# Patient Record
Sex: FEMALE | Race: WHITE | Employment: PART TIME | URBAN - METROPOLITAN AREA
[De-identification: names, ages, dates, MRNs, and addresses within clinical notes are randomized per-mention and may not be internally consistent; named-entity substitution may affect disease eponyms.]

---

## 2021-08-09 ENCOUNTER — HOSPITAL ENCOUNTER (INPATIENT)
Age: 21
LOS: 4 days | Discharge: HOME | DRG: 754 | End: 2021-08-13
Attending: PSYCHIATRY & NEUROLOGY | Admitting: INTERNAL MEDICINE
Payer: COMMERCIAL

## 2021-08-09 ENCOUNTER — APPOINTMENT (OUTPATIENT)
Dept: GENERAL RADIOLOGY | Age: 21
DRG: 817 | End: 2021-08-09
Payer: COMMERCIAL

## 2021-08-09 ENCOUNTER — HOSPITAL ENCOUNTER (INPATIENT)
Age: 21
LOS: 1 days | Discharge: PSYCHIATRIC OR SUBSTANCE USE FACILITY | DRG: 817 | End: 2021-08-09
Attending: EMERGENCY MEDICINE | Admitting: INTERNAL MEDICINE
Payer: COMMERCIAL

## 2021-08-09 VITALS
HEIGHT: 66 IN | BODY MASS INDEX: 42.98 KG/M2 | TEMPERATURE: 97.5 F | DIASTOLIC BLOOD PRESSURE: 88 MMHG | OXYGEN SATURATION: 99 % | RESPIRATION RATE: 20 BRPM | HEART RATE: 114 BPM | WEIGHT: 267.42 LBS | SYSTOLIC BLOOD PRESSURE: 123 MMHG

## 2021-08-09 DIAGNOSIS — G25.79 SEROTONIN SYNDROME: ICD-10-CM

## 2021-08-09 DIAGNOSIS — T50.902A INTENTIONAL DRUG OVERDOSE, INITIAL ENCOUNTER (HCC): Primary | ICD-10-CM

## 2021-08-09 PROBLEM — G90.81 SEROTONIN SYNDROME: Status: ACTIVE | Noted: 2021-08-09

## 2021-08-09 LAB
ACETAMINOPHEN LEVEL: <5 UG/ML (ref 10–30)
ALBUMIN SERPL-MCNC: 4 G/DL (ref 3.4–5)
ALP BLD-CCNC: 57 U/L (ref 40–129)
ALT SERPL-CCNC: 22 U/L (ref 10–40)
AMPHETAMINE SCREEN, URINE: NORMAL
ANION GAP SERPL CALCULATED.3IONS-SCNC: 14 MMOL/L (ref 3–16)
AST SERPL-CCNC: 17 U/L (ref 15–37)
BACTERIA: ABNORMAL /HPF
BARBITURATE SCREEN URINE: NORMAL
BASOPHILS ABSOLUTE: 0.1 K/UL (ref 0–0.2)
BASOPHILS RELATIVE PERCENT: 0.6 %
BENZODIAZEPINE SCREEN, URINE: NORMAL
BILIRUB SERPL-MCNC: 0.4 MG/DL (ref 0–1)
BILIRUBIN DIRECT: <0.2 MG/DL (ref 0–0.3)
BILIRUBIN URINE: NEGATIVE
BILIRUBIN, INDIRECT: NORMAL MG/DL (ref 0–1)
BLOOD, URINE: NEGATIVE
BUN BLDV-MCNC: 17 MG/DL (ref 7–20)
CALCIUM SERPL-MCNC: 9.8 MG/DL (ref 8.3–10.6)
CANNABINOID SCREEN URINE: NORMAL
CHLORIDE BLD-SCNC: 101 MMOL/L (ref 99–110)
CLARITY: ABNORMAL
CO2: 23 MMOL/L (ref 21–32)
COCAINE METABOLITE SCREEN URINE: NORMAL
COLOR: YELLOW
CREAT SERPL-MCNC: 0.8 MG/DL (ref 0.6–1.1)
EKG ATRIAL RATE: 115 BPM
EKG DIAGNOSIS: NORMAL
EKG P AXIS: 41 DEGREES
EKG P-R INTERVAL: 126 MS
EKG Q-T INTERVAL: 338 MS
EKG QRS DURATION: 100 MS
EKG QTC CALCULATION (BAZETT): 467 MS
EKG R AXIS: 2 DEGREES
EKG T AXIS: -3 DEGREES
EKG VENTRICULAR RATE: 115 BPM
EOSINOPHILS ABSOLUTE: 0.2 K/UL (ref 0–0.6)
EOSINOPHILS RELATIVE PERCENT: 1.4 %
EPITHELIAL CELLS, UA: 5 /HPF (ref 0–5)
ESTIMATED AVERAGE GLUCOSE: 99.7 MG/DL
ETHANOL: NORMAL MG/DL (ref 0–0.08)
GFR AFRICAN AMERICAN: >60
GFR NON-AFRICAN AMERICAN: >60
GLUCOSE BLD-MCNC: 104 MG/DL (ref 70–99)
GLUCOSE URINE: NEGATIVE MG/DL
HBA1C MFR BLD: 5.1 %
HCG QUALITATIVE: NEGATIVE
HCT VFR BLD CALC: 38.8 % (ref 36–48)
HEMOGLOBIN: 13 G/DL (ref 12–16)
HYALINE CASTS: 2 /LPF (ref 0–8)
KETONES, URINE: NEGATIVE MG/DL
LACTIC ACID: 1.4 MMOL/L (ref 0.4–2)
LEUKOCYTE ESTERASE, URINE: ABNORMAL
LYMPHOCYTES ABSOLUTE: 2 K/UL (ref 1–5.1)
LYMPHOCYTES RELATIVE PERCENT: 17.6 %
Lab: NORMAL
MAGNESIUM: 2 MG/DL (ref 1.8–2.4)
MCH RBC QN AUTO: 29.1 PG (ref 26–34)
MCHC RBC AUTO-ENTMCNC: 33.4 G/DL (ref 31–36)
MCV RBC AUTO: 87.1 FL (ref 80–100)
METHADONE SCREEN, URINE: NORMAL
MICROSCOPIC EXAMINATION: YES
MONOCYTES ABSOLUTE: 0.6 K/UL (ref 0–1.3)
MONOCYTES RELATIVE PERCENT: 5.2 %
NEUTROPHILS ABSOLUTE: 8.6 K/UL (ref 1.7–7.7)
NEUTROPHILS RELATIVE PERCENT: 75.2 %
NITRITE, URINE: NEGATIVE
OPIATE SCREEN URINE: NORMAL
OXYCODONE URINE: NORMAL
PDW BLD-RTO: 14.3 % (ref 12.4–15.4)
PH UA: 6.5
PH UA: 6.5 (ref 5–8)
PHENCYCLIDINE SCREEN URINE: NORMAL
PLATELET # BLD: 410 K/UL (ref 135–450)
PMV BLD AUTO: 8.3 FL (ref 5–10.5)
POTASSIUM REFLEX MAGNESIUM: 3.7 MMOL/L (ref 3.5–5.1)
PROPOXYPHENE SCREEN: NORMAL
PROTEIN UA: NEGATIVE MG/DL
RBC # BLD: 4.46 M/UL (ref 4–5.2)
RBC UA: 2 /HPF (ref 0–4)
SALICYLATE, SERUM: <0.3 MG/DL (ref 15–30)
SARS-COV-2, NAAT: NOT DETECTED
SODIUM BLD-SCNC: 138 MMOL/L (ref 136–145)
SPECIFIC GRAVITY UA: 1.02 (ref 1–1.03)
TOTAL CK: 42 U/L (ref 26–192)
TOTAL PROTEIN: 8.1 G/DL (ref 6.4–8.2)
TSH REFLEX: 2.26 UIU/ML (ref 0.27–4.2)
TSH REFLEX: 2.79 UIU/ML (ref 0.27–4.2)
URINE TYPE: ABNORMAL
UROBILINOGEN, URINE: 0.2 E.U./DL
WBC # BLD: 11.4 K/UL (ref 4–11)
WBC UA: 9 /HPF (ref 0–5)

## 2021-08-09 PROCEDURE — 71045 X-RAY EXAM CHEST 1 VIEW: CPT

## 2021-08-09 PROCEDURE — 83605 ASSAY OF LACTIC ACID: CPT

## 2021-08-09 PROCEDURE — 81001 URINALYSIS AUTO W/SCOPE: CPT

## 2021-08-09 PROCEDURE — 85025 COMPLETE CBC W/AUTO DIFF WBC: CPT

## 2021-08-09 PROCEDURE — 87635 SARS-COV-2 COVID-19 AMP PRB: CPT

## 2021-08-09 PROCEDURE — 2580000003 HC RX 258: Performed by: EMERGENCY MEDICINE

## 2021-08-09 PROCEDURE — 93005 ELECTROCARDIOGRAM TRACING: CPT | Performed by: PHYSICIAN ASSISTANT

## 2021-08-09 PROCEDURE — 80048 BASIC METABOLIC PNL TOTAL CA: CPT

## 2021-08-09 PROCEDURE — 80307 DRUG TEST PRSMV CHEM ANLYZR: CPT

## 2021-08-09 PROCEDURE — 84703 CHORIONIC GONADOTROPIN ASSAY: CPT

## 2021-08-09 PROCEDURE — 2500000003 HC RX 250 WO HCPCS: Performed by: INTERNAL MEDICINE

## 2021-08-09 PROCEDURE — 96361 HYDRATE IV INFUSION ADD-ON: CPT

## 2021-08-09 PROCEDURE — 93010 ELECTROCARDIOGRAM REPORT: CPT | Performed by: INTERNAL MEDICINE

## 2021-08-09 PROCEDURE — 6370000000 HC RX 637 (ALT 250 FOR IP): Performed by: INTERNAL MEDICINE

## 2021-08-09 PROCEDURE — 2000000000 HC ICU R&B

## 2021-08-09 PROCEDURE — 84443 ASSAY THYROID STIM HORMONE: CPT

## 2021-08-09 PROCEDURE — 80076 HEPATIC FUNCTION PANEL: CPT

## 2021-08-09 PROCEDURE — 83036 HEMOGLOBIN GLYCOSYLATED A1C: CPT

## 2021-08-09 PROCEDURE — 1240000000 HC EMOTIONAL WELLNESS R&B

## 2021-08-09 PROCEDURE — 83735 ASSAY OF MAGNESIUM: CPT

## 2021-08-09 PROCEDURE — 99285 EMERGENCY DEPT VISIT HI MDM: CPT

## 2021-08-09 PROCEDURE — 82077 ASSAY SPEC XCP UR&BREATH IA: CPT

## 2021-08-09 PROCEDURE — 80143 DRUG ASSAY ACETAMINOPHEN: CPT

## 2021-08-09 PROCEDURE — 99253 IP/OBS CNSLTJ NEW/EST LOW 45: CPT | Performed by: PSYCHIATRY & NEUROLOGY

## 2021-08-09 PROCEDURE — 82550 ASSAY OF CK (CPK): CPT

## 2021-08-09 PROCEDURE — 36415 COLL VENOUS BLD VENIPUNCTURE: CPT

## 2021-08-09 PROCEDURE — 80179 DRUG ASSAY SALICYLATE: CPT

## 2021-08-09 PROCEDURE — 6360000002 HC RX W HCPCS: Performed by: EMERGENCY MEDICINE

## 2021-08-09 PROCEDURE — 96374 THER/PROPH/DIAG INJ IV PUSH: CPT

## 2021-08-09 RX ORDER — ACETAMINOPHEN 650 MG/1
650 SUPPOSITORY RECTAL EVERY 6 HOURS PRN
Status: DISCONTINUED | OUTPATIENT
Start: 2021-08-09 | End: 2021-08-09 | Stop reason: HOSPADM

## 2021-08-09 RX ORDER — PROMETHAZINE HYDROCHLORIDE 25 MG/1
12.5 TABLET ORAL EVERY 6 HOURS PRN
Status: DISCONTINUED | OUTPATIENT
Start: 2021-08-09 | End: 2021-08-09 | Stop reason: HOSPADM

## 2021-08-09 RX ORDER — LORAZEPAM 2 MG/ML
1 INJECTION INTRAMUSCULAR ONCE
Status: COMPLETED | OUTPATIENT
Start: 2021-08-09 | End: 2021-08-09

## 2021-08-09 RX ORDER — LORAZEPAM 2 MG/ML
1 INJECTION INTRAMUSCULAR
Status: DISCONTINUED | OUTPATIENT
Start: 2021-08-09 | End: 2021-08-09 | Stop reason: HOSPADM

## 2021-08-09 RX ORDER — CYCLOBENZAPRINE HCL 5 MG
TABLET ORAL
Status: ON HOLD | COMMUNITY
Start: 2021-07-26 | End: 2021-08-09

## 2021-08-09 RX ORDER — SODIUM CHLORIDE 9 MG/ML
25 INJECTION, SOLUTION INTRAVENOUS PRN
Status: DISCONTINUED | OUTPATIENT
Start: 2021-08-09 | End: 2021-08-09 | Stop reason: HOSPADM

## 2021-08-09 RX ORDER — DIAZEPAM 5 MG/1
10 TABLET ORAL ONCE
Status: COMPLETED | OUTPATIENT
Start: 2021-08-09 | End: 2021-08-09

## 2021-08-09 RX ORDER — 0.9 % SODIUM CHLORIDE 0.9 %
1000 INTRAVENOUS SOLUTION INTRAVENOUS ONCE
Status: COMPLETED | OUTPATIENT
Start: 2021-08-09 | End: 2021-08-09

## 2021-08-09 RX ORDER — POTASSIUM CHLORIDE 7.45 MG/ML
10 INJECTION INTRAVENOUS PRN
Status: DISCONTINUED | OUTPATIENT
Start: 2021-08-09 | End: 2021-08-09 | Stop reason: HOSPADM

## 2021-08-09 RX ORDER — LABETALOL HYDROCHLORIDE 5 MG/ML
10 INJECTION, SOLUTION INTRAVENOUS EVERY 4 HOURS PRN
Status: DISCONTINUED | OUTPATIENT
Start: 2021-08-09 | End: 2021-08-09 | Stop reason: HOSPADM

## 2021-08-09 RX ORDER — ACETAMINOPHEN 325 MG/1
650 TABLET ORAL EVERY 6 HOURS PRN
Status: DISCONTINUED | OUTPATIENT
Start: 2021-08-09 | End: 2021-08-09 | Stop reason: HOSPADM

## 2021-08-09 RX ORDER — SODIUM CHLORIDE 0.9 % (FLUSH) 0.9 %
10 SYRINGE (ML) INJECTION EVERY 12 HOURS SCHEDULED
Status: DISCONTINUED | OUTPATIENT
Start: 2021-08-09 | End: 2021-08-09 | Stop reason: HOSPADM

## 2021-08-09 RX ORDER — ONDANSETRON 2 MG/ML
4 INJECTION INTRAMUSCULAR; INTRAVENOUS EVERY 6 HOURS PRN
Status: DISCONTINUED | OUTPATIENT
Start: 2021-08-09 | End: 2021-08-09 | Stop reason: HOSPADM

## 2021-08-09 RX ORDER — SODIUM CHLORIDE 0.9 % (FLUSH) 0.9 %
10 SYRINGE (ML) INJECTION PRN
Status: DISCONTINUED | OUTPATIENT
Start: 2021-08-09 | End: 2021-08-09 | Stop reason: HOSPADM

## 2021-08-09 RX ORDER — EPINEPHRINE 0.1 MG/.1ML
INJECTION, SOLUTION INTRAMUSCULAR
Status: ON HOLD | COMMUNITY
End: 2021-08-09

## 2021-08-09 RX ORDER — MAGNESIUM SULFATE IN WATER 40 MG/ML
2000 INJECTION, SOLUTION INTRAVENOUS PRN
Status: DISCONTINUED | OUTPATIENT
Start: 2021-08-09 | End: 2021-08-09 | Stop reason: HOSPADM

## 2021-08-09 RX ORDER — NAPROXEN 500 MG/1
TABLET ORAL
Status: ON HOLD | COMMUNITY
Start: 2021-07-26 | End: 2021-08-09

## 2021-08-09 RX ORDER — IBUPROFEN 200 MG
400 TABLET ORAL EVERY 6 HOURS PRN
Status: ON HOLD | COMMUNITY
End: 2021-08-09

## 2021-08-09 RX ORDER — CONTAINER,EMPTY
1 EACH MISCELLANEOUS
Status: DISCONTINUED | OUTPATIENT
Start: 2021-08-09 | End: 2021-08-09 | Stop reason: HOSPADM

## 2021-08-09 RX ORDER — CYCLOBENZAPRINE HCL 5 MG
TABLET ORAL
Status: ON HOLD | COMMUNITY
Start: 2021-07-26 | End: 2021-08-09 | Stop reason: HOSPADM

## 2021-08-09 RX ORDER — LABETALOL HYDROCHLORIDE 5 MG/ML
10 INJECTION, SOLUTION INTRAVENOUS ONCE
Status: COMPLETED | OUTPATIENT
Start: 2021-08-09 | End: 2021-08-09

## 2021-08-09 RX ADMIN — LABETALOL HYDROCHLORIDE 10 MG: 5 INJECTION INTRAVENOUS at 03:40

## 2021-08-09 RX ADMIN — LORAZEPAM 1 MG: 2 INJECTION INTRAMUSCULAR; INTRAVENOUS at 01:27

## 2021-08-09 RX ADMIN — SODIUM CHLORIDE 1000 ML: 9 INJECTION, SOLUTION INTRAVENOUS at 01:29

## 2021-08-09 RX ADMIN — DIAZEPAM 10 MG: 5 TABLET ORAL at 03:32

## 2021-08-09 ASSESSMENT — ENCOUNTER SYMPTOMS
SHORTNESS OF BREATH: 0
VOMITING: 0
NAUSEA: 0
ABDOMINAL PAIN: 0

## 2021-08-09 ASSESSMENT — PAIN DESCRIPTION - LOCATION
LOCATION: ABDOMEN
LOCATION: HEAD

## 2021-08-09 ASSESSMENT — PATIENT HEALTH QUESTIONNAIRE - PHQ9: SUM OF ALL RESPONSES TO PHQ QUESTIONS 1-9: 14

## 2021-08-09 ASSESSMENT — PAIN SCALES - GENERAL
PAINLEVEL_OUTOF10: 0
PAINLEVEL_OUTOF10: 4
PAINLEVEL_OUTOF10: 0

## 2021-08-09 ASSESSMENT — LIFESTYLE VARIABLES: HISTORY_ALCOHOL_USE: NO

## 2021-08-09 ASSESSMENT — SLEEP AND FATIGUE QUESTIONNAIRES
AVERAGE NUMBER OF SLEEP HOURS: 14
DO YOU USE A SLEEP AID: NO
DO YOU HAVE DIFFICULTY SLEEPING: NO

## 2021-08-09 ASSESSMENT — PAIN DESCRIPTION - PAIN TYPE: TYPE: ACUTE PAIN

## 2021-08-09 ASSESSMENT — PAIN DESCRIPTION - DESCRIPTORS: DESCRIPTORS: ACHING

## 2021-08-09 NOTE — DISCHARGE SUMMARY
complete review of systems was asked and negative     Discharge Exam:    /79   Pulse 80   Temp 96.7 °F (35.9 °C) (Temporal)   Resp 18   Ht 5' 6\" (1.676 m)   Wt 267 lb 6.7 oz (121.3 kg)   SpO2 94%   BMI 43.16 kg/m²   General appearance:  NAD  HEENT:   Normal cephalic, atraumatic, moist mucous membranes, no oropharyngeal erythema or exudate  Heart[de-identified] Normal s1/s2, RRR, no murmurs, gallops, or rubs. no leg edema  Lungs:  Normal respiratory effort. Clear to auscultation, bilaterally without Rales/Wheezes/Rhonchi. Abdomen: Soft, non-tender, non-distended, bowel sounds present, no masses  Musculoskeletal:  No clubbing, no cyanosis, *  Neurologic:  Neurovascularly intact without any focal sensory/motor deficits. Cranial nerves: II-XII intact, grossly non-focal.    Labs: For convenience and continuity at follow-up the following most recent labs are provided:    Lab Results   Component Value Date    WBC 11.4 08/09/2021    HGB 13.0 08/09/2021    HCT 38.8 08/09/2021    MCV 87.1 08/09/2021     08/09/2021     08/09/2021    K 3.7 08/09/2021     08/09/2021    CO2 23 08/09/2021    BUN 17 08/09/2021    CREATININE 0.8 08/09/2021    CALCIUM 9.8 08/09/2021    ALKPHOS 57 08/09/2021    ALT 22 08/09/2021    AST 17 08/09/2021    BILITOT 0.4 08/09/2021    BILIDIR <0.2 08/09/2021    LABALBU 4.0 08/09/2021     No results found for: INR        The patient was seen and examined on day of discharge and this discharge summary is in conjunction with any daily progress note from day of discharge. Time Spent on discharge is 45 minutes  in the examination, evaluation, counseling and review of medications and discharge plan. Note that greater  than 30 minutes was spent in preparing discharge papers, discussing discharge with patient, medication review, etc.       Signed:    Sariah Whipple MD   8/9/2021      Thank you Laurita Espinosa for the opportunity to be involved in this patient's care.  If you have any questions or concerns please feel free to contact me

## 2021-08-09 NOTE — PLAN OF CARE
Problem: Suicide risk  Goal: Provide patient with safe environment  Description: Provide patient with safe environment  8/9/2021 1221 by Rand Poe RN  Outcome: Ongoing  8/9/2021 0541 by Mary Jones RN  Outcome: Met This Shift     Problem: SAFETY  Goal: Free from accidental physical injury  Outcome: Ongoing  Goal: Free from intentional harm  Outcome: Ongoing     Problem: DAILY CARE  Goal: Daily care needs are met  Outcome: Ongoing     Problem: PAIN  Goal: Patient's pain/discomfort is manageable  Outcome: Ongoing     Problem: SKIN INTEGRITY  Goal: Skin integrity is maintained or improved  Outcome: Ongoing     Problem: KNOWLEDGE DEFICIT  Goal: Patient/S.O. demonstrates understanding of disease process, treatment plan, medications, and discharge instructions.   Outcome: Ongoing     Problem: DISCHARGE BARRIERS  Goal: Patient's continuum of care needs are met  8/9/2021 1221 by Rand Poe RN  Outcome: Ongoing  8/9/2021 0541 by Mary Jones RN  Outcome: Ongoing     Problem: Pain:  Goal: Pain level will decrease  Description: Pain level will decrease  Outcome: Ongoing  Goal: Control of acute pain  Description: Control of acute pain  Outcome: Ongoing  Goal: Control of chronic pain  Description: Control of chronic pain  Outcome: Ongoing

## 2021-08-09 NOTE — PROGRESS NOTES
Yocasta De La Fuente called to verify that the Pt has a statement of Belief 72/hr hold filled out and on the pts hard chart. Hard chart checked and verified statement is present.

## 2021-08-09 NOTE — ED PROVIDER NOTES
[facility]        Pt Name: Crystal Gordon  MRN: [id number]  Estephaniagfmaritza 2000  Date of evaluation: 8/9/2021  Provider: Aggie Mccray PA-C  PCP: Thao Wyatt  Note Started: 12:52 AM EDT        I have seen and evaluated this patient with my supervising physician Jd Cee MD.    [address] [place] [address]       Chief Complaint   Patient presents with    Drug Overdose    Suicidal       HISTORY OF PRESENT ILLNESS   (Location, Timing/Onset, Context/Setting, Quality, Duration, Modifying Factors, Severity, Associated Signs and Symptoms)  Note limiting factors. Chief Complaint: Dietra Kawasaki is a 23 y.o. female who presents to the emergency department for evaluation of suicide attempt. Patient knowingly ingested fifteen 50 mg sertraline tablets in an attempt to end her life. Patient states she just felt sad today and nothing in particular set her off although she was crying in the bathroom at work, her coworkers found her and she ran outside and ingested the pills out by the [employer]. Patient has a previous suicidal attempt with overdose of ibuprofen. Denies any other over-the-counter drug use. Denies any other prescription drug use. Denies any alcohol use. Nursing Notes were all reviewed and agreed with or any disagreements were addressed in the HPI. REVIEW OF SYSTEMS    (2-9 systems for level 4, 10 or more for level 5)     Review of Systems   Constitutional: Negative for fatigue and fever. HENT: Negative. Eyes: Negative for visual disturbance. Respiratory: Negative for shortness of breath. Cardiovascular: Negative for chest pain. Gastrointestinal: Negative for abdominal pain, nausea and vomiting. Genitourinary: Negative. Musculoskeletal: Negative. Skin: Negative. Neurological: Negative. Psychiatric/Behavioral: Positive for suicidal ideas.        Positives and Pertinent negatives as per HPI. Except as noted above in the ROS, all other systems were reviewed and negative. PAST MEDICAL HISTORY     Past Medical History:   Diagnosis Date    Depression          SURGICAL HISTORY   History reviewed. No pertinent surgical history. CURRENTMEDICATIONS       Previous Medications    CYCLOBENZAPRINE (FLEXERIL) 5 MG TABLET    Take by mouth    CYCLOBENZAPRINE (FLEXERIL) 5 MG TABLET    TAKE 1 TABLET BY MOUTH THREE TIMES A DAY    EPINEPHRINE (AUVI-Q) 0.1 MG/0.1ML SOAJ        IBUPROFEN (ADVIL;MOTRIN) 200 MG TABLET    Take 200 mg by mouth every 6 hours as needed. IBUPROFEN (ADVIL;MOTRIN) 200 MG TABLET    Take 400 mg by mouth every 6 hours as needed    IBUPROFEN (IBU) 600 MG TABLET    Take 1 tablet by mouth every 8 hours as needed for Pain. NAPROXEN (NAPROSYN) 500 MG TABLET    TAKE 1 TABLET BY MOUTH TWICE A DAY WITH FOOD    SERTRALINE (ZOLOFT) 50 MG TABLET    TAKE 1 TABLET BY MOUTH EVERY DAY    SERTRALINE (ZOLOFT) 50 MG TABLET    Take by mouth         ALLERGIES     Nutritional supplements    FAMILYHISTORY     History reviewed. No pertinent family history. SOCIAL HISTORY       Social History     Tobacco Use    Smoking status: Never Smoker    Smokeless tobacco: Never Used   Substance Use Topics    Alcohol use: No    Drug use: No       SCREENINGS             PHYSICAL EXAM    (up to 7 for level 4, 8 or more for level 5)     ED Triage Vitals   BP Temp Temp src Pulse Resp SpO2 Height Weight   -- -- -- -- -- -- -- --       Physical Exam  Vitals and nursing note reviewed. Constitutional:       General: She is not in acute distress. Appearance: Normal appearance. She is well-developed. She is not toxic-appearing or diaphoretic. HENT:      Head: Normocephalic and atraumatic. Nose: Nose normal.   Eyes:      General:         Right eye: No discharge. Left eye: No discharge. Cardiovascular:      Rate and Rhythm: Normal rate and regular rhythm. Heart sounds: Normal heart sounds. No murmur heard. No gallop. Pulmonary:      Effort: Pulmonary effort is normal. No respiratory distress. Breath sounds: Normal breath sounds. No wheezing or rales. Musculoskeletal:         General: Normal range of motion. Cervical back: Normal range of motion and neck supple. Skin:     General: Skin is warm and dry. Coloration: Skin is not pale. Neurological:      Mental Status: She is alert and oriented to person, place, and time. Psychiatric:         Attention and Perception: Attention normal.         Mood and Affect: Affect is flat. Speech: Speech is not slurred. Behavior: Behavior is slowed. Thought Content: Thought content includes suicidal ideation. Thought content does not include homicidal ideation. Thought content does not include homicidal plan.          DIAGNOSTIC RESULTS   LABS:    Labs Reviewed   CBC WITH AUTO DIFFERENTIAL - Abnormal; Notable for the following components:       Result Value    WBC 11.4 (*)     Neutrophils Absolute 8.6 (*)     All other components within normal limits    Narrative:     Performed at:  [facility]  [address], [place]   Phone [phone]   BASIC METABOLIC PANEL W/ REFLEX TO MG FOR LOW K - Abnormal; Notable for the following components:    Glucose 104 (*)     All other components within normal limits    Narrative:     Performed at:  [facility]  [address], [address]   Phone [phone]   SALICYLATE LEVEL - Abnormal; Notable for the following components:    Salicylate, Serum <9.8 (*)     All other components within normal limits    Narrative:     Performed at:  [facility]  [address], [address]   Phone [phone]   ACETAMINOPHEN LEVEL - Abnormal; Notable for the following components:    Acetaminophen Level <5 (*)     All other components within normal limits Narrative:     Performed at:  [facility] [address],  [place], [address]   Phone [phone], RAP[place]    Narrative:     Performed at:  [facility] [address],  [place], [address]   Phone [phone]   HCG, SERUM, QUALITATIVE    Narrative:     Performed at:  [facility] [address],  [place], [address]   Phone [phone]   ETHANOL    Narrative:     Performed at:  [facility] [address],  [place], [address]   Phone [phone]   CK    Narrative:     Performed at:  [facility] [address],  [place], [address]   Phone [phone]   LACTIC ACID, PLASMA    Narrative:     Performed at:  [facility] [address],  [place], [address]   Phone [phone]   HEPATIC FUNCTION PANEL    Narrative:     Performed at:  [facility] [address]  [place], [address]   Phone [phone]   MAGNESIUM    Narrative:     Performed at:  [facility] [address],  [place], [address]   Phone [phone]   URINE DRUG SCREEN   URINALYSIS   TSH WITH REFLEX       When ordered only abnormal lab results are displayed. All other labs were within normal range or not returned as of this dictation. EKG: When ordered, EKG's are interpreted by the Emergency Department Physician in the absence of a cardiologist.  Please see their note for interpretation of EKG.     RADIOLOGY:   Non-plain film images such as CT, Ultrasound and MRI are read by the radiologist. Plain radiographic images are visualized and preliminarily interpreted by the ED Provider with the below findings:        Interpretation per the Radiologist below, if available at the time of this note:    XR CHEST PORTABLE   Final Result   No significant findings in the chest.           No results found. PROCEDURES   Unless otherwise noted below, none     Procedures    CRITICAL CARE TIME   N/A    CONSULTS:  IP CONSULT TO HOSPITALIST  IP CONSULT TO PSYCHIATRY      EMERGENCY DEPARTMENT COURSE and DIFFERENTIAL DIAGNOSIS/MDM:   Vitals:    Vitals:    08/09/21 0300 08/09/21 0330 08/09/21 0333 08/09/21 0345   BP: (!) 133/90 (!) 135/91  134/69   Pulse: 115 113  96   Resp: 23 28  28   Temp:   99 °F (37.2 °C)    TempSrc:   Oral    SpO2: 94% 97%  95%   Weight:       Height:           Patient was given the following medications:  Medications   LORazepam (ATIVAN) injection 1 mg (has no administration in time range)   LORazepam (ATIVAN) injection 1 mg (1 mg Intravenous Given 8/9/21 0127)   0.9 % sodium chloride bolus (0 mLs Intravenous Stopped 8/9/21 0251)   diazePAM (VALIUM) tablet 10 mg (10 mg Oral Given 8/9/21 0332)   labetalol (NORMODYNE;TRANDATE) injection 10 mg (10 mg Intravenous Given 8/9/21 0340)           Patient presents emergency department for evaluation of intentional overdose. Patient intentionally took 15 tablets of 50 mg Zoloft in an attempt to end her life. Patient states she just \"felt very sad today\". When asked why she felt sad today she states \"nothing really\". Patient states she was crying at work when her coworkers found her and so she went outside by [employer] and took all of the pills. Patient is slow with her speech and slow to respond. Patient holds eye contact and inappropriate length of time. Her affect is rather flat and odd. Patient has bilateral hand/foot tremors. Patient does start vomiting here in the emergency department. Patient has a history of intentional overdose with ibuprofen but states she did not take any of this today. Patient denies any additional history of self-harm such as cutting. No fresh wounds noted on examination. Heart rate is tachycardic without murmurs rubs or gallops. Lungs clear to auscultation bilaterally.  Patient's heart rate vacillates between 100 bpm and 130 bpm. Pupils are appropriate for light in the room. Equal and reactive to light. She is able to ambulate with normal gait. Normal strength and coordination to all 4 extremities. She is cooperative on examination. She is started on fluids as well as Ativan. EKG shows sinus tachycardia without acute ST changes. I spoke with Guzman Gresham from poison control who states that SSRI overdose can cause CNS depression and both sedation or agitation. Benzo should be given for agitation. She states at this point charcoal can be administered however not necessary, Zoloft therapeutic peak is 48 hours after ingestion. She recommends a observation of 6 to 12 hours. Patient remains very tremulous with odd affect. Concern for serotonin syndrome. Patient will be admitted to the hospitalist for further management. Dr. Tanesha Delacruz accepts the patient for admission prior to psychiatric transfer. FINAL IMPRESSION      1. Intentional drug overdose, initial encounter ([facility] [address]    2. Serotonin syndrome          DISPOSITION/PLAN   DISPOSITION Admitted 08/09/2021 03:23:54 AM      PATIENT REFERRED TO:  No follow-up provider specified.     DISCHARGE MEDICATIONS:  New Prescriptions    No medications on file       DISCONTINUED MEDICATIONS:  Discontinued Medications    No medications on file              (Please note that portions of this note were completed with a voice recognition program.  Efforts were made to edit the dictations but occasionally words are mis-transcribed.)    Aguilar North PA-C (electronically signed)           Aguilar North PA-C  08/09/21 0000

## 2021-08-09 NOTE — PROGRESS NOTES
Pt arrived from the ED with a . She is on room air breathing equal and easy. VSS. At this time the pt denies any Pain, Shortness of breath, N&V. No belongings came up with the Pt. Per ED notes all belongings were sent to security. Pt states that she is not yet able to void for a urine collect but will attempt again. She further states that she works at [employer], she lives at home with her great grandma and their cat. Pt would like her grandma notified of the admission later this morning but declined to have her called at this early Hour. Pt said that her mother lives in [place] but she does not know of her working number \" She has her phone stolen a lot so I don't know. \" Pt is vocal, calm and cooperative, she states that \"I wish I had a , someone to help me make appointments and get to them. \" Pt said that she does not drive, \"Im too scared to drive. \" She further says that she has a history of \"Hallucinations\" none presently, but the last time was \"last month I was seeing centipedes. \" Lastly she stated that she has not been diagnosed with a mental health disorder but she is receptive to help. Pt room made safe, Lockable cabinets locked, extra wires removed, Pt has a safety sitter. Bed locked, lowed, rails x 2, alarm on. Pt updated on plan and agreeable. No needs made known.    Allison Morris RN

## 2021-08-09 NOTE — ED PROVIDER NOTES
I independently performed a history and physical on [facility]. All diagnostic, treatment, and disposition decisions were made by myself in conjunction with the advanced practice provider. Briefly, this is a 23 y.o. female here for intentional overdose. Wish to commit suicide. Has been feeling more stressed recently. No clear inciting factor. 1 hour prior to arrival, took about 15 tablets of Zoloft 50 mg all at once. Denies any other ingestions. Denies alcohol intake or drugs. Has mild generalized abdominal discomfort. Has nausea. No chest pain or shortness of breath. No recent fever or illness. .    On exam,   General: Patient is in no acute distress  Skin: No cyanosis  HEENT: Moist mucous membranes  Heart: Regular rate, regular rhythm  Lung: No respiratory distress  Abdomen: Soft, nontender  Neuro: Moving all extremities, no facial droop, no slurred speech, answers questions appropriately. Inducible clonus in ankles. Resting tremor  Flat affect, suicidal        EKG  The Ekg interpreted by me in the absence of a cardiologist shows. Normal sinus rhythm  No acute ST changes     Qtc 467  No priors for comparison      Screenings            MDM  Briefly, this is a 23 y.o. female here for intentional overdose with Zoloft. Found to be tachycardic to 130 with tremor and clonus. May have serotonin syndrome however time course is inconsistent since peak effect usually is 4-6 hours after Zoloft ingestion. Will consult poison control. Will obtain EKG. Will avoid QT prolonging medications. Will give Ativan, IV fluids for tachycardia. Does not appear agitated. She is on a 72-hour hold for intentional overdose for suicidal ideation. Will admit to hospitalist service. Since patient came in over an hour after ingestion and she is actively vomiting, we will not give activated charcoal    Poison control consulted    On reevaluation does not have abd pain.      The total critical care

## 2021-08-09 NOTE — PROGRESS NOTES
Pt requested that her great grandmother whom she lives with be contacted. Desmond Santa [phone]. Attempted to contact without answer. Will attempt again.
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
## 2021-08-09 NOTE — DISCHARGE INSTR - COC
Continuity of Care Form    Patient Name: Stiven Perea   :  2000  MRN:  [id number]    Admit date:  2021  Discharge date:  ***    Code Status Order: Full Code   Advance Directives:      Admitting Physician:  Lilian Patten DO  PCP: Zena Collazo    Discharging Nurse: [facility] Unit/Room#: [id number]/8819-96  Discharging Unit Phone Number: ***    Emergency Contact:   Extended Emergency Contact Information  Primary Emergency Contact: Tiffany Curiel  Address: [address] Phone: [phone]  Relation: Parent    Past Surgical History:  History reviewed. No pertinent surgical history. Immunization History: There is no immunization history on file for this patient.     Active Problems:  Patient Active Problem List   Diagnosis Code    Serotonin syndrome G25.79    Intentional drug overdose ([facility] [address] T50.902A    Depression F32.9    Anxiety disorder F41.9       Isolation/Infection:   Isolation            No Isolation          Patient Infection Status       None to display            Nurse Assessment:  Last Vital Signs: /79   Pulse 80   Temp 96.7 °F (35.9 °C) (Temporal)   Resp 18   Ht 5' 6\" (1.676 m)   Wt 267 lb 6.7 oz (121.3 kg)   SpO2 94%   BMI 43.16 kg/m²     Last documented pain score (0-10 scale): Pain Level: 0  Last Weight:   Wt Readings from Last 1 Encounters:   21 267 lb 6.7 oz (121.3 kg)     Mental Status:  {IP PT MENTAL STATUS::::0}    IV Access:  { YUDITH IV ACCESS:908462225:::0}    Nursing Mobility/ADLs:  Walking   {CHP DME ADLs:937572246:::0}  Transfer  {CHP DME ADLs:426894049:::0}  Bathing  {CHP DME ADLs:122057759:::0}  Dressing  {CHP DME ADLs:224261717:::0}  Toileting  {CHP DME ADLs:728195326:::0}  Feeding  {CHP DME ADLs:667711344:::0}  Med Admin  {CHP DME ADLs:434300065:::0}  Med Delivery   { YUDITH MED Delivery:363454725:::0}    Wound Care Documentation and Therapy: Elimination:  Continence: Bowel: {YES / GT:91113}  Bladder: {YES / DS:07015}  Urinary Catheter: {Urinary Catheter:638570517:::0}   Colostomy/Ileostomy/Ileal Conduit: {YES / MW:67980}       Date of Last BM: ***    Intake/Output Summary (Last 24 hours) at 2021 1550  Last data filed at 2021 1200  Gross per 24 hour   Intake 480 ml   Output 700 ml   Net -220 ml     I/O last 3 completed shifts:   In: 18 [P.O.:480]  Out: 700 [Urine:700]    Safety Concerns:     { YUDITH Safety Concerns:300089420:::0}    Impairments/Disabilities:      [address] Impairments/Disabilities:534224959:::0}    Nutrition Therapy:  Current Nutrition Therapy:   [address] YUDITH Diet List:004078391:::0}    Routes of Feeding: {[facility] DME Other Feedings:208505755:::0}  Liquids: {Slp liquid thickness:51963}  Daily Fluid Restriction: {[facility] DME Yes amt example:288975898:::0}  Last Modified Barium Swallow with Video (Video Swallowing Test): {Done Not Done OPBU:132840300:::0}    Treatments at the Time of Hospital Discharge:   Respiratory Treatments: ***  Oxygen Therapy:  {Therapy; copd oxygen:65837:::0}  Ventilator:    { CC Vent List:486583628:::0}    Rehab Therapies: {THERAPEUTIC INTERVENTION:9822063912}  Weight Bearing Status/Restrictions: { CC Weight Bearin:::0}  Other Medical Equipment (for information only, NOT a DME order):  {EQUIPMENT:023466696}  Other Treatments: ***    Patient's personal belongings (please select all that are sent with patient):  {[facility] DME Belongings:550905276:::0}    RN SIGNATURE:  {Esignature:455215273:::0}    CASE MANAGEMENT/SOCIAL WORK SECTION    Inpatient Status Date: ***    Readmission Risk Assessment Score:  Readmission Risk              Risk of Unplanned Readmission:  7           Discharging to Facility/ Agency   Name:   Address:  Phone:  Fax:    Dialysis Facility (if applicable)   Name:  Address:  Dialysis Schedule:  Phone:  Fax:    / signature: {Esignature:811900730:::0}    PHYSICIAN SECTION    Prognosis: Good    Condition at Discharge: Stable    Rehab Potential (if transferring to Rehab): Good    Recommended Labs or Other Treatments After Discharge: none     Physician Certification: I certify the above information and transfer of Thea Cifuentes  is necessary for the continuing treatment of the diagnosis listed and that she requires Intermediate Nursing Care for greater 30 days.      Update Admission H&P: No change in H&P    PHYSICIAN SIGNATURE:  Electronically signed by Hetcor Dueñas MD on 8/9/21 at 3:51 PM EDT

## 2021-08-09 NOTE — PROGRESS NOTES
Norma Estrella from Poison control called for an update. Vital signs, and results for ETOH, acetaminophen and salicylate given. No further questions. Poison control available if needed and will continue to follow.

## 2021-08-09 NOTE — PROGRESS NOTES
Assessment complete. VSS. Medications given per MAR. Alert and oriented. NSR/ST per monitor. Lung sounds clear. PERRLA. Neuro assessment complete. See flowsheet for details. Sitter at bedside. Patient calm and cooperative. Call light and bedside table within reach.

## 2021-08-09 NOTE — H&P
Counseling Given     Comments             Family History:      Reviewed in detail, and noncontributory    History reviewed. No pertinent family history. REVIEW OF SYSTEMS:     Constitutional:  No Fever, No Chills, No Night Sweats  ENT/Mouth:  No Nasal Congestion,  No Hoarseness, No new mouth lesion  Eyes:  No Eye Pain, No Redness, No Discharge  Cardiovascular:  No Chest Pain, No Orthopnea, No Palpitations  Respiratory:  No Cough, No Sputum, No Dyspnea  Gastrointestinal: No Vomiting, No Diarrhea, No abdominal pain  Genitourinary: No Urinary Frequency, No Hematuria, No Urinary pain  Musculoskeletal:  No worsening Arthralgias, No worsening Myalgias  Skin:  No new Skin Lesions, No new skin rash  Neuro:  No new weakness, No new numbness. Psych:  + suicial ideation, No Violence ideation    PHYSICAL EXAM PERFORMED:    /78   Pulse 100   Temp 96.7 °F (35.9 °C) (Temporal)   Resp 28   Ht 5' 6\" (1.676 m)   Wt 267 lb 6.7 oz (121.3 kg)   SpO2 98%   BMI 43.16 kg/m²     General appearance:  mild acute distress, appears older than stated age  HEENT:   atraumatic, sclera anicteric, Conjunctivae clear. Neck: Supple,Trachea midline, no goiter  Respiratory:minimal accessory muscle usage, Normal respiratory effort. Clear to auscultation, bilaterally without wheezing  Cardiovascular: Tachycardic, capillary refill 2 seconds  Abdomen: Morbid obese, soft, non-tender, non-distended with normal bowel sounds. Musculoskeletal:  No clubbing, cyanosis. trace edema LE bilaterally. Skin: turgor normal.  No new rashes or lesions. Neurologic: Alert and oriented x4, no new focal sensory/motor deficits.   DTR 2/4, clonus present and spontaneous, tremulous, anxious    Labs:     Recent Labs     08/09/21  0125   WBC 11.4*   HGB 13.0   HCT 38.8        Recent Labs     08/09/21  0125      K 3.7      CO2 23   BUN 17   CREATININE 0.8   CALCIUM 9.8     Recent Labs     08/09/21  0258   AST 17   ALT 22   BILIDIR <0.2 BILITOT 0.4   ALKPHOS 57     No results for input(s): INR in the last 72 hours. Recent Labs     08/09/21  0258   CKTOTAL 42       Urinalysis:    No results found for: Denys Sánchez, BACTERIA, RBCUA, BLOODU, [address], GLUCOSEU    Radiology:     CXR: I have reviewed the CXR with the following interpretation:   No acute process  EKG:  I have reviewed the EKG with the following interpretation:   Sinus tachycardia     XR CHEST PORTABLE   Final Result   No significant findings in the chest.             ASSESSMENT AND PLAN:    Active Hospital Problems    Diagnosis Date Noted    Serotonin syndrome [G25.79] 08/09/2021     Serotonin syndrome:  Evident with clonus, tremors  Did not appreciate hyperreflexia or ocular involvement  IV labetalol given due to hypertension  Librium, with IV Ativan    Suicidal ideation with attempt  dedra Rudd slipped  Psychiatry consultation, much appreciated    Class III obesity:Complicating assessment and treatment. Placing patient at risk for multiple co-morbidities as well as early death and contributing to the patient's presentation.  Education, and counseling    Depression:  Held Zoloft in the setting of SS    Diet: Regular diet    DVT Prophylaxis: Held    Dispo:   Expected LOS greater than two Shanti 1153, DO

## 2021-08-10 PROCEDURE — 6370000000 HC RX 637 (ALT 250 FOR IP): Performed by: PSYCHIATRY & NEUROLOGY

## 2021-08-10 PROCEDURE — 99223 1ST HOSP IP/OBS HIGH 75: CPT | Performed by: PSYCHIATRY & NEUROLOGY

## 2021-08-10 PROCEDURE — 1240000000 HC EMOTIONAL WELLNESS R&B

## 2021-08-10 RX ORDER — ARIPIPRAZOLE 10 MG/1
5 TABLET ORAL DAILY
Status: DISCONTINUED | OUTPATIENT
Start: 2021-08-10 | End: 2021-08-11

## 2021-08-10 RX ORDER — ACETAMINOPHEN 325 MG/1
650 TABLET ORAL EVERY 4 HOURS PRN
Status: DISCONTINUED | OUTPATIENT
Start: 2021-08-10 | End: 2021-08-13 | Stop reason: HOSPADM

## 2021-08-10 RX ORDER — IBUPROFEN 400 MG/1
400 TABLET ORAL EVERY 6 HOURS PRN
Status: DISCONTINUED | OUTPATIENT
Start: 2021-08-10 | End: 2021-08-13 | Stop reason: HOSPADM

## 2021-08-10 RX ORDER — CYCLOBENZAPRINE HCL 5 MG
5 TABLET ORAL 3 TIMES DAILY
Status: ON HOLD | COMMUNITY
End: 2021-08-13 | Stop reason: HOSPADM

## 2021-08-10 RX ORDER — NAPROXEN 500 MG/1
500 TABLET ORAL 2 TIMES DAILY WITH MEALS
Status: ON HOLD | COMMUNITY
End: 2021-08-23 | Stop reason: HOSPADM

## 2021-08-10 RX ORDER — LAMOTRIGINE 25 MG/1
50 TABLET ORAL DAILY
Status: DISCONTINUED | OUTPATIENT
Start: 2021-08-10 | End: 2021-08-13 | Stop reason: HOSPADM

## 2021-08-10 RX ADMIN — LAMOTRIGINE 50 MG: 25 TABLET ORAL at 14:33

## 2021-08-10 RX ADMIN — SERTRALINE 50 MG: 50 TABLET, FILM COATED ORAL at 10:57

## 2021-08-10 RX ADMIN — ARIPIPRAZOLE 5 MG: 10 TABLET ORAL at 14:33

## 2021-08-10 ASSESSMENT — LIFESTYLE VARIABLES: HISTORY_ALCOHOL_USE: NO

## 2021-08-10 ASSESSMENT — PATIENT HEALTH QUESTIONNAIRE - PHQ9: SUM OF ALL RESPONSES TO PHQ QUESTIONS 1-9: 11

## 2021-08-10 ASSESSMENT — SLEEP AND FATIGUE QUESTIONNAIRES
DO YOU USE A SLEEP AID: NO
DO YOU HAVE DIFFICULTY SLEEPING: NO
AVERAGE NUMBER OF SLEEP HOURS: 13

## 2021-08-10 NOTE — PLAN OF CARE
Problem: Suicide risk  Goal: Provide patient with safe environment  Description: Provide patient with safe environment  Outcome: Ongoing     Problem: Altered Mood, Depressive Behavior:  Goal: Ability to disclose and discuss suicidal ideas will improve  Description: Ability to disclose and discuss suicidal ideas will improve  Outcome: Ongoing   Patient has mainly been withdrawn to her room most of shift coloring and drawing in there. She did come out for meals and groups. She is A&Ox4 and denies that she is having thoughts to want to harm self, but is having passive thoughts that she is okay with dying. She has no plan or intent. She did talk about previous attempts she has tried to self harm, but has been to scared or something never worked out right. She denies any thoughts to want to harm others. She claims to experiencing A/V hallucinations however, does not appear to be RTIS or Tb'ing. She claims that she hears background noise like being in a lunch room and claims to see bugs and insects. She claims that she has been experiencing the hallucinations for the past 10 years. She did make any delusional statements while interacting with staff. She complaining of feeling anxious being out on the unit. She has been no issue on the unit.

## 2021-08-10 NOTE — PROGRESS NOTES
Courtesy call to Receiving RN at [facility] [phone]  that transport has picked up the Pt and will be en route.    Bam Yadav RN

## 2021-08-10 NOTE — H&P
Patient is transferred from the inpatient unit at [facility] after an intentional sertraline overdose. She was cared for by the internal medicine service and eventually medically cleared for transfer to [facility]. A complete physical exam with cranial nerve exam was completed by Dr. Kwasi Bella on 8/9/21, please see this note. I reviewed the dc summary and patient's chart, I do not see any need for further medical intervention. Please call the IM team if any issues arise.     Isabelle Peña PA-C  8/10/2021 1:16 PM
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
## 2021-08-10 NOTE — BH NOTE
Patient denies SI currently. Patient denies any plan. Patient CFS on unit. Signed in Voluntary. Matthew Castro okay to discontinue suicide precautions on unit.

## 2021-08-10 NOTE — FLOWSHEET NOTE
Purposeful Rounding    Patient Location Patient room    Patient willing to engage in conversationYes    Presentation/behavior Anxious    Affect Flat/blunted    Concerns reported: none    PRN medications given:no    Environmental assessment Room free from clutter, Clear path to bathroom  and Adequate lighting    Fall prevention interventions in place: Yellow non-skid socks on    Daily [place] Fall Risk Score :65    Daily Choi Fall Risk Score : low
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
## 2021-08-10 NOTE — BH NOTE
[facility]  Treatment Team Note  Review Date & Time: 8/10/20 0916    Patient was not in treatment team      Status EXAM:   Status and Exam  Normal: No  Facial Expression: Flat  Affect: Blunt  Level of Consciousness: Alert  Mood:Normal: No  Mood: Depressed, Ambivalent  Motor Activity:Normal: Yes  Interview Behavior: Cooperative  Preception: [place] to Person, Justen Dear to Time, [place] to Place, [place] to Situation  Attention:Normal: No  Attention: Distractible  Thought Processes: Circumstantial  Thought Content:Normal: No  Thought Content: Preoccupations  Hallucinations: None  Delusions: No  Memory:Normal: No  Memory: Poor Recent  Insight and Judgment: No  Insight and Judgment: Poor Judgment, Poor Insight  Present Suicidal Ideation: No  Present Homicidal Ideation: No      Suicide Risk CSSR-S:  1) Within the past month, have you wished you were dead or wished you could go to sleep and not wake up? : Yes  2) Have you actually had any thoughts of killing yourself? : Yes  3) Have you been thinking about how you might kill yourself? : Yes  5) Have you started to work out or worked out the details of how to kill yourself?  Do you intend to carry out this plan? : No  6) Have you ever done anything, started to do anything, or prepared to do anything to end your life?: Yes      PLAN/TREATMENT RECOMMENDATIONS UPDATE: Monitor effectiveness of medications, encourage participation in therapies          Ruben Bermudez RN

## 2021-08-10 NOTE — PROGRESS NOTES
Pt's Jasmina Itz \"Ruby\" [phone] Called and notified of Pts transfer to [facility], she was provided with the Address and phone Numbers to the hospital.   Fabian Byrne RN

## 2021-08-10 NOTE — BH NOTE
[facility]  Admission Note     Admission Type:   Admission Type: Voluntary    Reason for admission:  Reason for Admission: OD on medications    PATIENT STRENGTHS:  Strengths: No significant Physical Illness    Patient Strengths and Limitations:  Limitations: Tendency to isolate self, General negative or hopeless attitude about future/recovery    Addictive Behavior:   Addictive Behavior  In the past 3 months, have you felt or has someone told you that you have a problem with:  : None  Do you have a history of Chemical Use?: No  Do you have a history of Alcohol Use?: No  Do you have a history of Street Drug Abuse?: No  Histroy of Prescripton Drug Abuse?: No    Medical Problems:   Past Medical History:   Diagnosis Date    Depression        Status EXAM:  Status and Exam  Normal: No  Facial Expression: Flat  Affect: Blunt  Level of Consciousness: Alert  Mood:Normal: No  Mood: Depressed, Ambivalent  Motor Activity:Normal: Yes  Interview Behavior: Cooperative  Preception: [place] to Person, [place] to Time, [place] to Place, [place] to Situation  Attention:Normal: No  Attention: Distractible  Thought Processes: Circumstantial  Thought Content:Normal: No  Thought Content: Preoccupations  Hallucinations: None  Delusions: No  Memory:Normal: No  Memory: Poor Recent  Insight and Judgment: No  Insight and Judgment: Poor Judgment, Poor Insight  Present Suicidal Ideation: No  Present Homicidal Ideation: No    Tobacco Screening:  Practical Counseling, on admission, zana X, if applicable and completed (first 3 are required if patient doesn't refuse):            ( )  Recognizing danger situations (included triggers and roadblocks)                    ( )  Coping skills (new ways to manage stress, exercise, relaxation techniques, changing routine, distraction)                                                           (x )  Basic information about quitting (benefits of quitting, techniques in how to quit, available

## 2021-08-10 NOTE — FLOWSHEET NOTE
Purposeful Rounding    Patient Location Patient room    Patient willing to engage in conversationYes    Presentation/behavior Cooperative and Pleasant    Affect Neutral/Euthymic(normal)    Concerns reported: none    PRN medications given:no    Environmental assessment Room free from clutter, Clear path to bathroom  and Adequate lighting    Fall prevention interventions in place: Yellow non-skid socks on    Daily [place] Fall Risk Score :63    Daily Choi Fall Risk Score : low

## 2021-08-10 NOTE — FLOWSHEET NOTE
Purposeful Rounding    Patient Location Patient room    Patient willing to engage in conversationNo    Presentation/behavior Other asleep*    Affect Neutral/Euthymic(normal)    Concerns reported: none    PRN medications given:no    Environmental assessment Room free from clutter, Clear path to bathroom  and Adequate lighting    Fall prevention interventions in place: Yellow non-skid socks on    Daily [place] Fall Risk Score :65    Daily Choi Fall Risk Score : low

## 2021-08-10 NOTE — FLOWSHEET NOTE
08/10/21 1112   Activities of Daily Living   Patient Requires assistance with daily self-care activities? Yes   Patient identified needing assistance with: Bathing;Food Preparation;Grocery Shopping;Housecleaning   Details \"I can't do anything. It makes me nervous. \"   Person Assisting Other  (grandmother)   Person Assisting details grandmother   Leisure Activity 1   3 Favorite Leisure Activities video games with uncle   Frequency > 2 hours/day   Last time this week   Barriers to participating  motivation   Leisure Activity 2   [address] [facility] [address]  listening to music - [employer]   Frequency  > 2 hours/day   Last time  can't remember   Leisure Activity 3   [address]  Prefers indoor activity   Frequency  > 2 hours/day   Last time  can't remember   Social   Patient reports spending the majority of their free time with one other person   Patient verbalizes a preference for spending free time with one other person   Patients perception of support system less healthy   Patients perception of barriers to socializing with others include(s) lack of motivation/interest;lack of comfort;transportation;finances   Social Details PT reports decreased activity, \"Everything makes me nervous\"   Beliefs & Coping   Has difficulty dealing with feelings   Yes   Internalizes feelings/Keeps feelings in No   Externalizes feelings through aggressiveness or poor temper control  Yes   Feels uncomfortable around others  Yes   Has difficulty talking to others  No   Depends on others for direction or decisions Yes   Difficulty dealing with anger of others  No   Difficulty dealing with own anger  No   Difficulty managing stress Yes   Frequently has difficulty with relationships  Yes   which,who,where with friends/peers;in family   Has recently perceived/experienced loss, disappointment, humiliation or failure  NO   General perception about self likes self   Attitude about abilities more successful than not   Locus of Control  most of the time   Belief about recovery Recovery is possible   Patient Identified Strengths  good w kids, situational comedy, creative   Patient Identified Limitations  social anxiety, transportation, motivation, comfort   Perception of most stressful event prior to hospitalization \"Nothing really happened. I was just going through it. \"   Perception of changes needed \"I need to change my reaction to things. I'd prefer to not lose my mind. \"   Strengths and Limitations   Strengths Independent in basic self-care activities;Demonstrates basic social skills   Limitations Tendency to isolate self;General negative or hopeless attitude about future/recovery     Completed by Job Landin, MM, MT-BC

## 2021-08-10 NOTE — GROUP NOTE
Group Therapy Note    Date: 8/10/2021    Group Start Time: 1000  Group End Time: 1100  Group Topic: Art Therapy     [address] [facility]        Group Therapy Note    Attendees: 15         Patient's Goal:  Patients were invited to participate in a guided visualization exercise using all five of their senses to imagine themselves in a safe, peaceful place. Patients were encouraged to share their experience with the guided visualization and then to use an art material of their choosing to represent themselves in their safe place. Lastly, patients were asked to process their art with the group. Notes:  Vick Deshpande appeared to engage in the guided visualization of safe space and engaged in art making to further explore the topic. Status After Intervention:  Improved    Participation Level:  Active Listener and Interactive    Participation Quality: Appropriate, Attentive and Sharing      Speech:  normal      Thought Process/Content: Logical      Affective Functioning: Blunted      Mood: anxious and depressed      Level of consciousness:  Alert, Oriented x4 and Attentive      Response to Learning: Able to verbalize current knowledge/experience, Able to verbalize/acknowledge new learning, Able to retain information and Capable of insight      Endings: None Reported    Modes of Intervention: Education, Support, Socialization, Exploration, Clarifying, Problem-solving, Activity and Media      Discipline Responsible: Psychoeducational Specialist      Signature:  Ayo Cabrera

## 2021-08-11 LAB
CHOLESTEROL, TOTAL: 167 MG/DL (ref 0–199)
HDLC SERPL-MCNC: 45 MG/DL (ref 40–60)
LDL CHOLESTEROL CALCULATED: 106 MG/DL
TRIGL SERPL-MCNC: 81 MG/DL (ref 0–150)
TSH SERPL DL<=0.05 MIU/L-ACNC: 2.09 UIU/ML (ref 0.27–4.2)
VLDLC SERPL CALC-MCNC: 16 MG/DL

## 2021-08-11 PROCEDURE — 80061 LIPID PANEL: CPT

## 2021-08-11 PROCEDURE — 99233 SBSQ HOSP IP/OBS HIGH 50: CPT | Performed by: PSYCHIATRY & NEUROLOGY

## 2021-08-11 PROCEDURE — 83036 HEMOGLOBIN GLYCOSYLATED A1C: CPT

## 2021-08-11 PROCEDURE — 6370000000 HC RX 637 (ALT 250 FOR IP): Performed by: PSYCHIATRY & NEUROLOGY

## 2021-08-11 PROCEDURE — 97165 OT EVAL LOW COMPLEX 30 MIN: CPT

## 2021-08-11 PROCEDURE — 84443 ASSAY THYROID STIM HORMONE: CPT

## 2021-08-11 PROCEDURE — 36415 COLL VENOUS BLD VENIPUNCTURE: CPT

## 2021-08-11 PROCEDURE — 1240000000 HC EMOTIONAL WELLNESS R&B

## 2021-08-11 PROCEDURE — 97535 SELF CARE MNGMENT TRAINING: CPT

## 2021-08-11 RX ORDER — ARIPIPRAZOLE 10 MG/1
10 TABLET ORAL DAILY
Status: DISCONTINUED | OUTPATIENT
Start: 2021-08-12 | End: 2021-08-13 | Stop reason: HOSPADM

## 2021-08-11 RX ADMIN — LAMOTRIGINE 50 MG: 25 TABLET ORAL at 08:49

## 2021-08-11 RX ADMIN — ARIPIPRAZOLE 5 MG: 10 TABLET ORAL at 08:49

## 2021-08-11 ASSESSMENT — PAIN DESCRIPTION - PAIN TYPE: TYPE: ACUTE PAIN;OTHER (COMMENT)

## 2021-08-11 ASSESSMENT — PAIN SCALES - GENERAL: PAINLEVEL_OUTOF10: 3

## 2021-08-11 NOTE — FLOWSHEET NOTE
Purposeful Rounding    Patient Location Patient room    Patient willing to engage in conversationNo    Presentation/behavior Other asleep*    Affect Neutral/Euthymic(normal)    Concerns reported: none    PRN medications given:no    Environmental assessment Room free from clutter, Clear path to bathroom  and Adequate lighting    Fall prevention interventions in place: Yellow non-skid socks on    Daily [place] Fall Risk Score :63    Daily [place] Fall Risk Score : low

## 2021-08-11 NOTE — FLOWSHEET NOTE
Purposeful Rounding     Patient Location Patient room     Patient willing to engage in conversation No, asleep     Presentation/behavior Cooperative and Pleasant     Affect Neutral/Euthymic(normal)     Concerns reported: none     PRN medications given:no     Environmental assessment Room free from clutter, Clear path to bathroom  and Adequate lighting     Fall prevention interventions in place: Yellow non-skid socks on     Daily [place] Fall Risk Score :63     Daily Choi Fall Risk Score : low

## 2021-08-11 NOTE — PROGRESS NOTES
Department of Psychiatry  Attending Progress Note  Chief Complaint: follow-up depression and si  Patient's chart was reviewed and collaborated with  about the treatment plan. SUBJECTIVE:    Patient is feeling unchanged. Suicidal ideation:  passive. Patient does not have medication side effects. Pt stated that she is feeling very sad because she is thinking of her grandmother. Pt stated that she has been having si and having visual hallucinations of ppl outside her window that appear to be extremely distressing to her. Will inc her abilify at this time and she appears agreeable at this time. ROS: Patient has new complaints: no  Sleeping adequately:  Yes   Appetite adequate: Yes  Attending groups: No: isolated to room  Visitors:No    OBJECTIVE    Physical  VITALS:  /71   Pulse 80   Temp 97.5 °F (36.4 °C) (Temporal)   Resp 16   Ht 5' 6\" (1.676 m)   Wt 267 lb (121.1 kg)   SpO2 96%   BMI 43.09 kg/m²     Mental Status Examination:  Patients appearance was well-appearing, hospital attire, in chair, fair grooming and fair hygiene. Thoughts are Logical. Homicidal ideations none. No abnormal movements, tics or mannerisms. Memory intact Aims 0. Concentration Good. Alert and oriented X 4. Insight and Judgement limited to poor. Patient was cooperative.  Patient gait normal. Mood depressed, affect mood incongruent and inappropriate to situation Hallucinations visual hallucinations, suicidal ideations general plan to harm self Speech fluent and spontaneous  Data  Labs:   Admission on 08/09/2021   Component Date Value Ref Range Status    TSH 08/11/2021 2.09  0.27 - 4.20 uIU/mL Final            Medications  Current Facility-Administered Medications: acetaminophen (TYLENOL) tablet 650 mg, 650 mg, Oral, Q4H PRN  ibuprofen (ADVIL;MOTRIN) tablet 400 mg, 400 mg, Oral, Q6H PRN  magnesium hydroxide (MILK OF MAGNESIA) 400 MG/5ML suspension 30 mL, 30 mL, Oral, Daily PRN  lamoTRIgine (LAMICTAL) tablet 50 mg, 50 mg, Oral, Daily  ARIPiprazole (ABILIFY) tablet 5 mg, 5 mg, Oral, Daily    ASSESSMENT AND PLAN     axis I: bad depressed  Axis 2: deferred   [place] 3: See Medical History  Axis 4: Occupational problems      1. Patient s symptoms   show no change. Will inc abilify to 10 mg  2. Probable discharge is 2-3 days  3. Discharge planning is incomplete  4 Suicidal ideation is unchanged  5 I spent 35 minutes face to face and more than 50 % was spent coordinating care, exploring sx's and discussing pharmacotherapy

## 2021-08-11 NOTE — PLAN OF CARE
Problem: Altered Mood, Depressive Behavior:  Goal: Able to verbalize and/or display a decrease in depressive symptoms  Description: Able to verbalize and/or display a decrease in depressive symptoms  Outcome: Ongoing     Problem: Altered Mood, Depressive Behavior:  Goal: Ability to disclose and discuss suicidal ideas will improve  Description: Ability to disclose and discuss suicidal ideas will improve  8/10/2021 2331 by Angel Bermudez RN  Outcome: Ongoing   Patient alert and oriented x 4. Patient isolative to room and self tonight. Reports fleeting SI with no plan. Contracts for safety on unit. Does not have any issues with sleeping at night. Did not attend group.

## 2021-08-11 NOTE — FLOWSHEET NOTE
Purposeful Rounding    Patient Location Day room    Patient willing to engage in conversationYes    Presentation/behavior Cooperative    Affect Inappropriate/Incongruent    Concerns reported: none    PRN medications given:no    Environmental assessment Room free from clutter, Clear path to bathroom  and Adequate lighting    Fall prevention interventions in place: Yellow non-skid socks on    Daily [place] Fall Risk Score :63    Daily Choi Fall Risk Score : low

## 2021-08-11 NOTE — FLOWSHEET NOTE
Purposeful Rounding     Patient Location Patient room     Patient willing to engage in conversation No, asleep     Presentation/behavior Cooperative and Pleasant     Affect Neutral/Euthymic(normal)     Concerns reported: none     PRN medications given:no     Environmental assessment Room free from clutter, Clear path to bathroom  and Adequate lighting     Fall prevention interventions in place: Yellow non-skid socks on     Daily [place] Fall Risk Score :63     Daily [place] Fall Risk Score : low

## 2021-08-11 NOTE — PLAN OF CARE
Problem: Suicide risk  Goal: Provide patient with safe environment  Description: Provide patient with safe environment  Outcome: Ongoing     Problem: Altered Mood, Depressive Behavior:  Goal: Ability to disclose and discuss suicidal ideas will improve  Description: Ability to disclose and discuss suicidal ideas will improve  8/11/2021 1153 by Baron Silvino RN  Outcome: Ongoing   Patient spent the first half of shift in her and then after meeting with OT she remained out on the unit journaling and watching TV. She is cooperative when interacting with staff. She is A&Ox4 and denies that she is having thoughts to harm self or others. She denies that she is hearing voices, but claims that she is still experiencing visual hallucinations and claims that earlier today she saw people outside her window of the room. She claims that it was stressful, but she is doing better now. Writer has been doing multiple rounds through the shift and she never appeared stressed or anxious and appeared to be resting comfortably. She also does not appear to be RTIS or Tb'ing. Her affect does not match what she is telling staff, her affect is this overly friendly false presenting. When she is out on the unit she stays on the peripheral. She did not make any delusional statements while interacting with staff. She is cooperative with taking medications. She has been no issue on the unit.

## 2021-08-11 NOTE — PROGRESS NOTES
feels like she is in a roller coaster. She also reports that she hears white noise and sometimes she hears a dinner party in the bathroom. She also admist to seeing bugs coming out of the wall. Dx: axis I: bad depressed  Axis 2: deferred   [place] 3: See Medical History  Axis 4: Occupational problems      Preadmission Environment:    Pt. Lives   [] alone  [x]with Great-Grandmother  Home environment:   []Apartment   [x]one story house  []two story home. Steps to enter first floor:    [] No steps     [x]  1 Steps to enter   [] Railings    Bathroom: [] Bath Tub Shower  [x]Walk in [place]  [] [place] owned: [] RW [] SW  []Rollator []W/C  []Shower Chair []BSC []Reacher  [employer] [] Other     Preadmission Status / PLOF:  History of falls   []Yes  [x]No  Pt. Able to drive   []Yes  [x]No  Grandmother provides transportation. Pt Fully independent for ADLs/IADLs. [x]Yes  []No  \"Sometimes I forget when I showered or ate. \"  Pt. Required assistance from family for:  []Bathing []Dressing []Cooking []Cleaning  []Laundry  []Other :   Pt. Fully independent for transfers and gait and walked with: [x]No Device  []Walker   []Cane  Sleep Hygiene:  14 hours avg sleep; fragmented  Income:  UDF; 30+ hours  Financial Management:  Self; \"I don't pay any bills. \"  Leisure Interests:  Video games with Uncle, go out to eat with Uncle, music  Medication Management:  Self; sets alarm on phone  Health Management:  Pt. Reports that she dose not have a PCP, Psychiatrist or therapist.  Social Network:  Alphonso Browning, Grandmother  Stressors:  1. 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 \"  2. Impulsivity. 3. Feeling that no one likes me. Coping Skills:  Go to bed, go cry in the bathroom. isolate    Pain  [x]Yes  []No  Rating: 3:10  Location:  Lower back  Pain Medicine Status: [] Denies need  [] Pain med requested  [] RN notified. Cognition    A&Ox4, patient  cooperative. Follows []1 step and [x]2 step commands.     Upper Extremity ROM: WFL, pt able to perform all bed mobility, transfers, and gait without ROM limitation. Upper Extremity Strength:    WFL, pt able to perform all bed mobility, transfers, and gait without strength limitation. Upper Extremity Sensation:    No apparent deficits. Upper Extremity Proprioception:    No apparent deficits. Skin Integrity:  WFL     Coordination and Tone:   WFL    Balance  Static Sitting:  [x] Good [] Fair [] Poor  Dynamic Sitting:  [x] Good [] Fair [] Poor  Static Standing: [x] Good [] Fair [] Poor  Dynamic Standing: [x] Good [] Fair [] Poor    Bed mobility:  Independent  Supine to sit:  Sit to supine:  Scooting to head of bed:  Scooting in sitting:  Rolling:  Bridging:    Transfers:  Independent  Sit to stand:  Stand to sit:  Bed/Chair to/from toilet:    Self Care: Independent  Toileting:  Grooming:  Dressing:    Exercise / Activities Initiated:   Pt. Educated on role of OT. Pt. Participated in:  Eval, ACLS, ADL Retraining, interest checklist [facility] adapted version, coping skills. Assessment of Deficits:   Pt demonstrated decreased activity tolerance, decreased safety awareness, decreased cognition and decreased ADL/IADL status. Pt. Limited during evaluation by decrease cognition. At end of evaluation, pt. In gathering room. Goal(s) : To be met in 3 Visits:  1). Pt. To complete ACLS. (Completed 8/11/2021)  2). Pt. To verbalize understanding of sleep hygiene education. To be met in 5 Visits:  1). Pt. To complete 1 SMART long term goal and 2 SMART short term goals with mod assist.  2). Pt. To verbalize 3 new coping skills. 3). Pt. To complete interest check list.    4). Pt. To verbalize understanding of 3 communication styles. 5). Pt. To complete wellness plan. 6). Pt. To complete a daily schedule of healthy activities/routines with mod assist.   7). Pt. To identify 2 memory strategies to take medications as prescribed.      Rehabilitation Potential:  Good for goals listed above.    Strengths for achieving goals include:  PLOF  Barriers to achieving goals include:  Decreased Cogntion     Plan: To be seen 2-5x/week while in acute care setting for therapeutic exercises/act, ADL retraining, NMR and patient/caregiver ed/instruction.      Timed Code Treatment Minutes:   30  minutes    Total Treatment Time:    40  minutes    Signature and License Number    Dunia Bledsoe OTR/L  #[id number]        If patient discharges from this facility prior to next visit, this note will serve as the Discharge Summary

## 2021-08-12 LAB
ESTIMATED AVERAGE GLUCOSE: 99.7 MG/DL
HBA1C MFR BLD: 5.1 %

## 2021-08-12 PROCEDURE — 6370000000 HC RX 637 (ALT 250 FOR IP): Performed by: PSYCHIATRY & NEUROLOGY

## 2021-08-12 PROCEDURE — 1240000000 HC EMOTIONAL WELLNESS R&B

## 2021-08-12 RX ADMIN — LAMOTRIGINE 50 MG: 25 TABLET ORAL at 10:00

## 2021-08-12 RX ADMIN — ARIPIPRAZOLE 10 MG: 10 TABLET ORAL at 10:00

## 2021-08-12 ASSESSMENT — PAIN SCALES - GENERAL: PAINLEVEL_OUTOF10: 0

## 2021-08-12 NOTE — BH NOTE
Purposeful Rounding    Patient Location: Patient room    Patient willing to engage in conversation: No    Presentation/behavior: asleep    Affect: asleep    Concerns reported: n/a    PRN medications given: n/a    Environmental assessment: No safety hazards noted    Fall prevention interventions in place: Yellow non-skid socks on    Daily [place] Fall Risk Score: 63    Daily [place] Fall Risk Score: 25

## 2021-08-12 NOTE — PLAN OF CARE
Problem: Suicide risk  Goal: Provide patient with safe environment  Description: Provide patient with safe environment  8/11/2021 2210 by Asha Smith RN  Outcome: Ongoing  8/11/2021 1153 by Melo De La Rosa RN  Outcome: Ongoing     Problem: Altered Mood, Depressive Behavior:  Goal: Able to verbalize and/or display a decrease in depressive symptoms  Description: Able to verbalize and/or display a decrease in depressive symptoms  Outcome: Ongoing  Goal: Ability to disclose and discuss suicidal ideas will improve  Description: Ability to disclose and discuss suicidal ideas will improve  8/11/2021 2210 by Asha Smith RN  Outcome: Ongoing  8/11/2021 1153 by Melo De La Rosa RN  Outcome: Ongoing  Goal: Absence of self-harm  Description: Absence of self-harm  Outcome: Ongoing     Problem: Falls - Risk of:  Goal: Will remain free from falls  Description: Will remain free from falls  Outcome: Ongoing  Goal: Absence of physical injury  Description: Absence of physical injury  Outcome: Ongoing   No falls noted thus far this shift. Patient initially denied SI/HI,A/V hallucinations, came out to the nurses station later to c/o SI with desire to fracture her nose. Patient denied ever fracturing her nose in the past. She said that someone else had fractured her nose in the past. She is able to CFS and was brought out of her  room to provide distraction from her intrusive thoughts. She described her mood as \"Sad\" and she is encouraged to come out of her room and participate in groups to increase her coping skills. She was cooperative with coming out of her room. Safety checks continue Q 15 minutes through out the shift.

## 2021-08-12 NOTE — PROGRESS NOTES
Patient said that she is feeling unsafe. We discussed activities to distract herself. She was able to relate that she can color as she didn't want to be around the other patients on the unit. She decided to color and she is in the day area of the unit. Will continue to monitor patient for safety. She is able to contract for safety and was encouraged to stay out of her room until she is able to tell me that she won't break her nose as she said that she would do this.

## 2021-08-12 NOTE — BH NOTE
Purposeful Rounding    Patient Location: Patient room    Patient willing to engage in conversation: Yes    Presentation/behavior: Guarded/Suspicious, Withdrawn and Cooperative    Affect: Flat/blunted    Concerns reported: none    PRN medications given: none    Environmental assessment: No safety hazards noted    Fall prevention interventions in place: Yellow non-skid socks on    Daily [place] Fall Risk Score: 63    Daily Choi Fall Risk Score: 25

## 2021-08-12 NOTE — GROUP NOTE
Group Therapy Note    Date: 8/12/2021    Group Start Time: 1600  Group End Time: 0813  Group Topic: Healthy Living/Wellness    [address], RN        Group Therapy Note    Attendees: 3/12         Patient's Goal:  To discuss stress management strategies to help improve health    Status After Intervention:  Unchanged    Participation Level: Interactive    Participation Quality: Appropriate      Speech:  normal      Thought Process/Content: Linear      Affective Functioning: Congruent      Mood: euthymic      Level of consciousness:  Alert      Response to Learning: Able to retain information      Endings: None Reported    Modes of Intervention: Support and Exploration      Discipline Responsible: Registered Nurse      Signature:  Anshu Henley RN

## 2021-08-12 NOTE — BH NOTE
Purposeful Rounding    Patient Location: Day room    Patient willing to engage in conversation: Yes    Presentation/behavior: Controlled and Cooperative    Affect: Flat/blunted    Concerns reported: none    PRN medications given: none    Environmental assessment: No safety hazards noted    Fall prevention interventions in place: Yellow non-skid socks on    Daily [place] Fall Risk Score: 63    Daily Choi Fall Risk Score: 25

## 2021-08-12 NOTE — GROUP NOTE
Group Therapy Note    Date: 8/11/2021    Group Start Time: 2020  Group End Time: 2055  Group Topic: Wrap-Up    [facility]        Group Therapy Note    Attendees: Goals and importance of goal setting discussed. Night time milieu activities discussed. Patient's Goal:  Get out of room more    Notes:  Successful     Status After Intervention:  Improved    Participation Level:  Active Listener and Interactive    Participation Quality: Appropriate and Attentive      Speech:  normal      Thought Process/Content: Logical  Linear      Affective Functioning: Congruent      Mood: anxious      Level of consciousness:  Alert and Oriented x4      Response to Learning: Progressing to goal      Endings: None Reported    Modes of Intervention: Support      Discipline Responsible: [employer]      Signature:  Chyna Melgoza

## 2021-08-12 NOTE — PROGRESS NOTES
Patient colored to distract herself tonight. During her coloring she got a call from her uncle and she returned to the nurses station to relate that it was a good phone call and that she was going to be safe tonight. She also said that he will call her tomorrow. Added talking to her uncle as a good coping skill for when she becomes worried about her safety.

## 2021-08-12 NOTE — PROGRESS NOTES
...Purposeful Rounding    Patient Location: Patient room    Patient willing to engage in conversation: No    Presentation/behavior: sleeping    Affect: sleeping    Concerns reported: n/a    PRN medications given: n/a    Environmental assessment: Room free from clutter, Clear path to bathroom  and Adequate lighting    Fall prevention interventions in place: Yellow non-skid socks on    Daily [place] Fall Risk Score: 63    Daily [place] Fall Risk Score: low

## 2021-08-12 NOTE — PROGRESS NOTES
...Purposeful Rounding     Patient Location: Patient room     Patient willing to engage in conversation: No     Presentation/behavior: sleeping     Affect: sleeping     Concerns reported: n/a     PRN medications given: n/a     Environmental assessment: Room free from clutter, Clear path to bathroom  and Adequate lighting     Fall prevention interventions in place: Yellow non-skid socks on     Daily [place] Fall Risk Score: 63     Daily [place] Fall Risk Score: low

## 2021-08-12 NOTE — PLAN OF CARE
[facility] Hospital Medicine  Progress Note    Patient Name: Boris Garcia  MRN: [id number]  Patient Class: IP- Inpatient   Admission Date: 5/24/2020  Length of Stay: 1 days  Attending Physician: Merle Hurtado MD  Primary Care Provider: To Obtain Unable    [facility] Medicine Team: [facility]OSP MED 3 Rafa Barnard DO    Subjective:     Principal Problem:Acute hypoxemic respiratory failure        HPI:  Mr. Boris Garcia is a 88 year old male presenting with a chief complaint of SOB. He has a PMH significant for atrial fibrillation (on anticoagulation) and CAD (status post stent placement). The patient reports an approximately two to three week duration of progressively worsening SOB with exertion and laying flat. Symptom is also associated with non-productive cough during episodes of SOB and progressively worsening bilateral lower extremity swelling since symptom onset. He reports symptoms are relieved by rest and sleeping on multiple pillows at night; he is currently requiring 2-3 pillows in order to minimize symptoms. He does not weigh himself and is unsure if he has gained weight. He reports having one stent placed in his heart in approximately 2009, however denies prior history of MI. He also denies symptom association with chest pain, palpitations, fevers, chills, nausea, vomiting, abdominal pain or distention, difficulty urinating or decrease in urination, or prior episodes of similar SOB. He follows with a cardiologist at [facility] (Dr. Harpreet Diaz) and reports making an appointment with him for evaluation of above symptoms, however due to progression of symptoms his daughter contacted EMS after noticing patient appearing SOB. Upon arrival, EMS noted patient to have oxygen saturations in the mid-80's and he was transported to ED here for further care.     At his baseline, he reports living alone and not requiring assistance with ambulation. He is originally from [place], but gets the  Problem: Altered Mood, Depressive Behavior:  Goal: Absence of self-harm  Description: Absence of self-harm  Outcome: Ongoing   Patient has been intermittently visible on the unit. She denies any thoughts of harming self or others at this time and CFS while on unit. Pt calm and cooperative with staff. Able to verbalize needs. Pt has remained safe and absent of self-harm this shift thus far. majority of his medical care at [facility]. His daughter (Eliana Saldaña) is his power of .     ED course: On arrival, his vital signs were significant for tachycardia (120) with respiratory status stable on 3L. Labs were notable for normal WBC, hyperkalemia (5.5), ROSA (BUN 25, Cr 1.1), transaminitis (, ALT 58), elevated BNP (223), and mildly elevated troponin (0.03). He tested negative for COVID-19. CXR was suggestive of bilateral infiltrates. He received Azithromycin, Ceftriaxone, and Lasix and was admitted to hospital medicine for further management.     Overview/Hospital Course:  Pt diuresing well on 40mg IV lasix BID w/ good UOP and improving SOB. LFTs also improving. BUN/Cr improving. Weaning oxygen. 5/25 2D echo w/ EF 50%, reduced RV systolic function, mild MV regurg, and pulm HTN. TSH and A1C WNL, HIV negative, B12 elevated.     Interval History: No acute events overnight. Patient urinated over 3L yesterday and states his breathing today subjectively feels better than yesterday although still requiring 3L NC.    Review of Systems   Constitutional: Negative for appetite change, chills, fever and unexpected weight change.   HENT: Negative for sore throat and trouble swallowing.    Eyes: Negative for photophobia and visual disturbance.   Respiratory: Positive for cough and shortness of breath.    Cardiovascular: Positive for leg swelling. Negative for chest pain and palpitations.   Gastrointestinal: Negative for abdominal pain, diarrhea, nausea and vomiting.   Genitourinary: Negative for difficulty urinating.   Musculoskeletal: Negative for back pain, myalgias and neck pain.   Skin: Negative for pallor and rash.   Neurological: Negative for light-headedness, numbness and headaches.     Objective:     Vital Signs (Most Recent):  Temp: 97.5 °F (36.4 °C) (05/26/20 0801)  Pulse: 77 (05/26/20 0801)  Resp: 16 (05/26/20 0801)  BP: 106/60 (05/26/20 0801)  SpO2: (!) 92 % (05/26/20 0801) Vital Signs (24h  Range):  Temp:  [97.5 °F (36.4 °C)-98.6 °F (37 °C)] 97.5 °F (36.4 °C)  Pulse:  [] 77  Resp:  [15-23] 16  SpO2:  [92 %-99 %] 92 %  BP: ()/(60-87) 106/60     Weight: 67.2 kg (148 lb 2.4 oz)  Body mass index is 21.26 kg/m².    Intake/Output Summary (Last 24 hours) at 5/26/2020 1058  Last data filed at 5/26/2020 0600  Gross per 24 hour   Intake 420 ml   Output 3200 ml   Net -2780 ml      Physical Exam   Constitutional: He is oriented to person, place, and time. He appears well-developed and well-nourished. No distress. Nasal cannula in place.   HENT:   Head: Normocephalic and atraumatic.   Mouth/Throat: Oropharynx is clear and moist and mucous membranes are normal.   Eyes: Pupils are equal, round, and reactive to light. Conjunctivae are normal.   Neck: No JVD present.   Cardiovascular: Normal rate, regular rhythm, normal heart sounds and normal pulses.   Pulmonary/Chest: Effort normal. No respiratory distress. He has decreased breath sounds in the right upper field and the left upper field. He has rales.   There are bilateral diffuse rales, more in the upper lobes bilaterally.   Abdominal: Soft. Normal appearance and bowel sounds are normal. He exhibits no distension. There is no tenderness.   Musculoskeletal:   Trace pretibial pitting edema bilaterally, improved from yesterday. Bilateral pretibial hyperpigmentation c/w stasis dermatitis. There is no tenderness with calf palpation.    Neurological: He is alert and oriented to person, place, and time.   Skin: Skin is warm and dry. No bruising and no rash noted.       Significant Labs:   BMP:   Recent Labs   Lab 05/26/20  0413   GLU 80      K 3.7   CL 98   CO2 37*   BUN 21   CREATININE 0.9   CALCIUM 8.6*   MG 1.8     CBC:   Recent Labs   Lab 05/24/20  2222 05/25/20  0714 05/25/20  1508   WBC 7.10 7.36 7.58   HGB 14.2 14.1 14.5   HCT 43.9 44.7 44.7    216 214     All pertinent labs within the past 24 hours have been reviewed.    Significant  Imaging: I have reviewed all pertinent imaging results/findings within the past 24 hours.      Assessment/Plan:      * Acute hypoxemic respiratory failure  This is a 88 year old male with PMH notable for atrial fibrillation (on anticoagulation) and CAD (status post stent placement in 2009) who is presenting on 05/24/2020 with almost two week duration of progressively worsening dyspnea on exertion, orthopnea, and lower extremity edema associated with new diagnosis of respiratory failure requiring supplemental oxygen. His presentation is notable for jayy signs of volume overload on exam, elevated BNP, and CXR showing bilateral interstitial opacities. Differential diagnoses favor respiratory failure secondary to acute heart failure of unclear etiology vs occult infection.     -Continued diuresis with LASIX 40 mg IV BID, de-escalated to qd once patient's Bps trended down and was net negative 2.7L in one day  -Given absent fever, normal WBC, and chronic symptom presentation, will hold off on antibiotic administration and target cardiac etiology of presentation. However if patient achieves euvolemia and still requiring O2, may consider sputum culture (if able to produce) and repeat CXR to see if there is an underlying infectious process  -Continue home anticoagulation for atrial fibrillation.   -Screening for HIV and thyroid abnormalities WNL  -TTE shows LV EF 50% however moderate RV systolic dysfunction, CVP 15 and PA pressure 69   -Lower extremity US negative for DVT  -Titrate down supplemental oxygen support to goal >92%; avoid hyper-oxygenation.   -Telemetry ordered.   -Strict I/O's and daily weights.   -Cardiac and 2L fluid restricted diet ordered.     Pulmonary hypertension  Noted on TTE. Likely 2/2 overall volume overload, would need to reassess outpatient once euvolemic.    ROSA (acute kidney injury)  Admission notable for BUN/Cr >20 and GFR decline <60 from unknown prior baseline.    -Trending renal function  "daily  -Strict ins and outs  -Avoid nephrotoxic meds    Essential hypertension  Home regimen: Lisinopril    -Continued home lisinopril initially, but held on 5/26 due to normal Bps after aggressive diuresis    Do not resuscitate discussion  Discussion had at bedside regarding possibility of progression of respiratory failure and possible need for mechanical ventilation. Patient stated that he would not want to be "on a breathing machine" or have chest compressions should his heart stop, due to his advanced age. DNR form completed in chart; signed by attending physician.     Coronary artery disease involving native coronary artery of native heart without angina pectoris  Status post stent placement in approximately 2009. No longer on ASA per his cardiologist.      -Continue home Statin    Transaminitis  Suspected secondary to congestive hepatopathy in the setting of new CHF exacerbation.     -Trending liver function daily.   -Improved with diuresis    Atrial fibrillation  -See assessment for respiratory failure.     Acute on chronic congestive heart failure  -See assessment for respiratory failure      VTE Risk Mitigation (From admission, onward)         Ordered     rivaroxaban tablet 15 mg  With dinner      05/25/20 0050     IP VTE HIGH RISK PATIENT  Once      05/25/20 0050     Place sequential compression device  Until discontinued      05/25/20 0050                      Rafa Barnard DO  Department of Hospital Medicine   [facility]    "

## 2021-08-12 NOTE — FLOWSHEET NOTE
Group Therapy Note    Date: 8/12/2021  Start Time: 1300  End Time:  1400  Number of Participants: 5    Type of Group: Music Group    Notes:  Pt present for Music Group. Pt actively participated by making song selections and singing along to music. Participation Level:  Active Listener and Interactive    Participation Quality: Appropriate and Attentive      Speech:  normal      Affective Functioning: Congruent      Endings: None Reported    Modes of Intervention: Support, Socialization, Activity and Media      Discipline Responsible: Chaplain Shona Urban       08/12/21 1074   Encounter Summary   Services provided to: Patient   Continue Visiting   (8/12 Music Group)   Complexity of Encounter Moderate   Length of Encounter 1 hour

## 2021-08-12 NOTE — PROGRESS NOTES
...Purposeful Rounding    Patient Location: Day room    Patient willing to engage in conversation: Yes    Presentation/behavior: Anxious, Imulsive and Cooperative    Affect: Constricted    Concerns reported: no    PRN medications given: no    Environmental assessment: Room free from clutter    Fall prevention interventions in place: Yellow non-skid socks on    Daily [place] Fall Risk Score: 63    Daily Choi Fall Risk Score: low

## 2021-08-13 VITALS
TEMPERATURE: 98.3 F | HEIGHT: 66 IN | BODY MASS INDEX: 42.91 KG/M2 | OXYGEN SATURATION: 97 % | DIASTOLIC BLOOD PRESSURE: 94 MMHG | WEIGHT: 267 LBS | HEART RATE: 87 BPM | SYSTOLIC BLOOD PRESSURE: 125 MMHG | RESPIRATION RATE: 16 BRPM

## 2021-08-13 PROBLEM — F33.1 MDD (MAJOR DEPRESSIVE DISORDER), RECURRENT EPISODE, MODERATE (HCC): Status: ACTIVE | Noted: 2021-08-13

## 2021-08-13 PROCEDURE — 6370000000 HC RX 637 (ALT 250 FOR IP): Performed by: PSYCHIATRY & NEUROLOGY

## 2021-08-13 PROCEDURE — 99239 HOSP IP/OBS DSCHRG MGMT >30: CPT | Performed by: PSYCHIATRY & NEUROLOGY

## 2021-08-13 PROCEDURE — 5130000000 HC BRIDGE APPOINTMENT

## 2021-08-13 RX ORDER — LAMOTRIGINE 25 MG/1
50 TABLET ORAL DAILY
Qty: 60 TABLET | Refills: 0 | Status: ON HOLD | OUTPATIENT
Start: 2021-08-14 | End: 2021-08-23 | Stop reason: HOSPADM

## 2021-08-13 RX ORDER — ARIPIPRAZOLE 10 MG/1
10 TABLET ORAL DAILY
Qty: 30 TABLET | Refills: 0 | Status: ON HOLD | OUTPATIENT
Start: 2021-08-14 | End: 2021-08-23 | Stop reason: SDUPTHER

## 2021-08-13 RX ADMIN — LAMOTRIGINE 50 MG: 25 TABLET ORAL at 09:33

## 2021-08-13 RX ADMIN — ARIPIPRAZOLE 10 MG: 10 TABLET ORAL at 09:33

## 2021-08-13 NOTE — PROGRESS NOTES
Purposeful Rounding     Patient Location: Patient room     Patient willing to engage in conversation: No     Presentation/behavior: sleeping     Affect: sleeping     Concerns reported: n/a     PRN medications given: n/a     Environmental assessment: Room free from clutter, Clear path to bathroom  and Adequate lighting     Fall prevention interventions in place: Yellow non-skid socks on     Daily [place] Fall Risk Score: 63     Daily Choi Fall Risk Score: low

## 2021-08-13 NOTE — GROUP NOTE
Group Therapy Note    Date: 8/12/2021    Group Start Time: 1950  Group End Time: 2025  Group Topic: Wrap-Up    [facility]        Group Therapy Note    Attendees: Goals and importance of goal setting discussed. Night time milieu activities discussed. Patient's Goal:  Get out of bed and socialize    Notes:  Successful     Status After Intervention:  Improved    Participation Level:  Active Listener and Interactive    Participation Quality: Appropriate and Attentive      Speech:  normal      Thought Process/Content: Logical  Linear      Affective Functioning: Congruent      Mood: euthymic      Level of consciousness:  Alert and Oriented x4      Response to Learning: Progressing to goal      Endings: None Reported    Modes of Intervention: Support      Discipline Responsible: Behavorial Health Tech      Signature:  Joe Galeano

## 2021-08-13 NOTE — BH NOTE
Purposeful Rounding     Patient Location: Day room     Patient willing to engage in conversation:  Yes     Presentation/behavior: Controlled, Cooperative and Pleasant     Affect: Neutral/Euthymic(normal)     Concerns reported: none     PRN medications given: none     Environmental assessment: No safety hazards noted     Fall prevention interventions in place: Yellow non-skid socks on     Daily [place] Fall Risk Score: 59     Daily Choi Fall Risk Score: 25

## 2021-08-13 NOTE — BH NOTE
Bridge Appointment completed: Reviewed Discharge Instructions with patient. Patient verbalizes understanding and agreement with the discharge plan using the teachback method. Referral for Outpatient Tobacco Cessation Counseling, upon discharge (zana X if applicable and completed):    ( )  Hospital staff assisted patient to call Quit Line or faxed referral                                   during hospitalization                  ( )  Recognizing danger situations (included triggers and roadblocks), if not completed on admission                    ( )  Coping skills (new ways to manage stress, exercise, relaxation techniques, changing routine, distraction), if not completed on admission                                                           ( )  Basic information about quitting (benefits of quitting, techniques in how to quit, available resources, if not completed on admission  ( ) Referral for counseling faxed to [facility]   ( ) Patient refused referral  ( ) Patient refused counseling  (X) Patient refused smoking cessation medication upon discharge    [facility]  Discharge Note    Pt discharged with followings belongings:   Dentures: None  Vision - Corrective Lenses: Glasses  Hearing Aid: None  Jewelry: Bracelet  Body Piercings Removed: N/A  Clothing: Shirt, Pants, Footwear, Socks, Undergarments (Comment)  Were All Patient Medications Collected?: Not Applicable  Other Valuables: Cell phone, Money (Comment), Keys (21 dollars)   Valuables sent home with patient or returned to patient. Patient education on aftercare instructions: yes  Information faxed to [facility] by Todd Sims RN. at 3:48 PM .Patient verbalize understanding of AVS:  yes.     Status EXAM upon discharge:  Status and Exam  Normal: Yes  Facial Expression: Brightened  Affect: Appropriate  Level of Consciousness: Alert  Mood:Normal: Yes  Mood: Other (Comment) (WNL; euthymic)  Motor Activity:Normal: Yes  Motor Activity: Decreased  Interview Behavior: Cooperative  Preception: Barron to Person, Rue Loni to Time, Barron to Place, Barron to Situation  Attention:Normal: Yes  Attention: Distractible  Thought Processes: Other(See comment) (WNL; linear)  Thought Content:Normal: Yes  Thought Content: Preoccupations  Hallucinations: None  Delusions: No  Memory:Normal: Yes  Memory: Poor Recent  Insight and Judgment: Yes  Insight and Judgment: Other(See comment) (WNL; IMPROVED)  Present Suicidal Ideation: No  Present Homicidal Ideation: No      Metabolic Screening:    Lab Results   Component Value Date    LABA1C 5.1 08/11/2021       Lab Results   Component Value Date    CHOL 167 08/11/2021     Lab Results   Component Value Date    TRIG 81 08/11/2021     Lab Results   Component Value Date    HDL 45 08/11/2021     No components found for: [facility] EVALUATION AND TREATMENT [place]  Lab Results   Component Value Date    LABVLDL 16 08/11/2021       Ralph Mayberry RN

## 2021-08-13 NOTE — PROGRESS NOTES
..Purposeful Rounding    Patient Location: Patient room    Patient willing to engage in conversation: No    Presentation/behavior: sleeping    Affect: sleeping    Concerns reported: n/a    PRN medications given: n/a    Environmental assessment: Room free from clutter, Clear path to bathroom  and Adequate lighting    Fall prevention interventions in place: Yellow non-skid socks on    Daily Dean Fall Risk Score: 63    Daily Choi Fall Risk Score: low

## 2021-08-13 NOTE — BH NOTE
Purposeful Rounding    Patient Location: Day room    Patient willing to engage in conversation: Yes    Presentation/behavior: Controlled, Cooperative and Pleasant    Affect: Neutral/Euthymic(normal)    Concerns reported: none    PRN medications given: none    Environmental assessment: No safety hazards noted    Fall prevention interventions in place: Yellow non-skid socks on    Daily [place] Fall Risk Score: 59    Daily Choi Fall Risk Score: 25

## 2021-08-13 NOTE — GROUP NOTE
Group Therapy Note     Date: 8/13/2021     Group Start Time: 1000  Group End Time: 9404  Group Topic: Psychoeducation     [facility], MSW           Group Therapy Note     Mode of Intervention: Ofelia Analysis     Song Used: Many the [facility] by Asha Kate     Topics during Discussion: avoidance behaviors, motivation for change, finding balance between gómez and responsibility, rewarding positive behavioral change     Attendees: 10           Notes:  Present and attentive to all discussions with peers, did not engage in reflective processing.     Status After Intervention:  Unchanged     Participation Level:  Active Listener     Participation Quality: Appropriate and Attentive        Speech:  mute        Thought Process/Content: Logical        Affective Functioning: Congruent        Mood: euthymic        Level of consciousness:  Alert and Attentive        Response to Learning: Capable of insight and Progressing to goal        Endings: None Reported     Modes of Intervention: Education, Support, Socialization, Exploration, Clarifying and Media        Discipline Responsible: Psychoeducational Specialist        Signature:  Johanna Barber MM, MT-BC

## 2021-08-13 NOTE — GROUP NOTE
Group Therapy Note    Date: 8/13/2021    Group Start Time: 2391  Group End Time: 1430  Group Topic: [facility]        Group Therapy Note    Attendees: 9         Patient's Goal: Patients were invited to engage in an Art Therapy group using open art studio. Patients were encouraged to practice self-care by asking themselves - and sharing with the group - what it is they needed or could give themselves during the art therapy session. Patients were then invited to experiment with a variety of art materials to practice creative self-expression as a coping strategy and to work towards their self-identified goal. Towards the end of session, patients were encouraged to share and process their artwork with the group. Notes:  Kwaku Salcedo shared a need \"peace,\" was active in art making, engaged in appropriate and supportive conversation with peers, and processed artwork at the end of session. Status After Intervention:  Improved    Participation Level:  Active Listener and Interactive    Participation Quality: Appropriate, Attentive, Sharing and Supportive      Speech:  normal      Thought Process/Content: Logical      Affective Functioning: Congruent      Mood: euthymic      Level of consciousness:  Alert, Oriented x4 and Attentive      Response to Learning: Able to verbalize current knowledge/experience, Able to verbalize/acknowledge new learning, Able to retain information and Capable of insight      Endings: None Reported    Modes of Intervention: Education, Support, Socialization, Exploration, Clarifying, Problem-solving, Activity and Media      Discipline Responsible: Psychoeducational Specialist      Signature:  Ayo Blue

## 2021-08-18 ENCOUNTER — HOSPITAL ENCOUNTER (INPATIENT)
Age: 21
LOS: 5 days | Discharge: HOME | DRG: 753 | End: 2021-08-23
Attending: PSYCHIATRY & NEUROLOGY | Admitting: PSYCHIATRY & NEUROLOGY
Payer: COMMERCIAL

## 2021-08-18 ENCOUNTER — HOSPITAL ENCOUNTER (EMERGENCY)
Age: 21
Discharge: ANOTHER HOSPITAL | End: 2021-08-18
Attending: EMERGENCY MEDICINE
Payer: COMMERCIAL

## 2021-08-18 VITALS
OXYGEN SATURATION: 99 % | WEIGHT: 270 LBS | TEMPERATURE: 98.1 F | RESPIRATION RATE: 16 BRPM | DIASTOLIC BLOOD PRESSURE: 72 MMHG | SYSTOLIC BLOOD PRESSURE: 126 MMHG | BODY MASS INDEX: 43.39 KG/M2 | HEIGHT: 66 IN | HEART RATE: 99 BPM

## 2021-08-18 DIAGNOSIS — R45.851 SUICIDAL IDEATION: Primary | ICD-10-CM

## 2021-08-18 PROBLEM — F31.9 BIPOLAR DEPRESSION (HCC): Status: ACTIVE | Noted: 2021-08-18

## 2021-08-18 LAB
A/G RATIO: 1.2 (ref 1.1–2.2)
ACETAMINOPHEN LEVEL: <5 UG/ML (ref 10–30)
ALBUMIN SERPL-MCNC: 4.2 G/DL (ref 3.4–5)
ALP BLD-CCNC: 60 U/L (ref 40–129)
ALT SERPL-CCNC: 25 U/L (ref 10–40)
AMPHETAMINE SCREEN, URINE: NORMAL
AMYLASE: 41 U/L (ref 25–115)
ANION GAP SERPL CALCULATED.3IONS-SCNC: 8 MMOL/L (ref 3–16)
AST SERPL-CCNC: 20 U/L (ref 15–37)
BARBITURATE SCREEN URINE: NORMAL
BASOPHILS ABSOLUTE: 0.1 K/UL (ref 0–0.2)
BASOPHILS RELATIVE PERCENT: 1 %
BENZODIAZEPINE SCREEN, URINE: NORMAL
BILIRUB SERPL-MCNC: <0.2 MG/DL (ref 0–1)
BILIRUBIN URINE: NEGATIVE
BLOOD, URINE: NEGATIVE
BUN BLDV-MCNC: 9 MG/DL (ref 7–20)
CALCIUM SERPL-MCNC: 9.1 MG/DL (ref 8.3–10.6)
CANNABINOID SCREEN URINE: NORMAL
CHLORIDE BLD-SCNC: 103 MMOL/L (ref 99–110)
CLARITY: CLEAR
CO2: 26 MMOL/L (ref 21–32)
COCAINE METABOLITE SCREEN URINE: NORMAL
COLOR: YELLOW
CREAT SERPL-MCNC: 0.8 MG/DL (ref 0.6–1.1)
EOSINOPHILS ABSOLUTE: 0.3 K/UL (ref 0–0.6)
EOSINOPHILS RELATIVE PERCENT: 3.8 %
ETHANOL: NORMAL MG/DL (ref 0–0.08)
GFR AFRICAN AMERICAN: >60
GFR NON-AFRICAN AMERICAN: >60
GLOBULIN: 3.4 G/DL
GLUCOSE BLD-MCNC: 97 MG/DL (ref 70–99)
GLUCOSE URINE: NEGATIVE MG/DL
HCG(URINE) PREGNANCY TEST: NEGATIVE
HCT VFR BLD CALC: 36.6 % (ref 36–48)
HEMOGLOBIN: 12.1 G/DL (ref 12–16)
KETONES, URINE: ABNORMAL MG/DL
LEUKOCYTE ESTERASE, URINE: NEGATIVE
LIPASE: 28 U/L (ref 13–60)
LYMPHOCYTES ABSOLUTE: 2.2 K/UL (ref 1–5.1)
LYMPHOCYTES RELATIVE PERCENT: 27.5 %
Lab: NORMAL
MCH RBC QN AUTO: 29.3 PG (ref 26–34)
MCHC RBC AUTO-ENTMCNC: 33.1 G/DL (ref 31–36)
MCV RBC AUTO: 88.8 FL (ref 80–100)
METHADONE SCREEN, URINE: NORMAL
MICROSCOPIC EXAMINATION: ABNORMAL
MONOCYTES ABSOLUTE: 0.5 K/UL (ref 0–1.3)
MONOCYTES RELATIVE PERCENT: 6.5 %
NEUTROPHILS ABSOLUTE: 4.8 K/UL (ref 1.7–7.7)
NEUTROPHILS RELATIVE PERCENT: 61.2 %
NITRITE, URINE: NEGATIVE
OPIATE SCREEN URINE: NORMAL
OXYCODONE URINE: NORMAL
PDW BLD-RTO: 14.7 % (ref 12.4–15.4)
PH UA: 6.5
PH UA: 6.5 (ref 5–8)
PHENCYCLIDINE SCREEN URINE: NORMAL
PLATELET # BLD: 380 K/UL (ref 135–450)
PMV BLD AUTO: 7.6 FL (ref 5–10.5)
POTASSIUM REFLEX MAGNESIUM: 4.8 MMOL/L (ref 3.5–5.1)
PRO-BNP: 18 PG/ML (ref 0–124)
PROPOXYPHENE SCREEN: NORMAL
PROTEIN UA: NEGATIVE MG/DL
RBC # BLD: 4.12 M/UL (ref 4–5.2)
SALICYLATE, SERUM: 0.6 MG/DL (ref 15–30)
SARS-COV-2, NAAT: NOT DETECTED
SODIUM BLD-SCNC: 137 MMOL/L (ref 136–145)
SPECIFIC GRAVITY UA: 1.02 (ref 1–1.03)
TOTAL PROTEIN: 7.6 G/DL (ref 6.4–8.2)
TROPONIN: <0.01 NG/ML
URINE TYPE: ABNORMAL
UROBILINOGEN, URINE: 0.2 E.U./DL
WBC # BLD: 7.9 K/UL (ref 4–11)

## 2021-08-18 PROCEDURE — 85025 COMPLETE CBC W/AUTO DIFF WBC: CPT

## 2021-08-18 PROCEDURE — 83690 ASSAY OF LIPASE: CPT

## 2021-08-18 PROCEDURE — 87635 SARS-COV-2 COVID-19 AMP PRB: CPT

## 2021-08-18 PROCEDURE — 87086 URINE CULTURE/COLONY COUNT: CPT

## 2021-08-18 PROCEDURE — 80179 DRUG ASSAY SALICYLATE: CPT

## 2021-08-18 PROCEDURE — 82077 ASSAY SPEC XCP UR&BREATH IA: CPT

## 2021-08-18 PROCEDURE — 99285 EMERGENCY DEPT VISIT HI MDM: CPT

## 2021-08-18 PROCEDURE — 36415 COLL VENOUS BLD VENIPUNCTURE: CPT

## 2021-08-18 PROCEDURE — 80307 DRUG TEST PRSMV CHEM ANLYZR: CPT

## 2021-08-18 PROCEDURE — 1240000000 HC EMOTIONAL WELLNESS R&B

## 2021-08-18 PROCEDURE — 84484 ASSAY OF TROPONIN QUANT: CPT

## 2021-08-18 PROCEDURE — 80053 COMPREHEN METABOLIC PANEL: CPT

## 2021-08-18 PROCEDURE — 81003 URINALYSIS AUTO W/O SCOPE: CPT

## 2021-08-18 PROCEDURE — 83880 ASSAY OF NATRIURETIC PEPTIDE: CPT

## 2021-08-18 PROCEDURE — 84703 CHORIONIC GONADOTROPIN ASSAY: CPT

## 2021-08-18 PROCEDURE — 82150 ASSAY OF AMYLASE: CPT

## 2021-08-18 PROCEDURE — 80143 DRUG ASSAY ACETAMINOPHEN: CPT

## 2021-08-18 ASSESSMENT — PAIN DESCRIPTION - LOCATION: LOCATION: HEAD

## 2021-08-18 ASSESSMENT — PAIN SCALES - GENERAL: PAINLEVEL_OUTOF10: 3

## 2021-08-18 NOTE — LETTER
[facility] Emergency Department      [address] [facility], [address]            PROOF OF PRESENCE      To Whom It May Concern:    *** was present in the Emergency Department at [facility] on ***.                                      Sincerely,        ***

## 2021-08-18 NOTE — ED NOTES
Pt. Placed in bed 6, clothing and belongings to be collected by security, room made safe with all cords and equipment removed, constant  at bedside. Pt. Had expressed to [facility] that she had a knife under her bed and had been thinking about using it to harm herself.      Yari Fernando RN  08/18/21 2698

## 2021-08-18 NOTE — ED PROVIDER NOTES
[address] ENCOUNTER        Pt Name: Josr Chavez  MRN: [id number]  Armstrong[place]itza 2000  Date of evaluation: 8/18/2021  Provider: Melissa Kraft MD  PCP: No primary care provider on file. This patient was seen and evaluated by the attending physician Melissa Kraft MD.      [address] [place] [address]       Chief Complaint   Patient presents with   [address] Problem     Pt. in per [employer] with voluntary assesment for SI, d/c'd from [facility] on the 13 th for attempt. States she tried to OD on her depression meds. HISTORY OF PRESENT ILLNESS   (Location/Symptom, Timing/Onset, Context/Setting, Quality, Duration, Modifying Factors, Severity)  Note limiting factors. Josr Chavez is a 23 y.o. female here today with a chief complaint of feeling suicidal and wanting to overdose on her antidepressants. Was recent admitted at [facility] for similar and was just discharged out 5 days ago on the 13th. Since that time she says she is not doing well. History taking is a bit thin as she answers with one-word sentences. Is still feeling suicidal at this time. Has not taken any medications as of yet. Nursing Notes were all reviewed and agreed with or any disagreements were addressed  in the HPI. REVIEW OF SYSTEMS    (2-9 systems for level 4, 10 or more for level 5)     Review of Systems    Positives and Pertinent negatives as per HPI. Except as noted abovein the ROS, all other systems were reviewed and negative. PAST MEDICAL HISTORY     Past Medical History:   Diagnosis Date    Depression          SURGICAL HISTORY   History reviewed. No pertinent surgical history.       CURRENTMEDICATIONS       Previous Medications    ARIPIPRAZOLE (ABILIFY) 10 MG TABLET    Take 1 tablet by mouth daily    LAMOTRIGINE (LAMICTAL) 25 MG TABLET    Take 2 tablets by mouth daily    NAPROXEN (EC NAPROSYN) 500 MG EC TABLET    Take Obese   Head:  Normocephalic, without obvious abnormality, atraumatic. Eyes:  conjunctiva/corneas clear, EOM's intact. Sclera anicteric. ENT: Mucous membranes moist.   Neck: Supple, symmetrical, trachea midline, no adenopathy. No jugular venous distention. Lungs:   No Respiratory Distress. Chest Wall:  Atraumatic   Heart:  RRR   Abdomen:   Soft, NT, ND   Extremities:  Full range of motion. Pulses: Symmetric x4   Skin:  No rashes or lesions to exposed skin. Neurologic: Alert and oriented X 3. Motor grossly normal.  Speech clear.   +SI         DIAGNOSTIC RESULTS   LABS:    Labs Reviewed   SALICYLATE LEVEL - Abnormal; Notable for the following components:       Result Value    Salicylate, Serum 0.6 (*)     All other components within normal limits    Narrative:     Performed at:  [facility] [address] [facility]   Phone [phone]   ACETAMINOPHEN LEVEL - Abnormal; Notable for the following components:    Acetaminophen Level <5 (*)     All other components within normal limits    Narrative:     Performed at:  [facility] [address] [facility]   Phone [phone]   URINALYSIS - Abnormal; Notable for the following components:    Ketones, Urine TRACE (*)     All other components within normal limits    Narrative:     Performed at:  [facility]  [address] [facility] [place]   Phone [phone], RAPID   CULTURE, URINE   CBC WITH AUTO DIFFERENTIAL    Narrative:     Performed at:  [facility]  [address] [facility]   Phone [phone]   COMPREHENSIVE METABOLIC PANEL W/ REFLEX TO MG FOR LOW K    Narrative:     Performed at:  [facility]  [address] [facility] [address]   Phone [phone]   ETHANOL    Narrative:     Performed at:  [facility] Height: 5' 6\" (1.676 m)       Patient was given thefollowing medications:  Medications - No data to display    24-year-old female feeling suicidal.  No OD/Attempt today  Sending clearance labs. At this juncture is medically cleared for psychiatry. FINAL IMPRESSION      1. Suicidal ideation          DISPOSITION/PLAN   DISPOSITION        PATIENT REFERREDTO:  No follow-up provider specified.     DISCHARGE MEDICATIONS:  New Prescriptions    No medications on file       DISCONTINUED MEDICATIONS:  Discontinued Medications    No medications on file              (Please note that portions ofthis note were completed with a voice recognition program.  Efforts were made to edit the dictations but occasionally words are mis-transcribed.)    Edson Estrada MD (electronically signed)           Edson Estrada MD  08/18/21 5740

## 2021-08-18 NOTE — ED NOTES
Called the transfer center @ [phone] and spoke with Pete Bailey RN for the pt to transfer to a psych facility per Melita Barba MD. Per Dr. Fabi Briseno pt is medically cleared to be transferred for Suicidal Ideations.       [employer]  08/18/21 1529

## 2021-08-18 NOTE — ED NOTES
Steffany from the transfer center called back @ [phone] stating the pt will be going to room [facility] bed 1, N/N [id number], and [facility]  RAGHU aware     Gisel Redd  08/18/21 1528

## 2021-08-18 NOTE — ED NOTES
[facility] called back @ [phone] stating they had a cancellation and could  bed ER 6 @ 1730 to transfer to [facility]. Claudia Mckeon @ [facility] stated will have crew sent to [facility] ER 6.       [employer]  08/18/21 7683

## 2021-08-18 NOTE — ED NOTES
Bed: 06  Expected date:   Expected time:   Means of arrival:   Comments:  ADE Nicolas  08/18/21 1038

## 2021-08-18 NOTE — ED NOTES
[facility] [place] called back @ [phone] stating the pt has gotten accepted by Dr. Troy Aj at [facility] and to fax the pink slip to [facility] @ [phone]. Bryant Ash RN stated she will call back with a room assignment and transport ETA.      [employer]  08/18/21 2981

## 2021-08-19 LAB — URINE CULTURE, ROUTINE: NORMAL

## 2021-08-19 PROCEDURE — 99223 1ST HOSP IP/OBS HIGH 75: CPT | Performed by: PSYCHIATRY & NEUROLOGY

## 2021-08-19 PROCEDURE — 99222 1ST HOSP IP/OBS MODERATE 55: CPT | Performed by: PHYSICIAN ASSISTANT

## 2021-08-19 PROCEDURE — 1240000000 HC EMOTIONAL WELLNESS R&B

## 2021-08-19 PROCEDURE — 6370000000 HC RX 637 (ALT 250 FOR IP): Performed by: PSYCHIATRY & NEUROLOGY

## 2021-08-19 RX ORDER — HYDROXYZINE PAMOATE 50 MG/1
50 CAPSULE ORAL 3 TIMES DAILY PRN
Status: DISCONTINUED | OUTPATIENT
Start: 2021-08-19 | End: 2021-08-23 | Stop reason: HOSPADM

## 2021-08-19 RX ORDER — ARIPIPRAZOLE 10 MG/1
10 TABLET ORAL DAILY
Status: DISCONTINUED | OUTPATIENT
Start: 2021-08-19 | End: 2021-08-23 | Stop reason: HOSPADM

## 2021-08-19 RX ORDER — TRAZODONE HYDROCHLORIDE 50 MG/1
50 TABLET ORAL NIGHTLY PRN
Status: DISCONTINUED | OUTPATIENT
Start: 2021-08-19 | End: 2021-08-23 | Stop reason: HOSPADM

## 2021-08-19 RX ORDER — LAMOTRIGINE 25 MG/1
50 TABLET ORAL DAILY
Status: DISCONTINUED | OUTPATIENT
Start: 2021-08-19 | End: 2021-08-19

## 2021-08-19 RX ORDER — LAMOTRIGINE 25 MG/1
50 TABLET ORAL 2 TIMES DAILY
Status: DISCONTINUED | OUTPATIENT
Start: 2021-08-19 | End: 2021-08-20

## 2021-08-19 RX ORDER — ACETAMINOPHEN 325 MG/1
650 TABLET ORAL EVERY 4 HOURS PRN
Status: DISCONTINUED | OUTPATIENT
Start: 2021-08-19 | End: 2021-08-23 | Stop reason: HOSPADM

## 2021-08-19 RX ADMIN — ACETAMINOPHEN 650 MG: 325 TABLET ORAL at 16:57

## 2021-08-19 RX ADMIN — LAMOTRIGINE 50 MG: 25 TABLET ORAL at 21:19

## 2021-08-19 RX ADMIN — ARIPIPRAZOLE 10 MG: 10 TABLET ORAL at 18:54

## 2021-08-19 ASSESSMENT — PAIN SCALES - GENERAL: PAINLEVEL_OUTOF10: 3

## 2021-08-19 ASSESSMENT — SLEEP AND FATIGUE QUESTIONNAIRES
AVERAGE NUMBER OF SLEEP HOURS: 12
DO YOU HAVE DIFFICULTY SLEEPING: NO
DO YOU USE A SLEEP AID: NO

## 2021-08-19 ASSESSMENT — LIFESTYLE VARIABLES: HISTORY_ALCOHOL_USE: NO

## 2021-08-19 ASSESSMENT — PATIENT HEALTH QUESTIONNAIRE - PHQ9: SUM OF ALL RESPONSES TO PHQ QUESTIONS 1-9: 9

## 2021-08-19 NOTE — BH NOTE
[facility]  Admission Note     Admission Type:   Admission Type: Voluntary    Reason for admission:  Reason for Admission: suicidal ideations with plan to OD on psych medications    PATIENT STRENGTHS:  Strengths: No significant Physical Illness    Patient Strengths and Limitations:  Limitations: Difficulty problem solving/relies on others to help solve problems, Tendency to isolate self, Difficult relationships / poor social skills    Addictive Behavior:   Addictive Behavior  In the past 3 months, have you felt or has someone told you that you have a problem with:  : None  Do you have a history of Chemical Use?: No  Do you have a history of Alcohol Use?: No  Do you have a history of Street Drug Abuse?: No  Histroy of Prescripton Drug Abuse?: No    Medical Problems:   Past Medical History:   Diagnosis Date    Depression        Status EXAM:  Status and Exam  Normal: Yes  Facial Expression: Brightened  Affect: Congruent  Level of Consciousness: Alert  Mood:Normal: Yes  Motor Activity:Normal: Yes  Interview Behavior: Cooperative  Preception: [place] to Person, [place] to Time, [place] to Place, [place]den to Situation  Attention:Normal: Yes  Thought Content:Normal: Yes  Hallucinations: None  Delusions: No  Memory:Normal: Yes  Insight and Judgment: No  Insight and Judgment: Poor Insight, Poor Judgment  Present Suicidal Ideation: No  Present Homicidal Ideation: No    Tobacco Screening:  Practical Counseling, on admission, zana X, if applicable and completed (first 3 are required if patient doesn't refuse):            ( )  Recognizing danger situations (included triggers and roadblocks)                    ( )  Coping skills (new ways to manage stress, exercise, relaxation techniques, changing routine, distraction)                                                           ( )  Basic information about quitting (benefits of quitting, techniques in how to quit, available resources  ( ) Referral for counseling faxed to [facility]                                           ( ) Patient refused counseling  ( ) Patient has not smoked in the last 30 days    Metabolic Screening:    Lab Results   Component Value Date    LABA1C 5.1 08/11/2021       Lab Results   Component Value Date    CHOL 167 08/11/2021     Lab Results   Component Value Date    TRIG 81 08/11/2021     Lab Results   Component Value Date    HDL 45 08/11/2021     No components found for: LDLCAL  Lab Results   Component Value Date    LABVLDL 16 08/11/2021         There is no height or weight on file to calculate BMI. BP Readings from Last 2 Encounters:   08/18/21 126/72   08/13/21 (!) 125/94           Pt admitted with followings belongings:  Dentures: None  Vision - Corrective Lenses: Glasses  Hearing Aid: None  Jewelry: Bracelet  Body Piercings Removed: N/A  Clothing: Footwear, Pants, Shirt, Undergarments (Comment)  Were All Patient Medications Collected?: Not Applicable  Other Valuables: Cell phone, Money (Comment) (cash $45.42)     Patient's home medications were ( no home medications). Patient oriented to surroundings and program expectations and copy of patient rights given. Received admission packet:  yes. Consents reviewed, signed yes. Refused no. Patient verbalize understanding:  yes. Patient education on precautions: yes.                    Byron Hernandez RN

## 2021-08-19 NOTE — H&P
[address]                 [address] [facility] [place], [address]                              HISTORY AND PHYSICAL    PATIENT NAME: Amina Odonnell                :        2000  MED REC NO:   [id number]                          ROOM:       2307  ACCOUNT NO:   [de-identified]                           ADMIT DATE: 2021  PROVIDER:     Lorenza Rivero MD    IDENTIFICATION:  This is a domiciled, never , recently unemployed  47-year-old with a history of mood symptoms and suicidality, who was  brought by squad to [facility] with increasing  thoughts of suicide. SOURCES OF INFORMATION:  The patient. Recent admission. ED note. CHIEF COMPLAINT:  \"I left, I don't think I was ready, I was still having  bad thoughts. \"    HISTORY OF PRESENT ILLNESS:  The patient was discharged from our program  about one week ago. She had outpatient followup at [facility]. She presented to an appointment with them yesterday and told  them she was having increased thoughts of suicide. They sent her by  squad to [facility] ED where she was evaluated and transferred to  us for further evaluation and treatment. The patient reports he continues with symptoms of  depression including low mood, anhedonia, tearfulness, self-reproach,  fatigue, trouble sleeping, and thoughts of suicide. She says she  has thought of suicide intermittently; more frequently the last few days. She was started on combination of Zoloft and Abilify during her last  admission and reports adherence to them so far. In the emergency  department, she endorsed a number of symptoms including mood lability,  impulsive behaviors, atypical hallucinations, and \"being on a roller  coaster. \"    PSYCHIATRIC REVIEW OF SYSTEMS:  As above. STRESSORS:  No new stressors. PAST PSYCHIATRIC HISTORY:  History of suicidal ideation and suicide  attempts.   Only one previous psychiatric hospitalization - here last week. Medication trials include Zoloft. She was placed on Abilify and  Lamictal last week. SUBSTANCE USE HISTORY:  Fourth bottle of vodka nightly until about three  months ago. Now drinks rarely. No other substances. No treatment  programs. MEDICAL HISTORY:  Noncontributory. FAMILY PSYCHIATRIC HISTORY:  Mom, bipolar disorder and substance use. MEDICATIONS:  Lamictal 25 mg twice daily and Abilify 10 mg daily. ALLERGIES:  No known drug allergies. SOCIAL HISTORY:  She lives with her great-grandmother. She is a high  school graduate. She was working for [employer], but recently resigned. She  has never been . She has no kids. LEGAL HISTORY:  None. REVIEW OF SYSTEMS:  She did not describe or endorse recent headaches,  change in vision, chest pain, shortness of breath, cough, sore throat,  fevers, abdominal pain, neurological problems, bleeding problems or skin  problems. She was moving all four extremities and speaking without  difficulty. MENTAL STATUS EXAMINATION:  The patient presented in personal clothes. She spoke freely and had good eye contact. She was socially  appropriate. She described her mood as \"down\" and had a  mood-incongruent affect. She had no psychomotor agitation or  retardation. She spoke softly. She was not pressured. She was oriented to the date,  day, place, and the context of this evaluation. Her memory was intact. Her use of language, speech, and educational attainment suggested an  average level of intellectual functioning. Her thought processes were logical and goal-directed. She did not  describe or endorse delusions or homicidal thinking. She did endorse  some atypical-like and occasional hallucinations, and intermittent  thoughts of suicide. She reported feeling safe here and willing to  approach staff with concerns.     Her ability for abstract thought was fair based on her interpretation of  simple proverbs. Insight and judgment were fair. PHYSICAL EXAMINATION:  VITAL SIGNS:  Temperature 97.5, pulse 96, respiratory rate 16, blood  pressure 140/70. NEUROLOGIC:  Gait normal.    LABORATORY DATA:  Shows a CMP within normal limits. TSH within normal  limits. Ethanol level not detectable. Urine drug screen negative. Acetaminophen and salicylate levels below threshold. CBC within normal  limits. COVID-19 negative. UA clear. Pregnancy test negative. FORMULATION:  This is a domiciled, never , recently unemployed  77-year-old with a history of mood symptoms, who was brought by squad to  [facility] with increasing mood symptoms and  thoughts of suicide. The patient was transferred and admitted for  further evaluation and treatment. Given her symptoms and suicidality,  she requires short inpatient stabilization and treatment. DIAGNOSES:  Mood disorder, unspecified. Differential includes bipolar  depression and borderline personality disorder. PLAN:  1. Admit to Inpatient Psychiatry for short stabilization. 2.  Increase Lamictal to 50 mg twice daily and continue Abilify 10 mg  daily. Ordered every 15-minute checks for safety, programming, and  p.r.n. medication for anxiety, agitation, and insomnia. 3.  Internal medicine consult for admission. 4.  Collateral information for diagnostic clarification and care  coordination. 5.  Estimated length of stay 2-4 days for stabilization. The patient  was admitted on a Statement of Belief, but is willing to stay on a  voluntary basis. A total of 70 minutes was spent with the patient completing this  evaluation and more than 50% of the time was spent completing this  evaluation, providing counseling, and planning treatment with the  patient.         Seirna Jerome MD    D: 08/19/2021 14:57:06       T: 08/19/2021 15:01:44     [email or web address]  Job#: [id number]     Doc#: [id number]    CC:

## 2021-08-19 NOTE — GROUP NOTE
Group Therapy Note    Date: 8/18/2021    Group Start Time: 2000  Group End Time: 2020  Group Topic: [facility]        Group Therapy Note    Attendees: Goals and importance of goal setting discussed. Night time milieu activities discussed. Patient's Goal:  Get here    Notes:  Successful     Status After Intervention:  Improved    Participation Level:  Active Listener and Interactive    Participation Quality: Appropriate and Attentive      Speech:  normal      Thought Process/Content: Logical  Linear      Affective Functioning: Congruent      Mood: euphoric      Level of consciousness:  Alert and Oriented x4      Response to Learning: Progressing to goal      Endings: None Reported    Modes of Intervention: Support      Discipline Responsible: Behavorial Health Tech      Signature:  Nely Shaw

## 2021-08-19 NOTE — PLAN OF CARE
Problem: Altered Mood, Depressive Behavior:  Goal: Able to verbalize acceptance of life and situations over which he or she has no control  Description: Able to verbalize acceptance of life and situations over which he or she has no control  Outcome: Ongoing     Problem: Altered Mood, Depressive Behavior:  Goal: Able to verbalize and/or display a decrease in depressive symptoms  Description: Able to verbalize and/or display a decrease in depressive symptoms  Outcome: Ongoing  Note: Brightens during interaction and is social with peers. Has attended groups. Uses a very soft tone voice when interacting with staff.      Problem: Altered Mood, Depressive Behavior:  Goal: Ability to disclose and discuss suicidal ideas will improve  Description: Ability to disclose and discuss suicidal ideas will improve  Outcome: Met This Shift     Problem: Altered Mood, Depressive Behavior:  Goal: Able to verbalize support systems  Description: Able to verbalize support systems  Outcome: Met This Shift

## 2021-08-19 NOTE — BH NOTE
Mell Ashby arrived per medical transport from [facility]. She was cooperative with vital sign assessment and orientation to room and unit on arrival.  Mell Ashby declined a meal stating that she had already eaten dinner. She is currently sitting in the Wrap up group.

## 2021-08-19 NOTE — FLOWSHEET NOTE
08/19/21 0857   Suicidal Ideation   Wish to be Dead Past 1 month - Yes;Lifetime - Yes   Non-Specific Active Suicidal Thoughts Lifetime - Yes;Past 1 month - No   Suicidal Behavior Trigger Wish to be Dead   Active Suicidal Ideation with Any Methods (Not Plan) without Intent to Act Lifetime - Yes   Active Suicidal Ideation with Some Intent to Act, without Specific Plan Past 1 month - Yes;Lifetime - Yes   Active Suicidal Ideation with Specific Plan and Intent Lifetime - Yes;Past 1 month - Yes   Intensity of Ideation   Lifetime - Most Severe Ideation 5 - most severe   Lifetime - Most Recent Ideation 3   Frequency 2-5 times in a week   Duration Less than 1 hour/some of the time   Controllability Can control thoughts with some difficulty   Deterrents Deterrents probably stopped you   Reasons for Ideation Mostly to end or stop the pain   Suicidal Behavior   Actual Attempt Lifetime - Yes;Past 3 months - Yes   Total # of Attempts 11   Has subject engaged in Non-Suicidal Self-Injurious Behavior? Lifetime - No;Past 3 months - No   Interrupted Attempt Lifetime - No;Past 3 months - No   Aborted or Self-Interrupted Attempt Lifetime - No;Past 3 months - No   Preparatory Acts or Behavior Lifetime - No   Actual Lethality/Medical Damage 0   Potential Lethality 0   Most Recent Attempt Date 08/09/21   Initial/First Attempt Date   (6years old)       Therapist met with pt to complete CSSRS. Pt reported chronic SI, \"always with a plan\" however pt fears suffering/pain d/t not doing it correctly.     REJI Lacey, Holli  [place] [address], LSW

## 2021-08-19 NOTE — PROGRESS NOTES
Behavioral Services  Medicare Certification Upon Admission    I certify that this patient's inpatient psychiatric hospital admission is medically necessary for:    [x] (1) Treatment which could reasonably be expected to improve this patient's condition,       [x] (2) Or for diagnostic study;     AND     [x](2) The inpatient psychiatric services are provided while the individual is under the care of a physician and are included in the individualized plan of care.     Estimated length of stay/service 2-4 days    Plan for post-hospital care incomplete     Electronically signed by Cheryl Ruelas MD on 8/19/2021 at 2:57 PM DISPLAY PLAN FREE TEXT

## 2021-08-19 NOTE — H&P
Hospital Medicine History & Physical      PCP: No primary care provider on file. Date of Admission: 8/18/2021    Date of Service: Pt seen/examined on 8/19/2021    Chief Complaint:  No chief complaint on file. History Of Present Illness: The patient is a 23 y.o. female with a PMH of Asthma and HLD who presented to [facility] for suicidal ideation. Patient was seen and evaluated in the ED by the ED medical provider, patient was medically cleared for admission to [facility] at [facility]. This note serves as an admission medical H&P.   PCP: No primary care provider on file. Tobacco Use: none  EtOH Use: weekly  Illicit Drug Use: none, UDS negative    Pt denies any medical concerns at this time. Past Medical History:        Diagnosis Date    Depression        Past Surgical History:    History reviewed. No pertinent surgical history. Medications Prior to Admission:    Prior to Admission medications    Medication Sig Start Date End Date Taking? Authorizing Provider   lamoTRIgine (LAMICTAL) 25 MG tablet Take 2 tablets by mouth daily 8/14/21   Camille Davila MD   ARIPiprazole (ABILIFY) 10 MG tablet Take 1 tablet by mouth daily 8/14/21   Camille Davila MD   naproxen (EC NAPROSYN) 500 MG EC tablet Take 500 mg by mouth 2 times daily (with meals)    Historical Provider, MD       Allergies:  Coconut fatty acids, Macadamia nut oil, Nutritional supplements, and Pineapple extract    Social History:  The patient currently lives with family. TOBACCO:   reports that she has never smoked. She has never used smokeless tobacco.  ETOH:   reports no history of alcohol use.       Family History:   Positive as follows:        Problem Relation Age of Onset    Mental Illness Mother     No Known Problems Sister        REVIEW OF SYSTEMS:    Constitutional: Negative for fever   HENT: Negative for sore throat   Eyes: Negative for redness   Respiratory: Negative  for dyspnea, cough   Cardiovascular: Negative for chest pain   Gastrointestinal: Negative for vomiting, diarrhea   Genitourinary: Negative for hematuria   Musculoskeletal: Negative for arthralgias   Skin: Negative for rash   Neurological: Negative for syncope   Hematological: Negative for adenopathy   Psychiatric/Behavorial: Negative for anxiety    PHYSICAL EXAM:    BP (!) 140/70   Pulse 96   Temp 97.5 °F (36.4 °C) (Temporal)   Resp 16   SpO2 97%   Gen: No distress. Alert. [facility]  female, obese  Eyes: PERRL. No sclera icterus. No conjunctival injection. glasses  ENT: No discharge. Pharynx clear. Neck:  Trachea midline. Resp: No accessory muscle use. No crackles. No wheezes. No rhonchi. CV: Regular rate. Regular rhythm. No murmur. No rub. No edema. GI: Soft, Non-tender. Non-distended. Skin: Warm and dry. No rash on exposed extremities. M/S: Ambulates independently  Neuro: Awake. Grossly nonfocal, CN II-XII intact, moves all extremities, follows commands, no dysarthria    Psych: Defer to psychiatry.     CBC:   Recent Labs     08/18/21  1057   WBC 7.9   HGB 12.1   HCT 36.6   MCV 88.8        BMP:   Recent Labs     08/18/21  1057      K 4.8      CO2 26   BUN 9   CREATININE 0.8     LIVER PROFILE:   Recent Labs     08/18/21  1057   AST 20   ALT 25   LIPASE 28.0   BILITOT <0.2   ALKPHOS 60     UA:  Recent Labs     08/18/21  1104   COLORU YELLOW   PHUR 6.5  6.5   CLARITYU Clear   SPECGRAV 1.019   LEUKOCYTESUR Negative   UROBILINOGEN 0.2   BILIRUBINUR Negative   BLOODU Negative   GLUCOSEU Negative        CARDIAC ENZYMES  Recent Labs     08/18/21  1057   TROPONINI <0.01       U/A:    Lab Results   Component Value Date    COLORU YELLOW 08/18/2021    WBCUA 9 08/09/2021    RBCUA 2 08/09/2021    BACTERIA 2+ 08/09/2021    CLARITYU Clear 08/18/2021    SPECGRAV 1.019 08/18/2021    LEUKOCYTESUR Negative 08/18/2021    BLOODU Negative 08/18/2021    GLUCOSEU Negative 08/18/2021     CULTURES  None    EKG: None    RADIOLOGY  None    ASSESSMENT/PLAN:    Bipolar Depression  - cont mgmt per [facility]    Elevated Blood Pressure  - monitor  - may need f/u OP, no initiation of medications at this time    HLD  - lifestyle mgmt    Asthma  - mild, intermittent  - no AE  - uses an inhaler ~1x/year when she gets sick    Morbid Obesity  - There is no height or weight on file to calculate BMI. - Complicating assessment and treatment. Placing patient at risk for multiple co-morbidities as well as early death and contributing to the patient's presentation.   - Counseled on weight loss. Pt has no medical complaints at this time. Pt was informed that they may have [facility] contact us should any medical concerns arise during this admission.     Chris Hollins PA-C 1:59 PM 8/19/2021

## 2021-08-19 NOTE — FLOWSHEET NOTE
Group Therapy Note    Date: 8/19/2021  Start Time: 1300  End Time:  1400  Number of Participants: 10    Type of Group: Music Group    Notes:  Pt present for Music Group. Pt actively participated by making song selections and singing along to music. Participation Level:  Active Listener and Interactive    Participation Quality: Appropriate and Attentive      Speech:  normal      Affective Functioning: Congruent      Endings: None Reported    Modes of Intervention: Support, Socialization, Activity and Media      Discipline Responsible: Chaplain Ananya Amaya       08/19/21 1810   Encounter Summary   Services provided to: Patient   Continue Visiting   (8/19 Music Group)   Complexity of Encounter Moderate   Length of Encounter 1 hour

## 2021-08-20 PROCEDURE — 1240000000 HC EMOTIONAL WELLNESS R&B

## 2021-08-20 PROCEDURE — 99233 SBSQ HOSP IP/OBS HIGH 50: CPT | Performed by: PSYCHIATRY & NEUROLOGY

## 2021-08-20 PROCEDURE — 6370000000 HC RX 637 (ALT 250 FOR IP): Performed by: PSYCHIATRY & NEUROLOGY

## 2021-08-20 RX ORDER — LAMOTRIGINE 25 MG/1
75 TABLET ORAL 2 TIMES DAILY
Status: DISCONTINUED | OUTPATIENT
Start: 2021-08-21 | End: 2021-08-23

## 2021-08-20 RX ORDER — LAMOTRIGINE 25 MG/1
50 TABLET ORAL 2 TIMES DAILY
Status: COMPLETED | OUTPATIENT
Start: 2021-08-20 | End: 2021-08-20

## 2021-08-20 RX ADMIN — LAMOTRIGINE 50 MG: 25 TABLET ORAL at 08:31

## 2021-08-20 RX ADMIN — ARIPIPRAZOLE 10 MG: 10 TABLET ORAL at 08:31

## 2021-08-20 RX ADMIN — LAMOTRIGINE 50 MG: 25 TABLET ORAL at 21:24

## 2021-08-20 NOTE — PLAN OF CARE
Pt A&O, visible on unit eating in dinning room, social with peers. Pt denies SI,HI,AVH, no distress noted at this time, will continue to monitor.

## 2021-08-20 NOTE — FLOWSHEET NOTE
Purposeful Rounding    Patient Location: Patient room    Patient willing to engage in conversation: No    Presentation/behavior:  Patient resting quietly in bed with eyes closed. Affect:  Patient resting quietly in bed with eyes closed.     Concerns reported: None    PRN medications given: No    Environmental assessment: No safety hazards noted    Fall prevention interventions in place: Yellow non-skid socks on    Daily [place] Fall Risk Score: 55    Daily Choi Fall Risk Score: Low risk

## 2021-08-20 NOTE — GROUP NOTE
Group Therapy Note    Date: 8/20/2021    Group Start Time: 1000  Group End Time: 1050  Group Topic: Psychotherapy    [facility], REJI, LICDC, LSW      Group Therapy Note    Attendees: 8         Patient's Goal:  Explore/process stressors/relationship dynamics and lean/discuss healthy coping/communicatio strategies. Notes:  Pt minimally participated in group discussion. Status After Intervention:  Unchanged    Participation Level:  Active Listener and Minimal    Participation Quality: Appropriate and Attentive      Speech:  normal      Thought Process/Content: Linear      Affective Functioning: Congruent      Mood: dysphoric      Level of consciousness:  Alert, Oriented x4 and Attentive      Response to Learning: Able to verbalize current knowledge/experience and Progressing to goal      Endings: None Reported    Modes of Intervention: Education, Support, Socialization, Exploration, Clarifying, Problem-solving, Confrontation, Limit-setting and Reality-testing      Discipline Responsible: /Counselor      Signature:  REJI [address]

## 2021-08-20 NOTE — GROUP NOTE
Group Therapy Note    Date: 8/19/2021    Group Start Time: 2020  Group End Time: 2110  Group Topic: Wrap-Up    [facility]        Group Therapy Note    Attendees: goals and importance of goal setting discussed. Night time milieu activities discussed. Patient's Goal:  See dr, go to groups    Notes:  Successful     Status After Intervention:  Improved    Participation Level:  Active Listener and Interactive    Participation Quality: Appropriate and Attentive      Speech:  normal      Thought Process/Content: Logical  Linear      Affective Functioning: Congruent      Mood: euthymic      Level of consciousness:  Alert and Oriented x4      Response to Learning: Progressing to goal      Endings: None Reported    Modes of Intervention: Support      Discipline Responsible: Behavorial Health Tech      Signature:  Joyce Dorado

## 2021-08-20 NOTE — PLAN OF CARE
Patient alert and oriented x 3. Patient rates Depression 2/10 and Anxiety 0/10. Patient visible and social with peers. Patient denies SI/HI/A/V/H. Patient attended and participated in wrap up group. Patient took HS medications. Patient denies self harm. No c/o's voiced at present.

## 2021-08-20 NOTE — PROGRESS NOTES
Department of Psychiatry  Progress Note    Patient's chart was reviewed. Discussed with treatment team. Met with patient. SUBJECTIVE:      Reports feeling better since admission and resuming treatment. Less SI. More future oriented. Tolerating increased dose of Lamictal.    Says she thought she saw bugs (hallucinations) on the shelf this morning. No other psychotic symptoms. ROS:   Patient has new complaints: no  Sleeping adequately:  Yes   Appetite adequate: Yes  Engaged in programming: Yes    OBJECTIVE:  VITALS:  /65   Pulse 92   Temp 97.3 °F (36.3 °C) (Temporal)   Resp 16   SpO2 95%     Mental Status Examination:    Appearance: fair grooming and hygiene  Behavior/Attitude toward examiner:  cooperative, attentive and fair eye contact  Speech: Normal rate, volume, amount  Mood:  \"better\"  Affect:  blunted     Thought processes:  Goal directed, linear, no JARON or gross disorganization  Thought Content: less SI, no HI, no delusions voiced, no obsessions  Perceptions: no AVH  Attention: attention span and concentration were intact to interview   Abstraction: intact  Cognition:  Alert and oriented to person, place, time, and situation, recall intact  Insight: fair   Judgment: fair    Medication:  Scheduled:   ARIPiprazole  10 mg Oral Daily    lamoTRIgine  50 mg Oral BID        PRN:  acetaminophen, hydrOXYzine, traZODone     FORMULATION:  This is a domiciled, never , recently unemployed  23-year-old with a history of mood symptoms, who was brought by squad to  [facility] with increasing mood symptoms and  thoughts of suicide. The patient was transferred and admitted for  further evaluation and treatment. Given her symptoms and suicidality,  she requires short inpatient stabilization and treatment.      Mood disorder, unspecified. Differential includes bipolar  depression and borderline personality disorder.     Principal Problem:    Bipolar depression ([facility] [address]  Active Problems:    Elevated BP without diagnosis of hypertension    Hyperlipidemia    Mild intermittent asthma without complication    Morbid obesity ([facility] [address]  Resolved Problems:    * No resolved hospital problems. *     PLAN:  1. Admitted to Inpatient Psychiatry for short stabilization. 2.  On admission, increased Lamictal to 50 mg twice daily and continued Abilify 10 mg daily. Ordered every 15-minute checks for safety, programming, and  p.r.n. medication for anxiety, agitation, and insomnia. 8/20/2021 - increase Lamictal to 75mg BID tomorrow. Order placed. 3.  Internal medicine consult for admission. Elevated Blood Pressure  - monitor  - may need f/u OP, no initiation of medications at this time     HLD  - lifestyle mgmt     Asthma  - mild, intermittent  - no AE  - uses an inhaler ~1x/year when she gets sick    4. Estimated length of stay 2-4 days for stabilization. The patient  was admitted on a Statement of Belief, but is willing to stay on a  voluntary basis. [employer] DC Monday. Total time with patient was 35 minutes and more than 50 % of that time was spent counseling the patient on their symptoms, treatment, and expected goals.      Suleiman Peguero MD

## 2021-08-20 NOTE — FLOWSHEET NOTE
Purposeful Rounding    Patient Location: [facility]    Patient willing to engage in conversation: Yes    Presentation/behavior: Cooperative and Pleasant    Affect: Brightens with interaction    Concerns reported: None    PRN medications given: No    Environmental assessment: No safety hazards noted    Fall prevention interventions in place: Yellow non-skid socks on    Daily [place] Fall Risk Score:  55    Daily Choi Fall Risk Score: Low risk

## 2021-08-21 PROCEDURE — 1240000000 HC EMOTIONAL WELLNESS R&B

## 2021-08-21 PROCEDURE — 99232 SBSQ HOSP IP/OBS MODERATE 35: CPT | Performed by: PSYCHIATRY & NEUROLOGY

## 2021-08-21 PROCEDURE — 6370000000 HC RX 637 (ALT 250 FOR IP): Performed by: PSYCHIATRY & NEUROLOGY

## 2021-08-21 RX ADMIN — LAMOTRIGINE 75 MG: 25 TABLET ORAL at 21:15

## 2021-08-21 RX ADMIN — HYDROXYZINE PAMOATE 50 MG: 50 CAPSULE ORAL at 21:15

## 2021-08-21 RX ADMIN — LAMOTRIGINE 75 MG: 25 TABLET ORAL at 08:36

## 2021-08-21 RX ADMIN — ARIPIPRAZOLE 10 MG: 10 TABLET ORAL at 08:36

## 2021-08-21 NOTE — FLOWSHEET NOTE
Purposeful Rounding    Patient Location: [facility]    Patient willing to engage in conversation: Yes    Presentation/behavior: Cooperative and Pleasant    Affect: Brightens with interaction    Concerns reported: None    PRN medications given: No    Environmental assessment: No safety hazards noted    Fall prevention interventions in place: Yellow non-skid socks on    Daily [place] Fall Risk Score: 45    Daily Choi Fall Risk Score: Low risk

## 2021-08-21 NOTE — PLAN OF CARE
Patient alert and oriented x 3. Patient rates Depression 2/10 and Anxiety 0/10. Patient visible and social on the milieu. Patient denies SI/HI/A/V/H. Patient took HS medication. Patient denies self harm. No c/o's voiced at present.

## 2021-08-21 NOTE — BH NOTE
Time: 2100      Type of Group: Wrap up group      Level of Participation: Full participation      Comments: Daily goal met

## 2021-08-21 NOTE — FLOWSHEET NOTE
Purposeful Rounding    Patient Location: Patient room    Patient willing to engage in conversation: No    Presentation/behavior: Other sleeping*    Affect: Neutral/Euthymic(normal)    Concerns reported: yes    PRN medications given: no    Environmental assessment: No safety hazards noted    Daily [place] Fall Risk Score: 45    Daily Choi Fall Risk Score: 0

## 2021-08-21 NOTE — CARE COORDINATION
[facility]  Treatment Team Note  Review Date & Time:        Patient was not in treatment team      Status EXAM:   Status and Exam  Normal: Yes  Facial Expression: Brightened  Affect: Appropriate  Level of Consciousness: Alert  Mood:Normal: Yes  Mood: Depressed  Motor Activity:Normal: Yes  Interview Behavior: Cooperative  Preception: [place] to Person, [place] to Time, [place] to Place, [place] to Situation  Attention:Normal: Yes  Thought Processes: Circumstantial  Thought Content:Normal: Yes  Thought Content: Poverty of Content  Hallucinations: None  Delusions: No  Memory:Normal: Yes  Insight and Judgment: No  Insight and Judgment: Poor Judgment  Present Suicidal Ideation: No  Present Homicidal Ideation: No      Suicide Risk CSSR-S:  1) Within the past month, have you wished you were dead or wished you could go to sleep and not wake up? : Yes  2) Have you actually had any thoughts of killing yourself? : Yes  3) Have you been thinking about how you might kill yourself? : Yes  5) Have you started to work out or worked out the details of how to kill yourself? Do you intend to carry out this plan? : No  6) Have you ever done anything, started to do anything, or prepared to do anything to end your life?: Yes      PLAN/TREATMENT RECOMMENDATIONS UPDATE: Patient will take medication as prescribed, eat 75% of meals, attend groups, participate in milieu activities, participate in treatment team and care planning for discharge and follow up.             Irina Trejo, RN

## 2021-08-21 NOTE — FLOWSHEET NOTE
Purposeful Rounding    Patient Location: Day room    Patient willing to engage in conversation: Yes    Presentation/behavior: Anxious and Pleasant    Affect: Brightens with interaction    Concerns reported: yes    PRN medications given: no    Environmental assessment: No safety hazards noted    Daily [place] Fall Risk Score: 45    Daily Choi Fall Risk Score: 0

## 2021-08-21 NOTE — GROUP NOTE
Group Therapy Note    Date: 8/21/2021    Group Start Time: 0100  Group End Time: 0200  Group Topic: Cognitive Skills    [id number] [facility] [place]        Group Therapy Note    Attendees: 10    Patient's Goal:  Patient will increase understanding of setting healthy boundaries    Notes:  Patients were provided with handouts about codependency and setting healthy boundaries. Marcos Morales participated in the group discussion. Status After Intervention:  Improved    Participation Level:  Active Listener    Participation Quality: Appropriate and Attentive      Speech:  normal      Thought Process/Content: Logical      Affective Functioning: Congruent      Mood: anxious      Level of consciousness:  Alert, Oriented x4 and Attentive      Response to Learning: Able to verbalize current knowledge/experience and Progressing to goal      Endings: None Reported    Modes of Intervention: Education      Discipline Responsible: /Counselor      Signature:  SARAH Gomez

## 2021-08-21 NOTE — PROGRESS NOTES
Department of Psychiatry  Progress Note    Patient's chart was reviewed. Discussed with treatment team. Met with patient. SUBJECTIVE:      Pt reports that she is feeling better overall, but tired today. Stated she was restless last night and woke up several times. Patient is unconcerned about this, however, as she works hard shift normally and sleeps during the day typically when at home. She currently denies suicidal ideation and states that group activities have been very helpful to her. \"I think I was just having a bad day and may have overreacted. \" She reports that she is tolerating her medications. ROS:   Patient has new complaints: no  Sleeping adequately:  Yes   Appetite adequate: Yes  Engaged in programming: Yes    OBJECTIVE:  VITALS:  /80   Pulse 97   Temp 97.3 °F (36.3 °C) (Temporal)   Resp 17   Ht 5' 5\" (1.651 m)   Wt 270 lb (122.5 kg)   SpO2 96%   BMI 44.93 kg/m²     Mental Status Examination:    Appearance: fair grooming and hygiene  Behavior/Attitude toward examiner:  cooperative, attentive and fair eye contact  Speech: Normal rate, volume, amount  Mood:  \"I think I was just having a bad day\"  Affect:  constricted  Thought processes:  Goal directed, linear, no JARON or gross disorganization  Thought Content: today denies SI, no HI, no delusions voiced, no obsessions  Perceptions: no AVH  Attention: attention span and concentration were intact to interview   Abstraction: intact  Cognition:  Alert and oriented to person, place, time, and situation, recall intact  Insight: fair   Judgment: fair    Medication:  Scheduled:   lamoTRIgine  75 mg Oral BID    ARIPiprazole  10 mg Oral Daily        PRN:  acetaminophen, hydrOXYzine, traZODone     FORMULATION:  This is a domiciled, never , recently unemployed  67-year-old with a history of mood symptoms, who was brought by squad to  [facility] with increasing mood symptoms and  thoughts of suicide.   The patient was transferred and admitted for  further evaluation and treatment. Given her symptoms and suicidality,  she requires short inpatient stabilization and treatment.      Mood disorder, unspecified. Differential includes bipolar  depression and borderline personality disorder. Principal Problem:    Bipolar depression ([facility] [address]  Active Problems:    Elevated BP without diagnosis of hypertension    Hyperlipidemia    Mild intermittent asthma without complication    Morbid obesity ([facility] [address]  Resolved Problems:    * No resolved hospital problems. *     PLAN:  1. Admitted to Inpatient Psychiatry for short stabilization. 2.  On admission, increased Lamictal to 50 mg twice daily and continued Abilify 10 mg daily. Ordered every 15-minute checks for safety, programming, and  p.r.n. medication for anxiety, agitation, and insomnia. 8/20/2021 - increase Lamictal to 75mg BID tomorrow. Order placed.  -continue above    3. Internal medicine consult for admission. Elevated Blood Pressure  - monitor  - may need f/u OP, no initiation of medications at this time     HLD  - lifestyle mgmt     Asthma  - mild, intermittent  - no AE  - uses an inhaler ~1x/year when she gets sick    4. Estimated length of stay 2-4 days for stabilization. The patient  was admitted on a Statement of Belief, but is willing to stay on a  voluntary basis. [employer] DC Monday. Total time with patient was 25 minutes and more than 50 % of that time was spent counseling the patient on their symptoms, treatment, and expected goals.      John Oates MD  Staff Psychiatrist

## 2021-08-21 NOTE — FLOWSHEET NOTE
Purposeful Rounding    Patient Location: Patient room    Patient willing to engage in conversation: No    Presentation/behavior: Patient resting quietly in bed with eyes closed. Affect:  Patient resting quietly in bed with eyes closed.     Concerns reported: None    PRN medications given: No    Environmental assessment: No safety hazards noted    Fall prevention interventions in place: Yellow non-skid socks on    Daily [place] Fall Risk Score: 45    Daily Choi Fall Risk Score: Low risk

## 2021-08-21 NOTE — PLAN OF CARE
Problem: Altered Mood, Depressive Behavior:  Goal: Able to verbalize and/or display a decrease in depressive symptoms  Description: Able to verbalize and/or display a decrease in depressive symptoms  Outcome: Ongoing  Bright, pleasant & interacting

## 2021-08-22 PROCEDURE — 6370000000 HC RX 637 (ALT 250 FOR IP): Performed by: PSYCHIATRY & NEUROLOGY

## 2021-08-22 PROCEDURE — 1240000000 HC EMOTIONAL WELLNESS R&B

## 2021-08-22 RX ADMIN — ARIPIPRAZOLE 10 MG: 10 TABLET ORAL at 08:47

## 2021-08-22 RX ADMIN — TRAZODONE HYDROCHLORIDE 50 MG: 50 TABLET ORAL at 21:07

## 2021-08-22 RX ADMIN — LAMOTRIGINE 75 MG: 25 TABLET ORAL at 08:47

## 2021-08-22 RX ADMIN — LAMOTRIGINE 75 MG: 25 TABLET ORAL at 21:07

## 2021-08-22 NOTE — FLOWSHEET NOTE
START TIME: 7371-701    ATTENDED: Yes    GROUP TOPIC: ORIENTATION TO MILIEU ACTIVITIES    MENU FILL OUT: No

## 2021-08-22 NOTE — PROGRESS NOTES
Purposeful Rounding    Patient Location: Patient room    Patient willing to engage in conversation: Yes    Presentation/behavior: Cooperative and Pleasant    Affect: Neutral/Euthymic(normal)    Concerns reported: No concerns reported at this time.     PRN medications given: Vistaril given per order    Environmental assessment: Room free from clutter, Clear path to bathroom , Adequate lighting and No safety hazards noted    Fall prevention interventions in place: Yellow non-skid socks on    Daily [place] Fall Risk Score: 53    Daily Choi Fall Risk Score: 15

## 2021-08-22 NOTE — FLOWSHEET NOTE
Comments: + interactions, + contribution, and + engagement.         Time: 0949-3055      Type of Group: Wrap-up/Relaxation      Level of Participation: 13/15

## 2021-08-23 VITALS
TEMPERATURE: 98 F | BODY MASS INDEX: 44.98 KG/M2 | RESPIRATION RATE: 16 BRPM | DIASTOLIC BLOOD PRESSURE: 76 MMHG | HEIGHT: 65 IN | OXYGEN SATURATION: 98 % | SYSTOLIC BLOOD PRESSURE: 120 MMHG | HEART RATE: 109 BPM | WEIGHT: 270 LBS

## 2021-08-23 PROCEDURE — 5130000000 HC BRIDGE APPOINTMENT

## 2021-08-23 PROCEDURE — 99239 HOSP IP/OBS DSCHRG MGMT >30: CPT | Performed by: PSYCHIATRY & NEUROLOGY

## 2021-08-23 PROCEDURE — 6370000000 HC RX 637 (ALT 250 FOR IP): Performed by: PSYCHIATRY & NEUROLOGY

## 2021-08-23 RX ORDER — LAMOTRIGINE 100 MG/1
200 TABLET ORAL DAILY
Status: DISCONTINUED | OUTPATIENT
Start: 2021-08-24 | End: 2021-08-23 | Stop reason: HOSPADM

## 2021-08-23 RX ORDER — HYDROXYZINE PAMOATE 25 MG/1
25 CAPSULE ORAL DAILY PRN
Qty: 14 CAPSULE | Refills: 0 | Status: ON HOLD | OUTPATIENT
Start: 2021-08-23 | End: 2022-10-13

## 2021-08-23 RX ORDER — LAMOTRIGINE 200 MG/1
200 TABLET ORAL DAILY
Qty: 30 TABLET | Refills: 0 | Status: ON HOLD | OUTPATIENT
Start: 2021-08-24 | End: 2022-10-13

## 2021-08-23 RX ORDER — LAMOTRIGINE 25 MG/1
75 TABLET ORAL ONCE
Status: COMPLETED | OUTPATIENT
Start: 2021-08-23 | End: 2021-08-23

## 2021-08-23 RX ORDER — ARIPIPRAZOLE 10 MG/1
10 TABLET ORAL DAILY
Qty: 30 TABLET | Refills: 0 | Status: ON HOLD | OUTPATIENT
Start: 2021-08-23 | End: 2022-10-13

## 2021-08-23 RX ADMIN — LAMOTRIGINE 75 MG: 25 TABLET ORAL at 10:56

## 2021-08-23 RX ADMIN — LAMOTRIGINE 75 MG: 25 TABLET ORAL at 08:27

## 2021-08-23 RX ADMIN — ARIPIPRAZOLE 10 MG: 10 TABLET ORAL at 08:27

## 2021-08-23 NOTE — SUICIDE SAFETY PLAN
SAFETY PLAN    A suicide Safety Plan is a document that supports someone when they are having thoughts of suicide. Warning Signs that indicate a suicidal crisis may be developing: What (situations, thoughts, feelings, body sensations, behaviors, etc.) do you experience that lets you know you are beginning to think about suicide? 1. crying  2. isolating  3. \"heart warming visuals\"    Internal Coping Strategies:  What things can I do (relaxation techniques, hobbies, physical activities, etc.) to take my mind off my problems without contacting another person? 1. Walk away  2. Talk to someone  3. nap    People and social settings that provide distraction: Who can I call or where can I go to distract me? 1. Name: Allan Sam  Phone: [phone]  2. Name: Inocencio Short  Phone: [phone]   3. Place: Living Room              People whom I can ask for help: Who can I call when I need help - for example, friends, family, clergy, someone else? 1. Name: \"see above\"                  2. Name: My therapist      Professionals or [address] [place] [address] agencies I can contact during a crisis: Who can I call for help - for example, my doctor, my psychiatrist, my psychologist, a mental health provider, a suicide hotline? 1. Clinician Name: [address] [place] [address]   Address: [address] [place], [facility]      Phone  #: [phone]    2. Suicide Prevention Lifeline: 6-029-670-TALK (2556)      Making the environment safe: How can I make my environment (house/apartment/living space) safer? For example, can I remove guns, medications, and other items? 1. Remove knives  2.  Clean my room    Something that is important to me and worth living for is: my cat Richar Padilla and my grandma

## 2021-08-23 NOTE — BH NOTE
Purposeful Rounding    Patient Location: Day room    Patient willing to engage in conversation: Yes    Presentation/behavior: Cooperative and Pleasant    Affect: Brightens with interaction    Concerns reported: none at this time    PRN medications given: none at this time    Environmental assessment: Room free from clutter, Clear path to bathroom , Adequate lighting and No safety hazards noted    Fall prevention interventions in place: none required    Daily [place] Fall Risk Score: 65    Daily Choi Fall Risk Score: low

## 2021-08-23 NOTE — BH NOTE
[facility]  Discharge Note    Pt discharged with followings belongings:   Dentures: None  Vision - Corrective Lenses: Glasses  Hearing Aid: None  Jewelry: Bracelet  Body Piercings Removed: N/A  Clothing: Footwear, Pants, Undergarments (Comment), Shirt, Other (Comment)  Were All Patient Medications Collected?: Not Applicable  Other Valuables: Cell phone, Money (Comment), Other (Comment) ($45.42)   Valuables sent home with Sharon Danielle or returned to patient. Patient education on aftercare instructions: yes  Information faxed to [facility] by Gustav Bumpers  at 4:17 PM .Patient verbalize understanding of AVS:  yes. Status EXAM upon discharge:  Status and Exam  Normal: No  Facial Expression: Flat (brightens with interaction)  Affect: Congruent  Level of Consciousness: Alert  Mood:Normal: No  Mood: Depressed  Motor Activity:Normal: Yes  Motor Activity: Other(See Comments) (normal)  Interview Behavior: Cooperative  Preception: Hostetter to Person, Isidor Babinski to Time, Hostetter to Place, Hostetter to Situation  Attention:Normal: Yes  Attention: Distractible  Thought Processes: Circumstantial  Thought Content:Normal: No  Thought Content: Poverty of Content (reports feeling better. social with select peers)  Hallucinations: None  Delusions: No  Memory:Normal: Yes  Memory: Poor Recent  Insight and Judgment: No  Insight and Judgment: Poor Judgment, Poor Insight  Present Suicidal Ideation: No  Present Homicidal Ideation: No      Metabolic Screening:    Lab Results   Component Value Date    LABA1C 5.1 08/11/2021       Lab Results   Component Value Date    CHOL 167 08/11/2021     Lab Results   Component Value Date    TRIG 81 08/11/2021     Lab Results   Component Value Date    HDL 45 08/11/2021     No components found for: [facility]  Lab Results   Component Value Date    LABVLDL 16 08/11/2021       Bridge Appointment completed: Reviewed Discharge Instructions with patient.     Patient verbalizes understanding and agreement with the discharge plan using the teachback method.      Referral for Outpatient Tobacco Cessation Counseling, upon discharge (zana X if applicable and completed):    ( )  Hospital staff assisted patient to call Quit Line or faxed referral                                   during hospitalization                  ( )  Recognizing danger situations (included triggers and roadblocks), if not completed on admission                    ( )  Coping skills (new ways to manage stress, exercise, relaxation techniques, changing routine, distraction), if not completed on admission                                                           ( )  Basic information about quitting (benefits of quitting, techniques in how to quit, available resources, if not completed on admission  ( ) Referral for counseling faxed to [facility]   ( ) Patient refused referral  ( ) Patient refused counseling  ( ) Patient refused smoking cessation medication upon discharge    Vaccinations (zana X if applicable and completed):  ( ) Patient states already received influenza vaccine elsewhere  ( ) Patient received influenza vaccine during this hospitalization  ( ) Patient refused influenza vaccine at this time              Brennan Swan, RN

## 2021-08-23 NOTE — GROUP NOTE
Group Therapy Note    Date: 8/22/2021    Group Start Time: 2030  Group End Time: 2110  Group Topic: Wrap-Up    [facility]        Group Therapy Note    Attendees: Goals and importance of goal setting discussed. Night time milieu activities discussed. Patient's Goal:  Make the best of the day    Notes:  Successful     Status After Intervention:  Improved    Participation Level:  Active Listener and Interactive    Participation Quality: Appropriate and Attentive      Speech:  normal      Thought Process/Content: Logical  Linear      Affective Functioning: Congruent      Mood: euthymic      Level of consciousness:  Alert and Oriented x4      Response to Learning: Progressing to goal      Endings: None Reported    Modes of Intervention: Support      Discipline Responsible: Behavorial Health Tech      Signature:  Samson Flores

## 2021-08-23 NOTE — PROGRESS NOTES
Purposeful Rounding    Patient Location: Patient room    Patient willing to engage in conversation: Yes    Presentation/behavior: Cooperative and Pleasant    Affect: Neutral/Euthymic(normal)    Concerns reported: No concerns reported. PRN medications given: Trazodone given per order.     Environmental assessment: Room free from clutter, Clear path to bathroom , Adequate lighting and No safety hazards noted    Fall prevention interventions in place: Yellow non-skid socks on    Daily [place] Fall Risk Score: 65    Daily Choi Fall Risk Score: 15

## 2021-08-26 NOTE — DISCHARGE SUMMARY
08/09/2021, Discharged on 08/13/2021   Component Date Value Ref Range Status    Cholesterol, Total 08/11/2021 167  0 - 199 mg/dL Final    Triglycerides 08/11/2021 81  0 - 150 mg/dL Final    HDL 08/11/2021 45  40 - 60 mg/dL Final    LDL Calculated 08/11/2021 106* <100 mg/dL Final    VLDL Cholesterol Calculated 08/11/2021 16  Not Established mg/dL Final    Hemoglobin A1C 08/11/2021 5.1  See comment % Final    Comment: Comment:  Diagnosis of Diabetes: > or = 6.5%  Increased risk of diabetes (Prediabetes): 5.7-6.4%  Glycemic Control: Nonpregnant Adults: <7.0%                    Pregnant: <6.0%        eAG 08/11/2021 99.7  mg/dL Final    TSH 08/11/2021 2.09  0.27 - 4.20 uIU/mL Final        Mental Status Exam at Discharge:  Level of consciousness:  awake  Appearance:  well-appearing, in chair, good grooming and good hygiene well-developed, well-nourished  Behavior/Motor:  no abnormalities noted normal gait and station AIMS: 0  Attitude toward examiner:  cooperative, attentive and good eye contact  Speech:  spontaneous, normal rate, normal volume and well articulated  Mood:  dysthymic  Affect:  mood congruent Anxiety: mild  Hallucinations: Absent  Thought processes:  coherent Attention span, Concentration & Attention:  attention span and concentration were age appropriate  Thought content:  Reality based no evidence of delusions OCD: none    Insight: normal insight and judgment Cognition:  oriented to person, place, and time  Fund of Knowledge: average  IQ:average Memory: intact  Suicide:  No specific plan to harm self  Sleep: sleeps through the night  Appetite: ok   Reassess Rita Risk:  no specific plan to harm self Pt has phone numbers to contact if suicidal thoughts recur and states pt will return to the hospital if suicidal feelings return.    Hospital Routine Meds:     Hospital PRN Meds:    Discharge Meds:    Discharge Medication List as of 8/13/2021  2:00 PM           Details   lamoTRIgine (LAMICTAL) 25 MG tablet Take 2 tablets by mouth daily, Disp-60 tablet, R-0Normal      ARIPiprazole (ABILIFY) 10 MG tablet Take 1 tablet by mouth daily, Disp-30 tablet, R-0Normal              Details   naproxen (EC NAPROSYN) 500 MG EC tablet Take 500 mg by mouth 2 times daily (with meals)Historical Med                   Disposition - Residence      Follow Up:  See Discharge Instructions

## 2021-09-16 NOTE — DISCHARGE SUMMARY
Department of Psychiatry    Discharge Summary      Manny Gomes  [id number]    Admission date:   8/18/2021    Discharge:   Date: 8/23/2021  Location: home    Inpatient Provider: Ema Crow MD  Unit: [facility]    Diagnosis on Admission:  Mood disorder, unspecified. Differential included bipolar  depression and borderline personality disorder    Diagnosis on Discharge:  Bipolar depression    Active Hospital Problems    Diagnosis Date Noted    Elevated BP without diagnosis of hypertension [R03.0]     Hyperlipidemia [E78.5]     Mild intermittent asthma without complication [X34.33]     Morbid obesity ([facility] [E66.01]     Bipolar depression ([facility] [F31.9] 08/18/2021     Reason for Admission:  From my admission note:  IDENTIFICATION:  This is a domiciled, never , recently unemployed  18-year-old with a history of mood symptoms and suicidality, who was  brought by jose to [facility] Emergency Department with increasing  thoughts of suicide.     SOURCES OF INFORMATION:  The patient. Recent admission. ED note.     CHIEF COMPLAINT:  \"I left, I don't think I was ready, I was still having  bad thoughts. \"     HISTORY OF PRESENT ILLNESS:  The patient was discharged from our program  about one week ago. She had outpatient followup at [facility]. She presented to an appointment with them yesterday and told  them she was having increased thoughts of suicide. They sent her by  jose to [facility] ED where she was evaluated and transferred to  us for further evaluation and treatment.     The patient reports he continues with symptoms of  depression including low mood, anhedonia, tearfulness, self-reproach,  fatigue, trouble sleeping, and thoughts of suicide. She says she  has thought of suicide intermittently; more frequently the last few days.     She was started on combination of Zoloft and Abilify during her last  admission and reports adherence to them so far.   In the emergency  department, she endorsed a number of symptoms including mood lability,  impulsive behaviors, atypical hallucinations, and \"being on a roller  coaster. \"     PSYCHIATRIC REVIEW OF SYSTEMS:  As above.     STRESSORS:  No new stressors.     PAST PSYCHIATRIC HISTORY:  History of suicidal ideation and suicide  attempts. Only one previous psychiatric hospitalization - here last week. Medication trials include Zoloft. She was placed on Abilify and  Lamictal last week.     SUBSTANCE USE HISTORY:  Fourth bottle of vodka nightly until about three  months ago. Now drinks rarely. No other substances. No treatment  programs.     MEDICAL HISTORY:  Noncontributory.     FAMILY PSYCHIATRIC HISTORY:  Mom, bipolar disorder and substance use.     MEDICATIONS:  Lamictal 25 mg twice daily and Abilify 10 mg daily.     ALLERGIES:  No known drug allergies.     SOCIAL HISTORY:  She lives with her great-grandmother. She is a high  school graduate. She was working for [employer], but recently resigned. She  has never been . She has no kids.     LEGAL HISTORY:  None.     REVIEW OF SYSTEMS:  She did not describe or endorse recent headaches,  change in vision, chest pain, shortness of breath, cough, sore throat,  fevers, abdominal pain, neurological problems, bleeding problems or skin  problems. She was moving all four extremities and speaking without  difficulty.     MENTAL STATUS EXAMINATION on admission:  The patient presented in personal clothes. She spoke freely and had good eye contact. She was socially  appropriate. She described her mood as \"down\" and had a  mood-incongruent affect. She had no psychomotor agitation or  retardation.     She spoke softly. She was not pressured. She was oriented to the date,  day, place, and the context of this evaluation.   Her memory was intact.     Her use of language, speech, and educational attainment suggested an  average level of intellectual functioning.     Her thought processes were logical and goal-directed. She did not  describe or endorse delusions or homicidal thinking. She did endorse  some atypical-like and occasional hallucinations, and intermittent  thoughts of suicide. She reported feeling safe here and willing to  approach staff with concerns.     Her ability for abstract thought was fair based on her interpretation of  simple proverbs.     Insight and judgment were fair.     PHYSICAL EXAMINATION on admission:  VITAL SIGNS:  Temperature 97.5, pulse 96, respiratory rate 16, blood  pressure 140/70. NEUROLOGIC:  Gait normal.     LABORATORY DATA on admission:  Shows a CMP within normal limits. TSH within normal  limits. Ethanol level not detectable. Urine drug screen negative. Acetaminophen and salicylate levels below threshold. CBC within normal  limits. COVID-19 negative. UA clear. Pregnancy test negative.     FORMULATION on admission: This is a domiciled, never , recently unemployed  49-year-old with a history of mood symptoms, who was brought by squad to  [facility] with increasing mood symptoms and  thoughts of suicide. The patient was transferred and admitted for  further evaluation and treatment. Given her symptoms and suicidality,  she requires short inpatient stabilization and treatment.     DIAGNOSES on admission:  Mood disorder, unspecified. Differential includes bipolar  depression and borderline personality disorder. Hospital Course:   1.  Admitted to Inpatient psychiatry for short stabilization.     2.  On admission, increased Lamictal to 50 mg twice daily and continued Abilify 10 mg daily.  Ordered every 15-minute checks for safety, programming, and  p.r.n. medication for anxiety, agitation, and insomnia.     8/21/2021 - increased Lamictal to 75mg BID.  8/23/2021- increased Lamictal to 200mg QHS    Ms. Stephanie Webber stabilized with treatment including medication, programming, and the structured milieu.  Her mood stabilized, her SI resolved, and she became future oriented. She was active in the milieu and in programming. She tolerated the increased dose of Lamictal well. She demonstrates safe behavior throughout her admission. She committed to continuing treatment after discharge.      3.  Internal medicine consult for admission. Elevated Blood Pressure  - monitor  - may need f/u OP, no initiation of medications at this time     HLD  - lifestyle mgmt     Asthma  - mild, intermittent  - no AE  - uses an inhaler ~1x/year when she gets sick     4. The patient was admitted on a Statement of Belief, but agreed to stay voluntarily. Complications: none;  Heather Fuller did not require emergency psychiatric intervention during this admission such as restraint or emergency medication. Vital signs in last 24 hours:  Vitals:    08/23/21 0830   BP: 120/76   Pulse: 109   Resp: 16   Temp: 98 °F (36.7 °C)   SpO2: 98%       Mental Status Examination on Discharge:    Appearance: fair grooming and hygiene  Behavior/Attitude toward examiner:  cooperative, attentive and fair eye contact  Speech: Normal rate, volume, amount  Mood:  \"better\"  Affect:  mood congruent  Thought processes:  Goal directed, linear, no JARON or gross disorganization  Thought Content: no SI, no HI, no delusions voiced, no obsessions  Perceptions: no AVH  Attention: attention span and concentration were intact to interview   Abstraction: intact  Cognition:  Alert and oriented to person, place, time, and situation, recall intact  Insight: improved  Judgment: improved    Discharge on regular diet, continue activity as tolerated. Condition on Discharge:  Heather Fuller was in stable condition. Heather Fuller did not have suicidal or homicidal thoughts, and was future oriented. Heather Fuller did not represent an imminent risk to self or others.  However, given static risk factors, Heather Fuller remains at perpetual elevated risk going forward. Medication List      START taking these medications    hydrOXYzine 25 MG capsule  Commonly known as: VISTARIL  Take 1 capsule by mouth daily as needed for Anxiety        CHANGE how you take these medications    lamoTRIgine 200 MG tablet  Commonly known as: LAMICTAL  Take 1 tablet by mouth daily  What changed:   · medication strength  · how much to take        CONTINUE taking these medications    ARIPiprazole 10 MG tablet  Commonly known as: ABILIFY  Take 1 tablet by mouth daily        STOP taking these medications    naproxen 500 MG EC tablet  Commonly known as: EC NAPROSYN           Where to Get Your Medications      These medications were sent to [facility], [address] [place] [address]  [address], [address] [address]    Phone: [phone]   · ARIPiprazole 10 MG tablet  · hydrOXYzine 25 MG capsule  · lamoTRIgine 200 MG tablet         Follow-up Plan: The following was given to the patient at discharge: The Crisis Number for [facility] is [phone]. This Hotline may be accessed 24 hours a day, 7 days a week. Please follow up with your PCP regarding any pending labs.      Your next appointment is:  Name of Provider: [facility]  Provider specialty/license: therapy/counseling  Date and time of appointment: Monday, August 30th 3:00-4:00 PM  The type/s of services requested are: Counseling  Agency name: [facility]  Address: [address] [place] [address]   Phone Number: [phone]  Special instructions (what to bring to appointment, etc.): NA    Additional Appointment:  Name of Provider: Matthew Noonan [place] [address]  Provider specialty/license: Psychiatric Nurse Practitioner  Date and time of appointment: Tuesday, September 14th 9:00-10:00  The type/s of services requested are: Medication Management  Agency name: [facility]  Address: [address] [place] [address]   Phone Number: [phone]  Special instructions (what to bring to appointment, etc.): NA    **With the current concerns for Coronavirus (COVID-19), please contact your providers prior to going in to their offices. [facility] and agencies have adjusted their practices to reduce spread of the illness. If you have any questions about the virus or recommendations for home care, please call the [facility]) COVID-19 hotline at [phone]. For all emergencies, please contact 911. **    More than 30 minutes were spent with the patient in completing this  evaluation and more than 50% of the time was spent completing this  evaluation, providing counseling, and planning treatment with the  patient.

## 2022-10-08 ENCOUNTER — HOSPITAL ENCOUNTER (EMERGENCY)
Age: 22
Discharge: PSYCHIATRIC OR SUBSTANCE USE FACILITY | End: 2022-10-09
Attending: EMERGENCY MEDICINE
Payer: COMMERCIAL

## 2022-10-08 DIAGNOSIS — N30.00 ACUTE CYSTITIS WITHOUT HEMATURIA: ICD-10-CM

## 2022-10-08 DIAGNOSIS — R45.851 SUICIDAL IDEATION: Primary | ICD-10-CM

## 2022-10-08 PROCEDURE — 80048 BASIC METABOLIC PNL TOTAL CA: CPT

## 2022-10-08 PROCEDURE — 80179 DRUG ASSAY SALICYLATE: CPT

## 2022-10-08 PROCEDURE — 84703 CHORIONIC GONADOTROPIN ASSAY: CPT

## 2022-10-08 PROCEDURE — 85025 COMPLETE CBC W/AUTO DIFF WBC: CPT

## 2022-10-08 PROCEDURE — 99285 EMERGENCY DEPT VISIT HI MDM: CPT

## 2022-10-08 PROCEDURE — 87635 SARS-COV-2 COVID-19 AMP PRB: CPT

## 2022-10-08 PROCEDURE — 82077 ASSAY SPEC XCP UR&BREATH IA: CPT

## 2022-10-08 PROCEDURE — 80143 DRUG ASSAY ACETAMINOPHEN: CPT

## 2022-10-08 PROCEDURE — 80307 DRUG TEST PRSMV CHEM ANLYZR: CPT

## 2022-10-08 PROCEDURE — 81001 URINALYSIS AUTO W/SCOPE: CPT

## 2022-10-08 PROCEDURE — 36415 COLL VENOUS BLD VENIPUNCTURE: CPT

## 2022-10-08 PROCEDURE — 87086 URINE CULTURE/COLONY COUNT: CPT

## 2022-10-08 ASSESSMENT — ENCOUNTER SYMPTOMS
VOMITING: 0
NAUSEA: 0
ABDOMINAL PAIN: 0
SHORTNESS OF BREATH: 0
RHINORRHEA: 0
COUGH: 0
DIARRHEA: 0

## 2022-10-09 ENCOUNTER — HOSPITAL ENCOUNTER (INPATIENT)
Age: 22
LOS: 4 days | Discharge: HOME | DRG: 753 | End: 2022-10-13
Attending: PSYCHIATRY & NEUROLOGY | Admitting: PSYCHIATRY & NEUROLOGY
Payer: COMMERCIAL

## 2022-10-09 VITALS
SYSTOLIC BLOOD PRESSURE: 134 MMHG | TEMPERATURE: 98.4 F | HEART RATE: 78 BPM | HEIGHT: 62 IN | WEIGHT: 256 LBS | OXYGEN SATURATION: 98 % | DIASTOLIC BLOOD PRESSURE: 71 MMHG | RESPIRATION RATE: 21 BRPM | BODY MASS INDEX: 47.11 KG/M2

## 2022-10-09 PROBLEM — F32.9 MAJOR DEPRESSIVE DISORDER WITH CURRENT ACTIVE EPISODE, UNSPECIFIED DEPRESSION EPISODE SEVERITY, UNSPECIFIED WHETHER RECURRENT: Status: ACTIVE | Noted: 2022-10-09

## 2022-10-09 PROBLEM — F33.9 MAJOR DEPRESSIVE DISORDER, RECURRENT (HCC): Status: ACTIVE | Noted: 2022-10-09

## 2022-10-09 LAB
ACETAMINOPHEN LEVEL: <5 UG/ML (ref 10–30)
AMPHETAMINE SCREEN, URINE: ABNORMAL
ANION GAP SERPL CALCULATED.3IONS-SCNC: 12 MMOL/L (ref 3–16)
BACTERIA: ABNORMAL /HPF
BARBITURATE SCREEN URINE: ABNORMAL
BASOPHILS ABSOLUTE: 0 K/UL (ref 0–0.2)
BASOPHILS RELATIVE PERCENT: 0 %
BENZODIAZEPINE SCREEN, URINE: ABNORMAL
BILIRUBIN URINE: NEGATIVE
BLOOD, URINE: NEGATIVE
BUN BLDV-MCNC: 15 MG/DL (ref 7–20)
CALCIUM SERPL-MCNC: 9.4 MG/DL (ref 8.3–10.6)
CANNABINOID SCREEN URINE: POSITIVE
CHLORIDE BLD-SCNC: 100 MMOL/L (ref 99–110)
CLARITY: ABNORMAL
CO2: 23 MMOL/L (ref 21–32)
COCAINE METABOLITE SCREEN URINE: ABNORMAL
COLOR: ABNORMAL
CREAT SERPL-MCNC: 0.8 MG/DL (ref 0.6–1.1)
EOSINOPHILS ABSOLUTE: 0.4 K/UL (ref 0–0.6)
EOSINOPHILS RELATIVE PERCENT: 3 %
EPITHELIAL CELLS, UA: 18 /HPF (ref 0–5)
ETHANOL: NORMAL MG/DL (ref 0–0.08)
FENTANYL SCREEN, URINE: ABNORMAL
GFR AFRICAN AMERICAN: >60
GFR NON-AFRICAN AMERICAN: >60
GLUCOSE BLD-MCNC: 99 MG/DL (ref 70–99)
GLUCOSE URINE: NEGATIVE MG/DL
HCG QUALITATIVE: NEGATIVE
HCT VFR BLD CALC: 39.5 % (ref 36–48)
HEMOGLOBIN: 12.2 G/DL (ref 12–16)
HYALINE CASTS: 2 /LPF (ref 0–8)
KETONES, URINE: ABNORMAL MG/DL
LEUKOCYTE ESTERASE, URINE: ABNORMAL
LYMPHOCYTES ABSOLUTE: 1.6 K/UL (ref 1–5.1)
LYMPHOCYTES RELATIVE PERCENT: 13 %
Lab: ABNORMAL
MCH RBC QN AUTO: 27.9 PG (ref 26–34)
MCHC RBC AUTO-ENTMCNC: 31 G/DL (ref 31–36)
MCV RBC AUTO: 90.1 FL (ref 80–100)
METHADONE SCREEN, URINE: ABNORMAL
MICROSCOPIC EXAMINATION: YES
MONOCYTES ABSOLUTE: 0.8 K/UL (ref 0–1.3)
MONOCYTES RELATIVE PERCENT: 6 %
NEUTROPHILS ABSOLUTE: 9.8 K/UL (ref 1.7–7.7)
NEUTROPHILS RELATIVE PERCENT: 78 %
NITRITE, URINE: NEGATIVE
OPIATE SCREEN URINE: ABNORMAL
OXYCODONE URINE: ABNORMAL
PDW BLD-RTO: 14.7 % (ref 12.4–15.4)
PH UA: 5.5
PH UA: 5.5 (ref 5–8)
PHENCYCLIDINE SCREEN URINE: ABNORMAL
PLATELET # BLD: 413 K/UL (ref 135–450)
PMV BLD AUTO: 7.8 FL (ref 5–10.5)
POTASSIUM SERPL-SCNC: 3.9 MMOL/L (ref 3.5–5.1)
PROTEIN UA: 30 MG/DL
RBC # BLD: 4.38 M/UL (ref 4–5.2)
RBC # BLD: NORMAL 10*6/UL
RBC UA: 2 /HPF (ref 0–4)
SALICYLATE, SERUM: <0.3 MG/DL (ref 15–30)
SARS-COV-2, NAAT: NOT DETECTED
SODIUM BLD-SCNC: 135 MMOL/L (ref 136–145)
SPECIFIC GRAVITY UA: >=1.03 (ref 1–1.03)
TSH SERPL DL<=0.05 MIU/L-ACNC: 2.35 UIU/ML (ref 0.27–4.2)
URINE REFLEX TO CULTURE: YES
URINE TYPE: ABNORMAL
UROBILINOGEN, URINE: 1 E.U./DL
WBC # BLD: 12.6 K/UL (ref 4–11)
WBC UA: 35 /HPF (ref 0–5)

## 2022-10-09 PROCEDURE — 36415 COLL VENOUS BLD VENIPUNCTURE: CPT

## 2022-10-09 PROCEDURE — 84443 ASSAY THYROID STIM HORMONE: CPT

## 2022-10-09 PROCEDURE — 1240000000 HC EMOTIONAL WELLNESS R&B

## 2022-10-09 PROCEDURE — 83036 HEMOGLOBIN GLYCOSYLATED A1C: CPT

## 2022-10-09 PROCEDURE — 80061 LIPID PANEL: CPT

## 2022-10-09 PROCEDURE — 93005 ELECTROCARDIOGRAM TRACING: CPT | Performed by: PHYSICIAN ASSISTANT

## 2022-10-09 PROCEDURE — 6370000000 HC RX 637 (ALT 250 FOR IP): Performed by: PHYSICIAN ASSISTANT

## 2022-10-09 RX ORDER — DIPHENHYDRAMINE HYDROCHLORIDE 50 MG/ML
50 INJECTION INTRAMUSCULAR; INTRAVENOUS EVERY 4 HOURS PRN
Status: DISCONTINUED | OUTPATIENT
Start: 2022-10-09 | End: 2022-10-13 | Stop reason: HOSPADM

## 2022-10-09 RX ORDER — LANOLIN ALCOHOL/MO/W.PET/CERES
3 CREAM (GRAM) TOPICAL NIGHTLY PRN
Status: DISCONTINUED | OUTPATIENT
Start: 2022-10-09 | End: 2022-10-13 | Stop reason: HOSPADM

## 2022-10-09 RX ORDER — TRAZODONE HYDROCHLORIDE 50 MG/1
50 TABLET ORAL NIGHTLY PRN
Status: ON HOLD | COMMUNITY
End: 2022-10-13

## 2022-10-09 RX ORDER — ARIPIPRAZOLE LAUROXIL 882 MG/3.2ML
882 INJECTION, SUSPENSION, EXTENDED RELEASE INTRAMUSCULAR ONCE
Status: ON HOLD | COMMUNITY
End: 2022-10-09

## 2022-10-09 RX ORDER — MAGNESIUM HYDROXIDE/ALUMINUM HYDROXICE/SIMETHICONE 120; 1200; 1200 MG/30ML; MG/30ML; MG/30ML
30 SUSPENSION ORAL EVERY 6 HOURS PRN
Status: DISCONTINUED | OUTPATIENT
Start: 2022-10-09 | End: 2022-10-13 | Stop reason: HOSPADM

## 2022-10-09 RX ORDER — ACETAMINOPHEN 325 MG/1
650 TABLET ORAL EVERY 4 HOURS PRN
Status: DISCONTINUED | OUTPATIENT
Start: 2022-10-09 | End: 2022-10-13 | Stop reason: HOSPADM

## 2022-10-09 RX ORDER — HYDROXYZINE 50 MG/1
50 TABLET, FILM COATED ORAL 3 TIMES DAILY PRN
Status: DISCONTINUED | OUTPATIENT
Start: 2022-10-09 | End: 2022-10-13 | Stop reason: HOSPADM

## 2022-10-09 RX ORDER — OLANZAPINE 5 MG/1
5 TABLET ORAL EVERY 4 HOURS PRN
Status: DISCONTINUED | OUTPATIENT
Start: 2022-10-09 | End: 2022-10-13 | Stop reason: HOSPADM

## 2022-10-09 RX ORDER — CEPHALEXIN 250 MG/1
500 CAPSULE ORAL ONCE
Status: COMPLETED | OUTPATIENT
Start: 2022-10-09 | End: 2022-10-09

## 2022-10-09 RX ADMIN — CEPHALEXIN 500 MG: 250 CAPSULE ORAL at 01:15

## 2022-10-09 ASSESSMENT — SLEEP AND FATIGUE QUESTIONNAIRES
DO YOU USE A SLEEP AID: NO
DO YOU HAVE DIFFICULTY SLEEPING: NO
DO YOU USE A SLEEP AID: NO
AVERAGE NUMBER OF SLEEP HOURS: 5
DO YOU HAVE DIFFICULTY SLEEPING: NO

## 2022-10-09 ASSESSMENT — LIFESTYLE VARIABLES
HOW MANY STANDARD DRINKS CONTAINING ALCOHOL DO YOU HAVE ON A TYPICAL DAY: 10 OR MORE
HOW MANY STANDARD DRINKS CONTAINING ALCOHOL DO YOU HAVE ON A TYPICAL DAY: 1 OR 2
HOW OFTEN DO YOU HAVE A DRINK CONTAINING ALCOHOL: 2-4 TIMES A MONTH
HOW OFTEN DO YOU HAVE A DRINK CONTAINING ALCOHOL: 2-4 TIMES A MONTH

## 2022-10-09 ASSESSMENT — PATIENT HEALTH QUESTIONNAIRE - PHQ9: SUM OF ALL RESPONSES TO PHQ QUESTIONS 1-9: 19

## 2022-10-09 NOTE — ED NOTES
Rounded on patient, all needs addressed at this time.  Sitter at [facility]  10/09/22 3231

## 2022-10-09 NOTE — ED NOTES
Rounded on patient, all needs addressed at this time.  Sitter at [facility]  10/09/22 0802

## 2022-10-09 NOTE — ED NOTES
A safety risk assessment of the patient environment was conducted and the room was modified for safety. The room is free of all removed equipment, medical supplies, and articles that may pose a threat to the patient or others such as sharp items and needle boxes. Items were removed from unlocked drawers, cabinets are locked when locks are available, extra linens, medical cords, cables, pictures from wall, ect. are all removed. The patient call light was left in place due to the medical nature of the unit and the needs of the patient. The patient was place in a room close to the nursing desk. The patient was placed in a hospital gown with ties removed. A search of the patient and patient environment was performed. Two staff members (including ) conducted a witnessed search of all belongings in the presence of the patient. All personal items including sharp objects, belts, ties, and ingestibles were removed and secured. The patient and family were educated regarding items brought in by family members and visitors will be searched to verify that no potentially dangerous items are brought in to the patient. If a visitor refuses search of items- visitor will be instructed that the items cannot enter patient room. Patient  at bedside. Bed locked and in lowest position with both side rails raised. Call light within reach. Will monitor PT hourly.         hospitals  10/09/22 0514

## 2022-10-09 NOTE — ED PROVIDER NOTES
[facility]        Pt Name: Kp Turner  MRN: [id number]  [place] 2000  Date of evaluation: 10/8/2022  Provider: Mk Singh PA-C  PCP: No primary care provider on file. Note Started: 11:50 PM EDT        I have seen and evaluated this patient with my supervising physician Izzy Herring MD.    CHIEF COMPLAINT       Chief Complaint   Patient presents with    Suicide Attempt     Pt via police from home, pt states she has been extra sad lately and tried to harm herself yesterday trying to take extra trazadone. Has new hx of bipolar disorder, has had admissions before and stopped taking her psych meds. States no specific stressors. Pt teary and pleasant at triage       HISTORY OF PRESENT ILLNESS   (Location, Timing/Onset, Context/Setting, Quality, Duration, Modifying Factors, Severity, Associated Signs and Symptoms)  Note limiting factors. Chief Complaint: Suicidal ideation, suicide attempt    Kp Turner is a 19 y.o. female who presents to the emergency department today for evaluation for suicidal ideation, suicide attempt. The patient does have a history of anxiety, depression, bipolar disorder. Patient was recently admitted however stopped taking her medications. The patient states that she has been under a lot of stress, as she states that she misses her sister who is now in a group home in [place]. Patient states that she took extra trazodone last night, less than 10 in an attempt to harm herself. This was over 24 hours ago. She has not taken any medications today. She is very tearful on exam.  She denies any homicidal ideations. She has no hallucinations. Denies any alcohol or drug use. No chest pain shortness of breath or abdominal pain. No recent illnesses no other complaints. Nursing Notes were all reviewed and agreed with or any disagreements were addressed in the HPI.     REVIEW OF SYSTEMS (2-9 systems for level 4, 10 or more for level 5)     Review of Systems   Constitutional:  Negative for activity change, appetite change, chills and fever. HENT:  Negative for congestion and rhinorrhea. Respiratory:  Negative for cough and shortness of breath. Cardiovascular:  Negative for chest pain. Gastrointestinal:  Negative for abdominal pain, diarrhea, nausea and vomiting. Genitourinary:  Negative for difficulty urinating, dysuria and hematuria. Psychiatric/Behavioral:  Positive for suicidal ideas. Positives and Pertinent negatives as per HPI. Except as noted above in the ROS, all other systems were reviewed and negative. PAST MEDICAL HISTORY     Past Medical History:   Diagnosis Date    Depression          SURGICAL HISTORY   No past surgical history on file. CURRENTMEDICATIONS       Previous Medications    ARIPIPRAZOLE (ABILIFY) 10 MG TABLET    Take 1 tablet by mouth daily    HYDROXYZINE (VISTARIL) 25 MG CAPSULE    Take 1 capsule by mouth daily as needed for Anxiety    LAMOTRIGINE (LAMICTAL) 200 MG TABLET    Take 1 tablet by mouth daily         ALLERGIES     Coconut fatty acids, Macadamia nut oil, Nutritional supplements, and Pineapple extract    FAMILYHISTORY       Family History   Problem Relation Age of Onset    Mental Illness Mother     No Known Problems Sister           SOCIAL HISTORY       Social History     Tobacco Use    Smoking status: Never    Smokeless tobacco: Never   Substance Use Topics    Alcohol use: No    Drug use: No       SCREENINGS             PHYSICAL EXAM    (up to 7 for level 4, 8 or more for level 5)     ED Triage Vitals [10/08/22 2313]   BP Temp Temp Source Heart Rate Resp SpO2 Height Weight   134/71 98.4 °F (36.9 °C) Oral (!) 104 21 98 % 5' 2\" (1.575 m) 256 lb (116.1 kg)       Physical Exam  Vitals and nursing note reviewed. Constitutional:       Appearance: She is well-developed. She is not diaphoretic.    HENT:      Head: Normocephalic and atraumatic. Right Ear: External ear normal.      Left Ear: External ear normal.      Nose: Nose normal.   Eyes:      General:         Right eye: No discharge. Left eye: No discharge. Neck:      Trachea: No tracheal deviation. Cardiovascular:      Rate and Rhythm: Normal rate and regular rhythm. Heart sounds: No murmur heard. Pulmonary:      Effort: Pulmonary effort is normal. No respiratory distress. Breath sounds: Normal breath sounds. No wheezing or rales. Abdominal:      General: Bowel sounds are normal. There is no distension. Palpations: Abdomen is soft. Tenderness: no abdominal tenderness There is no guarding. Musculoskeletal:         General: Normal range of motion. Cervical back: Normal range of motion and neck supple. Skin:     General: Skin is warm and dry. Neurological:      Mental Status: She is alert and oriented to person, place, and time. Psychiatric:         Mood and Affect: Affect is tearful. Behavior: Behavior normal.         Thought Content: Thought content includes suicidal ideation. Thought content includes suicidal plan.        DIAGNOSTIC RESULTS   LABS:    Labs Reviewed   CBC WITH AUTO DIFFERENTIAL - Abnormal; Notable for the following components:       Result Value    WBC 12.6 (*)     All other components within normal limits   BASIC METABOLIC PANEL - Abnormal; Notable for the following components:    Sodium 135 (*)     All other components within normal limits   URINALYSIS WITH REFLEX TO CULTURE - Abnormal; Notable for the following components:    Color, UA DARK YELLOW (*)     Clarity, UA TURBID (*)     Ketones, Urine TRACE (*)     Protein, UA 30 (*)     Leukocyte Esterase, Urine SMALL (*)     All other components within normal limits   ACETAMINOPHEN LEVEL - Abnormal; Notable for the following components:    Acetaminophen Level <5 (*)     All other components within normal limits   SALICYLATE LEVEL - Abnormal; Notable for the following components:    Salicylate, Serum <0.5 (*)     All other components within normal limits   MICROSCOPIC URINALYSIS - Abnormal; Notable for the following components:    Bacteria, UA 4+ (*)     WBC, UA 35 (*)     Epithelial Cells, UA 18 (*)     All other components within normal limits   COVID-19, RAPID   CULTURE, URINE   HCG, SERUM, QUALITATIVE   URINE DRUG SCREEN   ETHANOL       When ordered only abnormal lab results are displayed. All other labs were within normal range or not returned as of this dictation. EKG: When ordered, EKG's are interpreted by the Emergency Department Physician in the absence of a cardiologist.  Please see their note for interpretation of EKG. RADIOLOGY:   Non-plain film images such as CT, Ultrasound and MRI are read by the radiologist. Plain radiographic images are visualized and preliminarily interpreted by the ED Provider with the below findings:        Interpretation per the Radiologist below, if available at the time of this note:    No orders to display     No results found. PROCEDURES   Unless otherwise noted below, none     Procedures    CRITICAL CARE TIME       CONSULTS:  None      EMERGENCY DEPARTMENT COURSE and DIFFERENTIAL DIAGNOSIS/MDM:   Vitals:    Vitals:    10/08/22 2313   BP: 134/71   Pulse: (!) 104   Resp: 21   Temp: 98.4 °F (36.9 °C)   TempSrc: Oral   SpO2: 98%   Weight: 256 lb (116.1 kg)   Height: 5' 2\" (1.575 m)       Patient was given the following medications:  Medications   cephALEXin (KEFLEX) capsule 500 mg (has no administration in time range)         Is this patient to be included in the SEP-1 Core Measure due to severe sepsis or septic shock? No   Exclusion criteria - the patient is NOT to be included for SEP-1 Core Measure due to: Infection is not suspected    Briefly, this is a 17-year-old female who presents to the emergency department today for evaluation for suicidal ideation. Patient has been under a lot of stress recently.   Was recently admitted, has not been taking her medications. Patient did take extra trazodone last night, over 24 hours ago in an attempt to harm herself, no medications today. On examination, she is tearful on exam otherwise is calm, and cooperative. EKG seconded by my attending, please see his note for further details. She has a nonspecific leukocytosis of 12.6 and likely due to UTI as urine does show small leukocytes and 35 white blood cells, will treat with Keflex. BMP is unremarkable. Pregnancy is negative, COVID-negative, talk screen surgery today. 72-hour hold signed by my attending, suicide precautions and sitter in place, the patient is otherwise stable for transfer at this time, we are awaiting acceptance from a psychiatric facility. FINAL IMPRESSION      1. Suicidal ideation    2. Acute cystitis without hematuria          DISPOSITION/PLAN   DISPOSITION Decision To Transfer 10/09/2022 12:23:56 AM      PATIENT REFERRED TO:  No follow-up provider specified.     DISCHARGE MEDICATIONS:  New Prescriptions    No medications on file       DISCONTINUED MEDICATIONS:  Discontinued Medications    No medications on file              (Please note that portions of this note were completed with a voice recognition program.  Efforts were made to edit the dictations but occasionally words are mis-transcribed.)    Corbin Cheung PA-C (electronically signed)           Corbin Cheung PA-C  10/09/22 4502

## 2022-10-09 NOTE — ED NOTES
Rounded on patient, all needs addressed at this time.  Sitter at [facility], [facility]  10/09/22 4572

## 2022-10-09 NOTE — ED NOTES
Rounded on patient, all needs addressed at this time.  Sitter at [facility], [facility]  10/09/22 5320

## 2022-10-09 NOTE — GROUP NOTE
Group Therapy Note    Date: 10/9/2022    Group Start Time: 1300  Group End Time: 4905  Group Topic: Activity    [address]        Group Therapy Note    Attendees: 10    Group members participated in a game of Jeopardy to build teamwork skills, build positive socialization skills, and stimulate critical thinking skills. Patient's Goal:      Notes:  PT was alert and actively participated in the activity. PT engaged well with her group and was supportive of all other group members. Status After Intervention:  Improved    Participation Level:  Active Listener and Interactive    Participation Quality: Appropriate, Attentive, Sharing, and Supportive      Speech:  normal      Thought Process/Content: Logical      Affective Functioning: Congruent      Mood: euthymic      Level of consciousness:  Alert, Oriented x4, and Attentive      Response to Learning: Able to verbalize current knowledge/experience, Able to verbalize/acknowledge new learning, Able to retain information, Capable of insight, Able to change behavior, and Progressing to goal      Endings: None Reported    Modes of Intervention: Education, Support, Socialization, Exploration, and Activity      Discipline Responsible: /Counselor      Signature:  SUMANTH Sampson

## 2022-10-09 NOTE — ED NOTES
Rounded on patient, all needs addressed at this time.  Sitter at [facility], [facility], [facility]  10/09/22 8980

## 2022-10-09 NOTE — ED NOTES
Report called to Josh Alexis - talked to [facility] - gave report over the phone reference patient     Shanika Torres, RN  10/09/22 3371

## 2022-10-09 NOTE — BH NOTE
[facility]  Admission Note     Admission Type:   Admission Type: Voluntary    Reason for admission:  Reason for Admission: MDD with possible suicide attempt      Addictive Behavior:   Addictive Behavior  In the Past 3 Months, Have You Felt or Has Someone Told You That You Have a Problem With  : Eating (too much/too little)    Medical Problems:   Past Medical History:   Diagnosis Date    Depression        Status EXAM:  Mental Status and Behavioral Exam  Normal: No  Level of Assistance: Independent/Self  Facial Expression: Worried  Affect: Blunt  Level of Consciousness: Alert  Frequency of Checks: 4 times per hour, close  Mood:Normal: No  Mood: Depressed, Anxious  Motor Activity:Normal: Yes  Eye Contact: Good  Observed Behavior: Impulsive, Cooperative, Friendly  Sexual Misconduct History: Past - no  Preception: [place] to person, [place] to time, [place] to place, [place] to situation  Attention:Normal: Yes  Thought Processes: Other (comment) (linear)  Thought Content:Normal: Yes  Depression Symptoms: Feelings of worthlessness, Isolative, Sleep disturbance  Anxiety Symptoms: Generalized  Snehal Symptoms: No problems reported or observed.   Hallucinations: None  Delusions: No  Memory:Normal: Yes  Insight and Judgment: No  Insight and Judgment: Poor judgment, Poor insight    Tobacco Screening:  Practical Counseling, on admission, zana X, if applicable and completed (first 3 are required if patient doesn't refuse):            ( ) Recognizing danger situations (included triggers and roadblocks)                    ( ) Coping skills (new ways to manage stress,relaxation techniques, changing routine, distraction)                                                           ( ) Basic information about quitting (benefits of quitting, techniques in how to quit, available resources  ( ) Referral for counseling faxed to [facility] ( ) Patient refused counseling  ( x) Patient has not smoked in the last 30 days    Metabolic Screening:    Lab Results   Component Value Date    LABA1C 5.1 08/11/2021       Lab Results   Component Value Date    CHOL 167 08/11/2021     Lab Results   Component Value Date    TRIG 81 08/11/2021     Lab Results   Component Value Date    HDL 45 08/11/2021     No components found for: [facility]  Lab Results   Component Value Date    LABVLDL 16 08/11/2021         Body mass index is 41.32 kg/m². BP Readings from Last 2 Encounters:   10/09/22 (!) 135/103   10/08/22 134/71           Pt admitted with followings belongings:  Dental Appliances: None  Vision - Corrective Lenses: Eyeglasses  Hearing Aid: None  Jewelry: Bracelet  Body Piercings Removed: N/A  Clothing:  Footwear, Pants, Shirt, Socks  Other Valuables: Money, Personal Toiletries ($13.21 in safe)    Devon Lopez RN

## 2022-10-09 NOTE — ED NOTES
All paperwork given to [facility] [address] prior to patient transport     Cele Rodriguez RN  10/09/22 1007

## 2022-10-09 NOTE — ED PROVIDER NOTES
[facility]    Physician Attestation    Pt Name: Edy Gupta  MRN: [id number]  [place] 2000  Date of evaluation: 10/8/22        Physician Note:    I havepersonally performed and/or participated in the history, exam and medical decision making and agree with all pertinent clinical information. I have also reviewed and agree with the past medical, family and social historyunless otherwise noted. I have personally performed a face to face diagnostic evaluation onthis patient. I have reviewed the mid-levels findings and agree. History: She was admitted and discharged on 823 for bipolar disorder and suicidal ideation she was placed on Lamictal and is also taking Abilify and Zoloft      REVIEW OF SYSTEMS    Constitutional:  Denies fever, chills, or weakness   Eyes:  Denies vision changes  HENT:  Denies sore throat or neck pain   Respiratory:  Denies cough or shortness of breath   Cardiovascular:  Denies chest pain  GI:  Denies abdominal pain, nausea, vomiting, or diarrhea   Musculoskeletal:  Denies back pain   Skin: no rash or vesicles   Neurologic:  no headache weakness focal    Lymphatic:  no swollen  nodes   Psychiatric: no  hs thoughts     All systems negative except as marked. General Appearance:  Alert, cooperative, no distress, appears stated age. Head:  Normocephalic, without obvious abnormality, atraumatic. Eyes:  conjunctiva/corneas clear, EOM's intact. Sclera anicteric. ENT: Mucous membranes moist.   Neck: Supple, symmetrical, trachea midline, no adenopathy. No jugular venous distention. Lungs:   No Respiratory Distress. no rales  rhonchi rub   Chest Wall:  Nontender  no deformity   Heart:  Rsr no murmer gallop    Abdomen:   Soft nontender no organomegally    Extremities:  Full range of motion. no deformity   Pulses: Equal  upper and lower    Skin:  No rashes or lesions to exposed skin.    Neurologic: Alert and oriented X 3. Motor grossly normal.  Speech clear. Cr n 2-12 intact   Mood is depressed affect depressed suicidal ideation wants to cut her wrists she does endorse that she took 5 trazodone pills yesterday to try to sleep and kill her self she woke up went to work and had more thoughts of cutting herself  EKG Interpretation    Interpreted by emergency department physician    Rhythm: sinus tachycardia  Rate: 110-120  Axis: normal  Ectopy: none  Conduction: normal  ST Segments: no acute change  T Waves: no acute change  Q Waves: none    Clinical Impression: sinus tachycardia    Mirella Cross MD      1. Suicidal ideation    2. Acute cystitis without hematuria          DISPOSITION/PLAN  PATIENT REFERRED TO:  No follow-up provider specified. DISCHARGE MEDICATIONS:  New Prescriptions    No medications on file         Is this patient to be included in the SEP-1 Core Measure due to severe sepsis or septic shock? No   Exclusion criteria - the patient is NOT to be included for SEP-1 Core Measure due to:   Infection is not suspected      MD Mirella Tejeda MD  10/09/22 9770
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
## 2022-10-09 NOTE — ED NOTES
Rounded on patient, all needs addressed at this time.  Sitter at [facility], [facility]  10/09/22 4179

## 2022-10-09 NOTE — ED NOTES
Patient very quiet and cooperative     Kelsie Charles, [facility]  10/09/22 1007

## 2022-10-10 PROBLEM — F33.9 MAJOR DEPRESSIVE DISORDER, RECURRENT (HCC): Status: RESOLVED | Noted: 2022-10-09 | Resolved: 2022-10-10

## 2022-10-10 PROBLEM — F32.9 MAJOR DEPRESSIVE DISORDER WITH CURRENT ACTIVE EPISODE, UNSPECIFIED DEPRESSION EPISODE SEVERITY, UNSPECIFIED WHETHER RECURRENT: Status: RESOLVED | Noted: 2022-10-09 | Resolved: 2022-10-10

## 2022-10-10 PROBLEM — F12.90 CANNABIS USE DISORDER: Status: ACTIVE | Noted: 2022-10-10

## 2022-10-10 PROBLEM — D72.829 LEUKOCYTOSIS: Status: ACTIVE | Noted: 2022-10-10

## 2022-10-10 LAB
EKG ATRIAL RATE: 116 BPM
EKG DIAGNOSIS: NORMAL
EKG P AXIS: 47 DEGREES
EKG P-R INTERVAL: 140 MS
EKG Q-T INTERVAL: 348 MS
EKG QRS DURATION: 108 MS
EKG QTC CALCULATION (BAZETT): 483 MS
EKG R AXIS: -3 DEGREES
EKG T AXIS: -3 DEGREES
EKG VENTRICULAR RATE: 116 BPM
URINE CULTURE, ROUTINE: NORMAL

## 2022-10-10 PROCEDURE — 6370000000 HC RX 637 (ALT 250 FOR IP): Performed by: PSYCHIATRY & NEUROLOGY

## 2022-10-10 PROCEDURE — 99222 1ST HOSP IP/OBS MODERATE 55: CPT | Performed by: NURSE PRACTITIONER

## 2022-10-10 PROCEDURE — 93010 ELECTROCARDIOGRAM REPORT: CPT | Performed by: INTERNAL MEDICINE

## 2022-10-10 PROCEDURE — 1240000000 HC EMOTIONAL WELLNESS R&B

## 2022-10-10 PROCEDURE — 99223 1ST HOSP IP/OBS HIGH 75: CPT | Performed by: PSYCHIATRY & NEUROLOGY

## 2022-10-10 PROCEDURE — 6370000000 HC RX 637 (ALT 250 FOR IP)

## 2022-10-10 RX ORDER — ARIPIPRAZOLE 10 MG/1
5 TABLET ORAL DAILY
Status: DISCONTINUED | OUTPATIENT
Start: 2022-10-10 | End: 2022-10-13 | Stop reason: HOSPADM

## 2022-10-10 RX ADMIN — ARIPIPRAZOLE 5 MG: 10 TABLET ORAL at 17:49

## 2022-10-10 RX ADMIN — ACETAMINOPHEN 650 MG: 325 TABLET ORAL at 15:49

## 2022-10-10 RX ADMIN — HYDROXYZINE HYDROCHLORIDE 50 MG: 50 TABLET, FILM COATED ORAL at 16:03

## 2022-10-10 ASSESSMENT — PAIN SCALES - WONG BAKER: WONGBAKER_NUMERICALRESPONSE: 0

## 2022-10-10 ASSESSMENT — PAIN SCALES - GENERAL: PAINLEVEL_OUTOF10: 0

## 2022-10-10 ASSESSMENT — PAIN DESCRIPTION - DESCRIPTORS: DESCRIPTORS: ACHING

## 2022-10-10 ASSESSMENT — PAIN DESCRIPTION - LOCATION: LOCATION: HEAD

## 2022-10-10 NOTE — GROUP NOTE
Group Therapy Note    Date: 10/10/2022    Group Start Time: 1100  Group End Time: 3939  Group Topic: Psychoeducation    [facility]    SUMANTH Tong        Group Therapy Note  Clinician introduced the CBT triangle and cognative restructuring and the group members practiced changing negative thoughts into positive statements. Attendees: 12       Patient's Goal:      Notes:  Patient was cooperative and fully engaged. She participated in the group discussion and activity. She shared negative thoughts that the other group members helped her turn into positive statements. Status After Intervention:  Improved    Participation Level:  Active Listener and Interactive    Participation Quality: Appropriate      Speech:  normal      Thought Process/Content: Linear      Affective Functioning: Congruent      Mood: depressed      Level of consciousness:  Attentive      Response to Learning: Able to retain information      Endings: None Reported    Modes of Intervention: Education, Support, and Activity      Discipline Responsible: /Counselor      Signature:  SUMANTH Tong

## 2022-10-10 NOTE — PLAN OF CARE
Patient was out with patient group briefly, early in the shift & was social, with roommate. Patient reported not wanting to spend a lot of time on the larger side. \"There are a lot of boys overt here. \" Patient was pleasant & verbal, during 1:1. Patient stated that she was here because she tried to kill herself, by taking 6 trazodone. \"I'm not sure, if I'm glad that I didn't die. I was real sad & didn't want to live anymore & deal with the feelings. \" Patient denied feelings of self harm, during 1:1 & was able to contract for safety.

## 2022-10-10 NOTE — PROGRESS NOTES
Group Therapy Note    Date: 10/10/2022  Start Time: 1300  End Time:  1400  Number of Participants: 8    Type of Group: Music Group    Notes:  Pt present for Music Group. Pt participated by making song selections. Participation Level:  Active Listener and Interactive    Participation Quality: Appropriate and Attentive      Speech:  normal      Affective Functioning: Congruent      Endings: None Reported    Modes of Intervention: Support, Socialization, Activity, and Media      Discipline Responsible: Chaplain Allyssa Diaz       10/10/22 1539   Encounter Summary   Encounter Overview/Reason  Behavioral Health   Service Provided For: Patient   Last Encounter    (10/10 Music Group)   Complexity of Encounter Moderate   Begin Time 1300   End Time  1400   Total Time Calculated 60 min   Behavioral Health    Type  Spirituality Group

## 2022-10-10 NOTE — PLAN OF CARE
Pt out visible and social with select peers, attends groups and community meeting. Pt states she is feeling a little better and is still sad but will contract for safety.

## 2022-10-10 NOTE — BH NOTE
Noted pt coming back down macias crying loudly when asked what happen pt states her mother gave her a wrong # to call and that she has done this before. \"The only time she calls me is when she wants money\". Pt given TLC and medicated per order.

## 2022-10-10 NOTE — H&P
Hospital Medicine History & Physical      PCP: No PCP    Date of Admission: 10/9/2022    Date of Service: Pt seen/examined on 10/10/22     Chief Complaint:  SI        History Of Present Illness: The patient is a 19 y.o. female with PMH of depression who presented to [facility] for suicide attempt. Patient was seen and evaluated in the ED by the ED medical provider, patient was medically cleared for admission to [facility] at [facility]. This note serves as an admission medical H&P. Tobacco use: denies  ETOH use: occasionally   Illicit drug use: marijuana occasionally     Patient denies any medical complaints     Past Medical History:        Diagnosis Date    Depression        Past Surgical History:    No past surgical history on file. Medications Prior to Admission:    Prior to Admission medications    Medication Sig Start Date End Date Taking? Authorizing Provider   traZODone (DESYREL) 50 MG tablet Take 50 mg by mouth nightly as needed for Sleep  Patient not taking: Reported on 10/9/2022   Yes Historical Provider, MD   hydrOXYzine (VISTARIL) 25 MG capsule Take 1 capsule by mouth daily as needed for Anxiety  Patient not taking: Reported on 10/9/2022 8/23/21   Jessica Lopez MD   lamoTRIgine (LAMICTAL) 200 MG tablet Take 1 tablet by mouth daily  Patient not taking: Reported on 10/9/2022 8/24/21   Jessica Lopez MD   ARIPiprazole (ABILIFY) 10 MG tablet Take 1 tablet by mouth daily  Patient not taking: Reported on 10/9/2022 8/23/21   Jessica Lopez MD       Allergies:  Coconut fatty acids, Macadamia nut oil, Nutritional supplements, and Pineapple extract    Social History:  The patient currently lives home     TOBACCO:   reports that she has never smoked. She has never used smokeless tobacco.  ETOH:   reports no history of alcohol use.       Family History:   Positive as follows:        Problem Relation Age of Onset    Mental Illness Mother     No Known Problems Sister        REVIEW OF SYSTEMS: Constitutional: Negative for fever   HENT: Negative for sore throat   Eyes: Negative for redness   Respiratory: Negative  for dyspnea, cough   Cardiovascular: Negative for chest pain   Gastrointestinal: Negative for vomiting, diarrhea   Genitourinary: Negative for hematuria   Musculoskeletal: Negative for arthralgias   Skin: Negative for rash   Neurological: Negative for syncope    Hematological: Negative for easy bruising/bleeding   Psychiatric/Behavorial: Per psychiatry team evaluation     PHYSICAL EXAM:    /87   Pulse 95   Temp 98.2 °F (36.8 °C) (Oral)   Resp 16   Ht 5' 6\" (1.676 m)   Wt 256 lb (116.1 kg)   SpO2 98%   BMI 41.32 kg/m²     Gen: No distress. Alert. Eyes: PERRL. No sclera icterus. No conjunctival injection. ENT: No discharge. Pharynx clear. Neck: No JVD. No Carotid Bruit. Trachea midline. Resp: No accessory muscle use. No crackles. No wheezes. No rhonchi. CV: Regular rate. Regular rhythm. No murmur. No rub. No edema. GI: Non-tender. Non-distended. Normal bowel sounds. Skin: Warm and dry. No nodule on exposed extremities. No rash on exposed extremities. M/S: No cyanosis. No joint deformity. No clubbing. Neuro: Awake. No focal neurologic deficit on exam.  Cranial nerves II through XII intact. Patient is able to ambulate without difficulty. Psych: Per psychiatry team evaluation     CBC:   Recent Labs     10/08/22  2342   WBC 12.6*   HGB 12.2   HCT 39.5   MCV 90.1        BMP:   Recent Labs     10/08/22  2342   *   K 3.9      CO2 23   BUN 15   CREATININE 0.8     LIVER PROFILE: No results for input(s): AST, ALT, LIPASE, BILIDIR, BILITOT, ALKPHOS in the last 72 hours. Invalid input(s): AMYLASE,  ALB  PT/INR: No results for input(s): PROTIME, INR in the last 72 hours. APTT: No results for input(s): APTT in the last 72 hours.   UA:  Recent Labs     10/08/22  2329   COLORU DARK YELLOW*   PHUR 5.5  5.5   WBCUA 35*   RBCUA 2   BACTERIA 4+*   CLARITYU TURBID*   SPECGRAV >=1.030   LEUKOCYTESUR SMALL*   UROBILINOGEN 1.0   BILIRUBINUR Negative   BLOODU Negative   GLUCOSEU Negative        U/A:    Lab Results   Component Value Date/Time    COLORU DARK YELLOW 10/08/2022 11:29 PM    WBCUA 35 10/08/2022 11:29 PM    RBCUA 2 10/08/2022 11:29 PM    BACTERIA 4+ 10/08/2022 11:29 PM    CLARITYU TURBID 10/08/2022 11:29 PM    SPECGRAV >=1.030 10/08/2022 11:29 PM    LEUKOCYTESUR SMALL 10/08/2022 11:29 PM    BLOODU Negative 10/08/2022 11:29 PM    GLUCOSEU Negative 10/08/2022 11:29 PM       CULTURES     Latest Reference Range & Units 10/8/22 23:42   SARS-CoV-2, NAAT Not Detected  Not Detected       RADIOLOGY  No orders to display         Pertinent previous results reviewed   N/A     ASSESSMENT/PLAN:  SI  Depression   - per psychiatry team      Leukocytosis  - likely reactive  - denies any cough, fever, chills, urinary symptoms      Abnormal urinalysis  - urinalysis as above  - started on Keflex in ED  - denies any s/sx  - urine culture showed >50,000 mixed nava  - no treatment needed    Elevated blood pressure  - noted on admission--improved today   - continue to monitor  - not on any home medication    Cannabis Abuse  - UDS +  - counseled cessation     Morbid Obesity  - Body mass index is 41.32 kg/m². - weight loss encouraged     Pt has no medical complaints at this time. They were informed that should a medical concern arise during their admission they may have [facility] contact us.         Mini Orozco, NIKUNJ - CNP   10/10/2022 2:50 PM

## 2022-10-10 NOTE — PROGRESS NOTES
Behavioral Services  Medicare Certification Upon Admission    I certify that this patient's inpatient psychiatric hospital admission is medically necessary for:    [x] (1) Treatment which could reasonably be expected to improve this patient's condition,       [x] (2) Or for diagnostic study;     AND     [x](2) The inpatient psychiatric services are provided while the individual is under the care of a physician and are included in the individualized plan of care.     Estimated length of stay/service 5 d    Plan for post-hospital care outpt    Electronically signed by Jitendra Sheffield MD on 10/10/2022 at 4:17 PM

## 2022-10-10 NOTE — GROUP NOTE
Group Therapy Note    Date: 10/10/2022    Group Start Time: 1000  Group End Time: 5533  Group Topic: Cognitive Skills    [address] [facility]        Group Therapy Note    Attendees: 15    Group members engaged in the exercise \"Creating Connections. \" Group members were given a ball of yarn and were given various prompts. With the yarn, group members created a spider web that was known as the connection. The goal of the group was to show people the similarities and differences of one another but they can still be connected even with differences. Notes:  Rea Lopez attended group for the full duration. Rea Lopez remained engaged and interacted appropriately with other members of the group. Status After Intervention:  Improved    Participation Level:  Active Listener and Interactive    Participation Quality: Appropriate, Attentive, and Sharing      Speech:  normal      Thought Process/Content: Logical      Affective Functioning: Congruent      Mood: euthymic      Level of consciousness:  Alert, Oriented x4, and Attentive      Response to Learning: Able to verbalize current knowledge/experience      Endings: None Reported    Modes of Intervention: Socialization, Exploration, and Activity      Discipline Responsible: Be[employer] Tech      Signature:  SUMANTH Encarnacion

## 2022-10-11 LAB
CHOLESTEROL, TOTAL: 180 MG/DL (ref 0–199)
ESTIMATED AVERAGE GLUCOSE: 105.4 MG/DL
HBA1C MFR BLD: 5.3 %
HDLC SERPL-MCNC: 43 MG/DL (ref 40–60)
LDL CHOLESTEROL CALCULATED: 114 MG/DL
TRIGL SERPL-MCNC: 113 MG/DL (ref 0–150)
VLDLC SERPL CALC-MCNC: 23 MG/DL

## 2022-10-11 PROCEDURE — 6370000000 HC RX 637 (ALT 250 FOR IP): Performed by: PSYCHIATRY & NEUROLOGY

## 2022-10-11 PROCEDURE — 1240000000 HC EMOTIONAL WELLNESS R&B

## 2022-10-11 PROCEDURE — 99233 SBSQ HOSP IP/OBS HIGH 50: CPT | Performed by: PSYCHIATRY & NEUROLOGY

## 2022-10-11 RX ADMIN — ARIPIPRAZOLE 5 MG: 10 TABLET ORAL at 09:19

## 2022-10-11 NOTE — GROUP NOTE
Group Therapy Note    Date: 10/11/2022    Group Start Time: 1100  Group End Time: 1200  Group Topic: Psychoeducation    [address] [facility],[address], [place]        Group Therapy Note    Attendees: 7    Participants learned what anxiety is, the symptoms of anxiety as well as the symptoms of panic attacks. The group then practiced grounding techniques to reduce anxiety. Notes:  Laura Cuellar attended group and was engaged in learning about anxiety symptoms and grounding techniques used to reduce anxiety. Laura Cuellar participated in the grounding exercises and she was pleasant throughout group.      Status After Intervention:  Improved    Participation Level: Interactive    Participation Quality: Appropriate, Attentive, and Sharing      Speech:  normal      Thought Process/Content: Logical      Affective Functioning: Congruent      Mood: euthymic      Level of consciousness:  Alert, Oriented x4, and Attentive      Response to Learning: Able to verbalize/acknowledge new learning, Capable of insight, and Progressing to goal      Endings: None Reported    Modes of Intervention: Education and Activity      Discipline Responsible: /Counselor      Signature:  SARAH Ramos

## 2022-10-11 NOTE — H&P
[address]                 [address] [address]                              HISTORY AND PHYSICAL    PATIENT NAME: Roxana Guzman                :        2000  MED REC NO:   [id number]                          ROOM:       4615  ACCOUNT NO:   [de-identified]                           ADMIT DATE: 10/09/2022  PROVIDER:     Laura Mckoy. Werner Mckeon MD    DATE OF EVALUATION:  10/10/2022    CHIEF COMPLAINT:  Suicidality. HISTORY OF PRESENT ILLNESS:  The patient is a 22-year-old, who was  hospitalized at [facility] in 2021. She presented to the ED at [facility] on  via police after she had taken approximately six  of her trazodone tablets in a suicide attempt. She stated that she was  trying to kill herself and that she was upset with her mom over talk  about her sister, who is in a group home in [place]. Apparently, her  sister is 8years of age and is currently in a group home for the last  six months in [place]. She stated that she overdosed on trazodone  because that is all she had at that time. If she had more, she would  have taken more. She stated that sister apparently talks about killing  herself and others and is unable to be safe in the home. The patient  states that she is trying to get custody of her sister. She stated that  she told her mom about how she felt and she stated that her mother did  not care. She stated that if that had not worked, she was going to try  to overdose again. She states that her sleep and appetite have been  relatively intact. PRIOR PSYCHIATRIC HISTORY:  [facility] 2021, at that time was  admitted with suicidal ideation. Outpatient is none currently, after  she stopped seeing a therapist in the past.  Would like to see a  therapist going forward. FAMILY PSYCHIATRIC HISTORY:  Mother with bipolar. Grandmother with  anxiety. Great grandmother with anxiety.     DRUG AND ALCOHOL USE: five days. Spent approximately 70 minutes on this eval with more than 50% of the  time discussing patient care and treatment options.         Kya Stock MD    D: 10/10/2022 16:53:40       T: 10/10/2022 16:57:14     [email or web address]  Job#: [id number]     Doc#: [id number]    CC:

## 2022-10-11 NOTE — PROGRESS NOTES
Pt up ad ricarda. Sociable with her roommate. Attending morning groups. Ate well. Denies anxiety, states depression is getting better, she reports less sadness. Monitored for safety and comfort.

## 2022-10-11 NOTE — GROUP NOTE
Group Therapy Note    Date: 10/11/2022    Group Start Time: 1600  Group End Time: 1700  Group Topic: Healthy Living/Wellness    [address] [facility]    Parker Calzada RN        Group Note Attendees:       Patient's Goal:  social interaction and communication     Notes:  participated and was kind to everyone    Status After Intervention:  Improved    Participation Level:  Active Listener and Interactive    Participation Quality: Appropriate, Attentive, and Supportive      Speech:  normal      Thought Process/Content: Linear      Affective Functioning: Congruent      Mood: euthymic      Level of consciousness:  Alert      Response to Learning: Able to retain information      Endings: None Reported    Modes of Intervention: Activity      Discipline Responsible: Registered Nurse      Signature:  Parker Calzada RN

## 2022-10-11 NOTE — PLAN OF CARE
Patient alert and oriented x 3. Patient rates Depression 8/10 and Anxiety 4/10. Patient seclusive to her bed and room this evening. Patient denies SI/HI/A/V/H. Patient states, \"I was seeing bugs in the tables earlier but denies visual hallucinations @ present. \" Patient doesn't have HS medications scheduled. Patient denies self harm and contracts for safety. No c/o's voiced @ present.

## 2022-10-11 NOTE — PROGRESS NOTES
Department of Psychiatry  AttendingProgress Note  Chief Complaint: Suicidality    Today Ms. Curiel reports she is feeling better. Yesterday she had a phone call with her mother that was not good. Her mom lives in [place], [place] with a boyfriend and the mom always asks for money, and seems to never be consistent with staying in contact with the her. The pt stated she always is worried about her little sister who is in foster care. She doesn't know where the sister is located and hasn't spoke to her in  a long time. Denies being anxious. Patient's chart was reviewed and collaborated with  about the treatment plan. SUBJECTIVE:    Patient is feeling better. Suicidal ideation:  denies suicidal ideation. Patient does not have medication side effects. ROS: Patient has new complaints: no  Sleeping adequately:  Yes   Appetite adequate: Yes  Attending groups: Yes  Visitors:Yes    OBJECTIVE    Physical  VITALS:  /88   Pulse (!) 110   Temp 97.3 °F (36.3 °C) (Oral)   Resp 18   Ht 5' 6\" (1.676 m)   Wt 256 lb (116.1 kg)   SpO2 100%   BMI 41.32 kg/m²     Mental Status Examination:  Patients appearance was well-appearing. Thoughts are Logical. Homicidal ideations none. No abnormal movements, tics or mannerisms. Memory intact Aims 0. Concentration Good. Alert and oriented X 4. Insight and Judgement normal insight and judgment. Patient was cooperative.  Patient gait normal. Mood constricted and depressed, affect flat affect Hallucinations Absent, suicidal ideations no specific plan to harm self Speech normal pitch and normal volume  Data  Labs:   Admission on 10/09/2022   Component Date Value Ref Range Status    TSH 10/09/2022 2.35  0.27 - 4.20 uIU/mL Final    Cholesterol, Total 10/09/2022 180  0 - 199 mg/dL Final    Triglycerides 10/09/2022 113  0 - 150 mg/dL Final    HDL 10/09/2022 43  40 - 60 mg/dL Final    Comment: An HDL cholesterol less than 40 mg/dL is low and  constitutes a coronary heart disease risk factor. An HDL cholesterol greater than 60 mg/dL is a  negative risk factor for coronary heart disease. LDL Calculated 10/09/2022 114 (A)  <100 mg/dL Final    VLDL Cholesterol Calculated 10/09/2022 23  Not Established mg/dL Final    Hemoglobin A1C 10/09/2022 5.3  See comment % Final    Comment: Comment:  Diagnosis of Diabetes: > or = 6.5%  Increased risk of diabetes (Prediabetes): 5.7-6.4%  Glycemic Control: Nonpregnant Adults: <7.0%                    Pregnant: <6.0%        eAG 10/09/2022 105.4  mg/dL Final            Medications  Current Facility-Administered Medications: ARIPiprazole (ABILIFY) tablet 5 mg, 5 mg, Oral, Daily  acetaminophen (TYLENOL) tablet 650 mg, 650 mg, Oral, Q4H PRN  magnesium hydroxide (MILK OF MAGNESIA) 400 MG/5ML suspension 30 mL, 30 mL, Oral, Daily PRN  aluminum & magnesium hydroxide-simethicone (MAALOX) 200-200-20 MG/5ML suspension 30 mL, 30 mL, Oral, Q6H PRN  hydrOXYzine HCl (ATARAX) tablet 50 mg, 50 mg, Oral, TID PRN  OLANZapine (ZYPREXA) tablet 5 mg, 5 mg, Oral, Q4H PRN **OR** OLANZapine (ZYPREXA) 10 mg in sterile water 2 mL injection, 10 mg, IntraMUSCular, Q4H PRN  diphenhydrAMINE (BENADRYL) injection 50 mg, 50 mg, IntraMUSCular, Q4H PRN  melatonin tablet 3 mg, 3 mg, Oral, Nightly PRN    ASSESSMENT AND PLAN    Principal Problem:    Bipolar disorder with severe depression ([address]  Active Problems:    Cannabis use disorder    Leukocytosis    Elevated BP without diagnosis of hypertension    Morbid obesity ([address]  Resolved Problems:    Major depressive disorder with current active episode, unspecified depression episode severity, unspecified whether recurrent    Major depressive disorder, recurrent ([address]       1. Patient s symptoms   are improving  2. Probable discharge is next week  3. Discharge planning is complete  4. Suicidal ideation is better  5.  Total time with patient was 40 minutes and more than 50 % of that time was spent counseling the patient on their symptoms, treatment and expected goals. Addendum to PA student note:  Pt seen, examined, and evaluated with PA student, Rima Narvaez, who acted as my scribe for the above documentation. I have reviewed the current history, physical findings, labs, assessment and plan; and agree with note as documented.      Celestina Estrada MD  Physician Psychiatry

## 2022-10-11 NOTE — GROUP NOTE
Group Therapy Note    Date: 10/10/2022    Group Start Time: 2100  Group End Time: 2140  Group Topic: Wrap-Up    Priyanka Nieto        Group Therapy Note    Attendees: 12/21       Patient's Goal:  \"To be more productive\"    Notes:  Patient states she was able to meet goal today. Status After Intervention:  Improved    Participation Level:  Active Listener and Interactive    Participation Quality: Appropriate, Attentive, Sharing, and Supportive      Speech:  normal      Thought Process/Content: Logical      Affective Functioning: Congruent      Mood: anxious      Level of consciousness:  Oriented x4      Response to Learning: Able to verbalize current knowledge/experience, Able to verbalize/acknowledge new learning, Able to retain information, and Capable of insight      Endings: None Reported    Modes of Intervention: Education, Support, Socialization, and Exploration      Discipline Responsible: [address], [employer] Tech      Signature:  [facility]

## 2022-10-12 PROCEDURE — 1240000000 HC EMOTIONAL WELLNESS R&B

## 2022-10-12 PROCEDURE — 6370000000 HC RX 637 (ALT 250 FOR IP)

## 2022-10-12 PROCEDURE — 6370000000 HC RX 637 (ALT 250 FOR IP): Performed by: PSYCHIATRY & NEUROLOGY

## 2022-10-12 PROCEDURE — 99233 SBSQ HOSP IP/OBS HIGH 50: CPT | Performed by: PSYCHIATRY & NEUROLOGY

## 2022-10-12 RX ADMIN — Medication 3 MG: at 21:39

## 2022-10-12 RX ADMIN — ARIPIPRAZOLE 5 MG: 10 TABLET ORAL at 08:39

## 2022-10-12 RX ADMIN — Medication 3 MG: at 02:21

## 2022-10-12 RX ADMIN — HYDROXYZINE HYDROCHLORIDE 50 MG: 50 TABLET, FILM COATED ORAL at 13:49

## 2022-10-12 NOTE — GROUP NOTE
Group Therapy Note    Date: 10/11/2022    Group Start Time: 2020  Group End Time: 2045  Group Topic: Wrap-Up    Priyanka Nieto        Group Therapy Note    Attendees: 11/19       Patient's Goal:  \"Learn to be okay to be by myself\"    Notes:  Patient states she was able to meet her goal today. Status After Intervention:  Improved    Participation Level:  Active Listener and Interactive    Participation Quality: Appropriate, Attentive, Sharing, and Supportive      Speech:  normal      Thought Process/Content: Logical      Affective Functioning: Congruent and Flat      Mood: anxious      Level of consciousness:  Alert and Oriented x4      Response to Learning: Able to verbalize current knowledge/experience, Able to verbalize/acknowledge new learning, Able to retain information, and Capable of insight      Endings: None Reported    Modes of Intervention: Education, Support, Socialization, and Exploration      Discipline Responsible: [address], [employer] Tech      Signature:  [facility]

## 2022-10-12 NOTE — PROGRESS NOTES
Department of Psychiatry  AttendingProgress Note  Chief Complaint: suicidal     Today Ms. Curiel reports improvement in her mood. Pt stated she is feeling better than yesterday. Attended group this AM and make 3 \"smart goals\". The goals were to set boundaries with her mother, to not lend her money anymore and demand for more consistency with their relationship. Another goal the pt has is to resume seeing her counselor at [facility] every other week and to be compliant with taking her medication daily upon discharge. Denies suicidal thoughts, the pt stated the last time she had thoughts of suicide was the first day of admission. Patient's chart was reviewed and collaborated with  about the treatment plan. SUBJECTIVE:    Patient is feeling better. Suicidal ideation:  denies suicidal ideation. Patient does not have medication side effects. ROS: Patient has new complaints: no  Sleeping adequately:  Yes. Took melatonin last night which has improve quality of sleep. Appetite adequate: Yes  Attending groups: Yes  Visitors:Yes    OBJECTIVE    Physical  VITALS:  BP (!) 108/54   Pulse 96   Temp 98.2 °F (36.8 °C) (Temporal)   Resp 16   Ht 5' 6\" (1.676 m)   Wt 256 lb (116.1 kg)   SpO2 98%   BMI 41.32 kg/m²     Mental Status Examination:  Patients appearance was well-appearing. Thoughts are  organized, clear and logical . Homicidal ideations none. No abnormal movements, tics or mannerisms. Memory intact Aims 0. Concentration Good. Alert and oriented X 4. Insight and Judgement normal insight and judgment. Patient was cooperative.  Patient gait normal. Mood within normal limits, affect normal affect Hallucinations Absent, suicidal ideations no specific plan to harm self Speech normal pitch and normal volume  Data  Labs:   Admission on 10/09/2022   Component Date Value Ref Range Status    TSH 10/09/2022 2.35  0.27 - 4.20 uIU/mL Final    Cholesterol, Total 10/09/2022 180  0 - 199 mg/dL Final    Triglycerides 10/09/2022 113  0 - 150 mg/dL Final    HDL 10/09/2022 43  40 - 60 mg/dL Final    Comment: An HDL cholesterol less than 40 mg/dL is low and  constitutes a coronary heart disease risk factor. An HDL cholesterol greater than 60 mg/dL is a  negative risk factor for coronary heart disease. LDL Calculated 10/09/2022 114 (A)  <100 mg/dL Final    VLDL Cholesterol Calculated 10/09/2022 23  Not Established mg/dL Final    Hemoglobin A1C 10/09/2022 5.3  See comment % Final    Comment: Comment:  Diagnosis of Diabetes: > or = 6.5%  Increased risk of diabetes (Prediabetes): 5.7-6.4%  Glycemic Control: Nonpregnant Adults: <7.0%                    Pregnant: <6.0%        eAG 10/09/2022 105.4  mg/dL Final            Medications  Current Facility-Administered Medications: ARIPiprazole (ABILIFY) tablet 5 mg, 5 mg, Oral, Daily  acetaminophen (TYLENOL) tablet 650 mg, 650 mg, Oral, Q4H PRN  magnesium hydroxide (MILK OF MAGNESIA) 400 MG/5ML suspension 30 mL, 30 mL, Oral, Daily PRN  aluminum & magnesium hydroxide-simethicone (MAALOX) 200-200-20 MG/5ML suspension 30 mL, 30 mL, Oral, Q6H PRN  hydrOXYzine HCl (ATARAX) tablet 50 mg, 50 mg, Oral, TID PRN  OLANZapine (ZYPREXA) tablet 5 mg, 5 mg, Oral, Q4H PRN **OR** OLANZapine (ZYPREXA) 10 mg in sterile water 2 mL injection, 10 mg, IntraMUSCular, Q4H PRN  diphenhydrAMINE (BENADRYL) injection 50 mg, 50 mg, IntraMUSCular, Q4H PRN  melatonin tablet 3 mg, 3 mg, Oral, Nightly PRN    ASSESSMENT AND PLAN    Principal Problem:    Bipolar disorder with severe depression ([facility]  Active Problems:    Cannabis use disorder    Leukocytosis    Elevated BP without diagnosis of hypertension    Morbid obesity ([facility]  Resolved Problems:    Major depressive disorder with current active episode, unspecified depression episode severity, unspecified whether recurrent    Major depressive disorder, recurrent ([facility] [address]       1. Patient s symptoms   are improving  2. Probable discharge is next week  3. Discharge planning is complete  4. Suicidal ideation is better  5. Total time with patient was 40 minutes and more than 50 % of that time was spent counseling the patient on their symptoms, treatment and expected goals. Addendum to PA student note:  Pt seen, examined, and evaluated with PA student, Adam Lefort, who acted as my scribe for the above documentation. I have reviewed the current history, physical findings, labs, assessment and plan; and agree with note as documented.      Torie Polo MD  Physician Psychiatry

## 2022-10-12 NOTE — PLAN OF CARE
Out of her bed most of the day, attends groups, community meeting. Social with staff and peers, laughing and playing board games with peers. Pt denies SI,HI,AVH, pt contracts for safety, no distress noted at this time will continue to monitor.

## 2022-10-12 NOTE — PROGRESS NOTES
Lucia Currie is resting in her bed with eyes closed. No obvious signs of distress. Respirations even and easy. Melatonin noted to be effective.

## 2022-10-12 NOTE — GROUP NOTE
Group Therapy Note    Date: 10/12/2022    Group Start Time: 1100  Group End Time: 3973  Group Topic: Cognitive Skills    [id number] [facility]        Group Therapy Note    Attendees: 10    Group members were asked to trace their hands. On the inside of the hand, the group members were asked to write things that they can control and on the outside of the hand, patients wrote things they couldn't control. When finished, group members shared their hands. After the activity, group members shared 5 things that they are in control of at this very moment. The goal of group was to help manage stress. Group members can Identify what they can and can't control in stressful situations and increase distress tolerance by learning to let go of things they can't control and increasing self empowerment by controlling what they can. Notes:  Amaris Costello attended group for the full duration. Amaris Costello remained engaged and interacted appropriately with other members of the group. Status After Intervention:  Improved    Participation Level:  Active Listener and Interactive    Participation Quality: Appropriate, Attentive, and Sharing      Speech:  normal      Thought Process/Content: Logical      Affective Functioning: Congruent      Mood: euthymic      Level of consciousness:  Alert, Oriented x4, and Attentive      Response to Learning: Able to verbalize current knowledge/experience      Endings: None Reported    Modes of Intervention: Socialization, Exploration, and Problem-solving      Discipline Responsible: Behavorial Health Tech      Signature:  SUMANTH Singh

## 2022-10-12 NOTE — PROGRESS NOTES
Gerson Tadeo advised she was having trouble sleeping and had been \"tossing and turning for a bit\". Melatonin was administered per order. See MAR.

## 2022-10-12 NOTE — GROUP NOTE
Group Therapy Note    Date: 10/12/2022    Group Start Time: 1000  Group End Time: 8634  Group Topic: Psychoeducation    [facility]    SUMANTH Cunningham        Group Therapy Note  Clinician introduced SMART goals to the group members. The clinician provided handouts to the group members. After the clinician introduce the topic, group members took turns practicing smart goals with the group members providing input. The group members decided to work on [employer] goal of removing the words Can't, Won't, Try and Maybe out of their vocabulary. They all obtained a journal and will zana every time the recognize the words coming out of their mouths. They are planning on holding each other accountable and will let other's know if they hear them speak to words. They will practice for the duration of their stay. Attendees: 11       Patient's Goal:      Notes:  Patient was cooperative and fully engaged in the group discussion and activity. Patient shared her thoughts and provided supportive statements to other. She participated in the group activity of practicing other members smart goals. Status After Intervention:  Improved    Participation Level:  Active Listener and Interactive    Participation Quality: Appropriate      Speech:  normal      Thought Process/Content: Logical      Affective Functioning: Congruent      Mood: depressed      Level of consciousness:  Attentive      Response to Learning: Able to retain information      Endings: None Reported    Modes of Intervention: Education, Support, Socialization, and Activity      Discipline Responsible: /Counselor      Signature:  SUMANTH Cunningham

## 2022-10-12 NOTE — PLAN OF CARE
+groups; no scheduled meds - no PRNs requested; selectively social. CFS. Denies all. Problem: Self Harm/Suicidality  Goal: Will have no self-injury during hospital stay  Description: INTERVENTIONS:  1. Q 30 MINUTES: Routine safety checks  2. Q SHIFT & PRN: Assess risk to determine if routine checks are adequate to maintain patient safety  Outcome: Progressing     Problem: Depression  Goal: Will be euthymic at discharge  Description: INTERVENTIONS:  1. Administer medication as ordered  2. Provide emotional support via 1:1 interaction with staff  3. Encourage involvement in milieu/groups/activities  4.  Monitor for social isolation  Outcome: Progressing

## 2022-10-13 VITALS
TEMPERATURE: 97.8 F | WEIGHT: 256 LBS | HEIGHT: 66 IN | OXYGEN SATURATION: 98 % | HEART RATE: 91 BPM | DIASTOLIC BLOOD PRESSURE: 91 MMHG | BODY MASS INDEX: 41.14 KG/M2 | RESPIRATION RATE: 16 BRPM | SYSTOLIC BLOOD PRESSURE: 141 MMHG

## 2022-10-13 PROCEDURE — 5130000000 HC BRIDGE APPOINTMENT

## 2022-10-13 PROCEDURE — 6370000000 HC RX 637 (ALT 250 FOR IP): Performed by: PSYCHIATRY & NEUROLOGY

## 2022-10-13 PROCEDURE — 99239 HOSP IP/OBS DSCHRG MGMT >30: CPT | Performed by: PSYCHIATRY & NEUROLOGY

## 2022-10-13 RX ORDER — ARIPIPRAZOLE 5 MG/1
5 TABLET ORAL DAILY
Qty: 30 TABLET | Refills: 0 | Status: SHIPPED | OUTPATIENT
Start: 2022-10-14

## 2022-10-13 RX ADMIN — ARIPIPRAZOLE 5 MG: 10 TABLET ORAL at 09:13

## 2022-10-13 NOTE — PLAN OF CARE
Social & visible this evening. Attended group. Didn't have any scheduled medications, but requested PRN melatonin & it was effective. No complaints voiced. Denies SI, HI, & AVH. Problem: Self Harm/Suicidality  Goal: Will have no self-injury during hospital stay  Description: INTERVENTIONS:  1. Q 30 MINUTES: Routine safety checks  2. Q SHIFT & PRN: Assess risk to determine if routine checks are adequate to maintain patient safety  10/13/2022 0208 by Marya Mary RN  Outcome: Progressing     Problem: Depression  Goal: Will be euthymic at discharge  Description: INTERVENTIONS:  1. Administer medication as ordered  2. Provide emotional support via 1:1 interaction with staff  3. Encourage involvement in milieu/groups/activities  4.  Monitor for social isolation  10/13/2022 0208 by Marya Mary RN  Outcome: Progressing

## 2022-10-13 NOTE — GROUP NOTE
Group Therapy Note    Date: 10/12/2022    Group Start Time: 2045  Group End Time: 2110  Group Topic: Wrap-Up    Priyanka Nieto        Group Therapy Note    Attendees: 11/22       Patient's Goal:  \"Call mom and set healthy boundaries\"     Notes:  Patient stated that she was not able to meet her goal because her mom would not return her calls. Patient stated she was able to have a good day despite this. Status After Intervention:  Improved    Participation Level:  Active Listener and Interactive    Participation Quality: Appropriate, Attentive, Sharing, and Supportive      Speech:  normal      Thought Process/Content: Logical  Linear      Affective Functioning: Congruent      Mood: depressed      Level of consciousness:  Alert and Oriented x4      Response to Learning: Able to verbalize current knowledge/experience, Able to verbalize/acknowledge new learning, and Able to retain information      Endings: None Reported    Modes of Intervention: Education, Support, Socialization, and Exploration      Discipline Responsible: [address], [employer] [address] Tech      Signature:  [facility]

## 2022-10-13 NOTE — DISCHARGE SUMMARY
Discharge Summary   Admit Date: 10/9/2022   Discharge Date:    10/13/2022  Condition at DC stable  Spent over 40 minutes with patient and staff on [facility] with more than 50 % of time spent with patient discussing care  Final Dx: axis I:   Bipolar disorder with severe depression ([address]     Axis 2: No diagnosis  Vandana 3: See Medical History    And Present on Admission:   (Resolved) Major depressive disorder with current active episode, unspecified depression episode severity, unspecified whether recurrent   (Resolved) Major depressive disorder, recurrent ([address]   Morbid obesity ([address]   Elevated BP without diagnosis of hypertension   Cannabis use disorder   Leukocytosis   Bipolar disorder with severe depression ([address]     Axis 4: Problems related to the social environment  Axis 5:  On Admission: 41-50 serious symptoms At Discharge: 61-70 mild symptoms   All conditions on Axis 1 and Axis 2 and active problems on Axis 3 were treated while patient was hospitalized. [facility] Problems    Diagnosis Date Noted    Cannabis use disorder [F12.90] 10/10/2022     Priority: Medium    Leukocytosis [D72.829] 10/10/2022     Priority: Medium    Morbid obesity ([address] [E66.01]     Elevated BP without diagnosis of hypertension [R03.0]     Bipolar disorder with severe depression ([address] [F31.4]    )   Condition on DC  Mood and affect are stable and pt is not suicidal   VITALS:  BP (!) 141/91   Pulse 91   Temp 97.8 °F (36.6 °C) (Infrared)   Resp 16   Ht 5' 6\" (1.676 m)   Wt 256 lb (116.1 kg)   SpO2 98%   BMI 41.32 kg/m²   Brief Summary Present Illness     CHIEF COMPLAINT:  Suicidality. HISTORY OF PRESENT ILLNESS:  The patient is a 47-year-old, who was  hospitalized at [facility] in 08/2021. She presented to the ED at [facility] on 61/52/9507 via police after she had taken approximately six  of her trazodone tablets in a suicide attempt.   She stated that she was  trying to kill herself and that she was upset with her mom over talk  about her sister, who is in a group home in [place]. Apparently, her  sister is 8years of age and is currently in a group home for the last  six months in [place]. She stated that she overdosed on trazodone  because that is all she had at that time. If she had more, she would  have taken more. She stated that sister apparently talks about killing  herself and others and is unable to be safe in the home. The patient  states that she is trying to get custody of her sister. She stated that  she told her mom about how she felt and she stated that her mother did  not care. She stated that if that had not worked, she was going to try  to overdose again. She states that her sleep and appetite have been  relatively intact. Hospital Course: Ms. Reji Davis was fully cooperative during treatment course. During hospital course she had two episodes of suicidal thoughts, that have resolved. She has been stable on Abilify 5mg. Attended full program in groups consistently. Has establish boundaries and goals for positive reinforcement for depression and suicidal ideation. Pt also had made plans to seek outpatient care at [facility] during hospital course. Patient stabilized on meds and milieu treatment. Patient was discharged to home to continue recovery in the community. PE: (reviewed) and labs (see medical H&PE)  Labs:    Admission on 10/09/2022   Component Date Value Ref Range Status    TSH 10/09/2022 2.35  0.27 - 4.20 uIU/mL Final    Cholesterol, Total 10/09/2022 180  0 - 199 mg/dL Final    Triglycerides 10/09/2022 113  0 - 150 mg/dL Final    HDL 10/09/2022 43  40 - 60 mg/dL Final    Comment: An HDL cholesterol less than 40 mg/dL is low and  constitutes a coronary heart disease risk factor. An HDL cholesterol greater than 60 mg/dL is a  negative risk factor for coronary heart disease.       LDL Calculated 10/09/2022 114 (A)  <100 mg/dL Final    VLDL Cholesterol Calculated 10/09/2022 23  Not Established mg/dL Final    Hemoglobin A1C 10/09/2022 5.3  See comment % Final    Comment: Comment:  Diagnosis of Diabetes: > or = 6.5%  Increased risk of diabetes (Prediabetes): 5.7-6.4%  Glycemic Control: Nonpregnant Adults: <7.0%                    Pregnant: <6.0%        eAG 10/09/2022 105.4  mg/dL Final        Mental Status Exam at Discharge:  Level of consciousness:  awake  Appearance:  well-appearing, in chair, good grooming and good hygiene well-developed, well-nourished  Behavior/Motor:  no abnormalities noted normal gait and station AIMS: 0  Attitude toward examiner:  cooperative, attentive and good eye contact  Speech:  spontaneous, normal rate, normal volume and well articulated  Mood:  dysthymic  Affect:  mood congruent Anxiety: mild  Hallucinations: Absent  Thought processes:  coherent Attention span, Concentration & Attention:  attention span and concentration were age appropriate  Thought content:   no evidence of delusions OCD: none    Insight: normal insight and judgment Cognition:  oriented to person, place, and time  Fund of Knowledge: average  IQ:average Memory: intact  Suicide:  No specific plan to harm self  Sleep: sleeps through the night  Appetite: ok   Reassess Rita Risk:  no specific plan to harm self Pt has phone numbers to contact if suicidal thoughts recur and states pt will return to the hospital if suicidal feelings return.    Hospital Routine Meds:     ARIPiprazole  5 mg Oral Daily      Hospital PRN Meds: acetaminophen, magnesium hydroxide, aluminum & magnesium hydroxide-simethicone, hydrOXYzine HCl, OLANZapine **OR** OLANZapine (ZyPREXA) in sterile water IntraMUSCular, diphenhydrAMINE, melatonin   Discharge Meds:    Current Discharge Medication List             Details   traZODone (DESYREL) 50 MG tablet Take 50 mg by mouth nightly as needed for Sleep      hydrOXYzine (VISTARIL) 25 MG capsule Take 1 capsule by mouth daily as needed for Anxiety  Qty: 14 capsule, Refills: 0      lamoTRIgine (LAMICTAL) 200 MG tablet Take 1 tablet by mouth daily  Qty: 30 tablet, Refills: 0      ARIPiprazole (ABILIFY) 10 MG tablet Take 1 tablet by mouth daily  Qty: 30 tablet, Refills: 0                    Disposition - Residence     Follow Up:  See Discharge Instructions

## 2022-10-13 NOTE — PLAN OF CARE
[address] [facility]  Discharge Note    Pt discharged with followings belongings:   Dental Appliances: None  Vision - Corrective Lenses: Eyeglasses  Hearing Aid: None  Jewelry: Bracelet  Body Piercings Removed: N/A  Clothing: Footwear, Pants, Shirt, Socks  Other Valuables: Money, Personal Toiletries ($13.21 in safe)   Valuables sent home with patient. Patient educated on aftercare instructions: yes  Information faxed to [facility] by this writer  at 3:57 PM .Patient verbalize understanding of AVS:  yes. Status EXAM upon discharge:  Mental Status and Behavioral Exam  Normal: No  Level of Assistance: Independent/Self  Facial Expression: Brightened  Affect: Appropriate  Level of Consciousness: Alert  Frequency of Checks: 4 times per hour, close  Mood:Normal: Yes  Mood: Depressed, Sad  Motor Activity:Normal: Yes  Motor Activity: Decreased  Eye Contact: Good  Observed Behavior: Cooperative  Sexual Misconduct History: Current - no  Preception: [place] to person, [place] to time, [place] to place, [place] to situation  Attention:Normal: Yes  Attention:  (WNL)  Thought Processes: Circumstantial  Thought Content:Normal: Yes  Depression Symptoms: No problems reported or observed. Anxiety Symptoms: No problems reported or observed. Snehal Symptoms: No problems reported or observed.   Hallucinations: None  Delusions: No  Memory:Normal: Yes  Insight and Judgment: No  Insight and Judgment: Poor judgment    Tobacco Screening:  Practical Counseling, on admission, zana X, if applicable and completed (first 3 are required if patient doesn't refuse):            ( ) Recognizing danger situations (included triggers and roadblocks)                    ( ) Coping skills (new ways to manage stress,relaxation techniques, changing routine, distraction)                                                           ( ) Basic information about quitting (benefits of quitting, techniques in how to quit, available resources  ( ) Referral for counseling faxed to [facility]                                                                                                                   ( ) Patient refused counseling  ( ) Patient refused referral  ( ) Patient refused prescription upon discharge  (x ) Patient has not smoked in the last 30 days    Metabolic Screening:    Lab Results   Component Value Date    LABA1C 5.3 10/09/2022       Lab Results   Component Value Date    CHOL 180 10/09/2022    CHOL 167 08/11/2021     Lab Results   Component Value Date    TRIG 113 10/09/2022    TRIG 81 08/11/2021     Lab Results   Component Value Date    HDL 43 10/09/2022    HDL 45 08/11/2021     No components found for: [facility]  Lab Results   Component Value Date    LABVLDL 23 10/09/2022    LABVLDL 16 08/11/2021       Bridge Appointment completed: Reviewed Discharge Instructions with patient. Patient verbalizes understanding and agreement with the discharge plan using the teachback method.        Vaccinations (zana X if applicable and completed):  ( ) Patient states already received influenza vaccine elsewhere  ( ) Patient received influenza vaccine during this hospitalization  ( x) Patient refused influenza vaccine at this time     Shiv Delgadillo RN

## 2022-10-13 NOTE — GROUP NOTE
Group Therapy Note    Date: 10/13/2022    Group Start Time: 1000  Group End Time: 4419  Group Topic: Psychoeducation    [address] [facility], [facility]        Group Therapy Note    Attendees: 9    Participants learned the top ten communication skills and the difference between passive, aggressive and assertive communication. Notes:  Wyatt Forrest attended group and was engaged in learning about effective communication skills. Wyatt Forrest was able to share different examples of communication styles. Wyatt Forrest was pleasant throughout group.      Status After Intervention:  Improved    Participation Level: Interactive    Participation Quality: Appropriate, Attentive, and Sharing      Speech:  normal      Thought Process/Content: Logical      Affective Functioning: Congruent      Mood: euthymic      Level of consciousness:  Alert, Oriented x4, and Attentive      Response to Learning: Able to verbalize/acknowledge new learning, Capable of insight, and Progressing to goal      Endings: None Reported    Modes of Intervention: Education and Problem-solving      Discipline Responsible: /Counselor      Signature:  SARAH Batista

## 2022-10-13 NOTE — GROUP NOTE
Group Therapy Note    Date: 10/13/2022    Group Start Time: 1100  Group End Time: 1200  Group Topic: Psychoeducation    [address] [facility]        Group Therapy Note    Topic: Challenging and Relinquishing Unrealistic Expectations    Group members explored negative self-perceptions and unrealistic expectations for self and in relationships. Group members collaborated to identify methods and techniques to challenge unrealistic expectations. Attendees: 12         Notes: This pt was present and attentive across session, collaborative with peers and facilitator in discussions of challenging unrealistic expectations. Patients collaborated in discussions of techniques and thought patterns to combat these expectations, reflecting on humor, using double-standards, and using curiosity to explore these ideas. This pt expressed understanding of the topics and had no questions at the conclusion of interventions. Status After Intervention:  Improved    Participation Level:  Active Listener and Interactive    Participation Quality: Sharing and Supportive      Speech:  normal      Thought Process/Content: Logical      Affective Functioning: Congruent      Mood: euthymic      Level of consciousness:  Alert and Oriented x4      Response to Learning: Capable of insight and Progressing to goal      Endings: None Reported    Modes of Intervention: Support, Socialization, Exploration, Clarifying, and Problem-solving      Discipline Responsible: Psychoeducational Specialist      Signature:  Franki Ingram, MM, MT-BC

## 2022-10-13 NOTE — DISCHARGE INSTRUCTIONS
Advanced Directives:  Patient does not have a surrogate decision maker appointed   Name (if yes): declined Phone Number:   Patient does not have a psychiatric and/ or medical advanced directive or power of . Patient was offered psychiatric and/ or medical advanced directive or power of  information/completion but declined to complete   Why not? declined    Discharge Planning is Complete. Discharge Date: 10/13/2022  Reason for Hospitalization: Suicidal Ideation  Discharge Diagnosis: Bipolar disorder with severe depression [facility])  Discharging to: Private Domicile    Your instructions: Your physician here was Rebel Knight MD. If you have any questions please call the unit at [phone] for the adult unit or [phone] for [facility]. Please note that we have a patient family advisory [place] that meets the second Wednesday of January and the second Wednesday of July at [address] [facility] in [place] at [facility]. Department leadership would love for you to attend to give feedback on what we are doing well and areas in which we can improve our patient care. Please come if you are able and feel free to reach out to [place] [address] at [phone] with any questions. The crisis number for [facility] is [phone]. You can use this number at any time to access emergency mental health services. Please follow up with your PCP regarding any pending labs. You report that you are not connected to a primary care physician. That referral has been made and is scheduled as follows:  Name of Provider: Dr. Bony Ahmadi MD  Provider specialty/license: primary care  Date and time of appointment:  Wednesday, October 19th, 2022, at 9:45 Am for a 10:00 AM appointment  The type/s of services requested are: establish ongoing primary care  Agency name: [facility] -- [facility]  Address: Sarabjit Roy [address] [place] [address]  Phone: [phone]  Special instructions (what to bring to appointment, etc.): Bring ID and insurance card. Arrive 15 minutes early to complete a new patient packet. You are connected to [facility] for behavioral health treatment. Your aftercare is scheduled as follows:  Name of Provider: NIKUNJ Gabriel  Provider specialty/license: counseling/medication management   Date and time of appointment: Monday, October 31st, 2022, at 11:30 AM  The type/s of services requested are: counseling/medication management  Agency name: [facility]  Address: [address] [place] [address]   Phone Number: [phone]  Special instructions (what to bring to appointment, etc.): Bring Id and insurance    Discharge Completed By: Killian Cruz LSW  Fax to: Follow up provider name and number  [facility] attn Beatrice Dunn [phone]    The following personal items were collected during your admission and were returned to you:    Belongings  Dental Appliances: None  Vision - Corrective Lenses: Eyeglasses  Hearing Aid: None  Clothing: Footwear, Pants, Shirt, Socks  Jewelry: Bracelet  Body Piercings Removed: N/A  Electronic Devices: Cell Phone  Weapons (Notify Protective Services/Security): None  Other Valuables: Money, Personal Toiletries ($13.21 in safe)  Home Medications: None  Valuables Given To: Patient, Rudolph Gomez, Other (Comment) (phone and money in safe)  Provide Name(s) of Who Valuable(s) Were Given To: Rocio Solders    By signing below, I understand and acknowledge receipt of the instructions indicated above.

## 2022-10-18 SDOH — HEALTH STABILITY: PHYSICAL HEALTH: ON AVERAGE, HOW MANY DAYS PER WEEK DO YOU ENGAGE IN MODERATE TO STRENUOUS EXERCISE (LIKE A BRISK WALK)?: 1 DAY

## 2022-10-18 SDOH — HEALTH STABILITY: PHYSICAL HEALTH: ON AVERAGE, HOW MANY MINUTES DO YOU ENGAGE IN EXERCISE AT THIS LEVEL?: 10 MIN

## 2022-10-19 ENCOUNTER — OFFICE VISIT (OUTPATIENT)
Dept: PRIMARY CARE CLINIC | Age: 22
End: 2022-10-19
Payer: COMMERCIAL

## 2022-10-19 VITALS
WEIGHT: 293 LBS | OXYGEN SATURATION: 97 % | BODY MASS INDEX: 48.82 KG/M2 | SYSTOLIC BLOOD PRESSURE: 130 MMHG | TEMPERATURE: 97.3 F | HEIGHT: 65 IN | HEART RATE: 103 BPM | DIASTOLIC BLOOD PRESSURE: 68 MMHG

## 2022-10-19 DIAGNOSIS — T50.902S INTENTIONAL DRUG OVERDOSE, SEQUELA (HCC): ICD-10-CM

## 2022-10-19 DIAGNOSIS — Z00.00 WELL ADULT EXAM: Primary | ICD-10-CM

## 2022-10-19 DIAGNOSIS — J35.1 ENLARGED TONSILS: ICD-10-CM

## 2022-10-19 DIAGNOSIS — Z11.59 SCREENING FOR VIRAL DISEASE: ICD-10-CM

## 2022-10-19 DIAGNOSIS — R06.02 SHORTNESS OF BREATH: ICD-10-CM

## 2022-10-19 DIAGNOSIS — F31.4 BIPOLAR DISORDER WITH SEVERE DEPRESSION (HCC): ICD-10-CM

## 2022-10-19 DIAGNOSIS — E78.00 PURE HYPERCHOLESTEROLEMIA: ICD-10-CM

## 2022-10-19 DIAGNOSIS — Z01.419 ENCOUNTER FOR GYNECOLOGICAL EXAMINATION WITHOUT ABNORMAL FINDING: ICD-10-CM

## 2022-10-19 DIAGNOSIS — R03.0 ELEVATED BP WITHOUT DIAGNOSIS OF HYPERTENSION: ICD-10-CM

## 2022-10-19 PROCEDURE — 99205 OFFICE O/P NEW HI 60 MIN: CPT | Performed by: INTERNAL MEDICINE

## 2022-10-19 ASSESSMENT — PATIENT HEALTH QUESTIONNAIRE - PHQ9
5. POOR APPETITE OR OVEREATING: 3
6. FEELING BAD ABOUT YOURSELF - OR THAT YOU ARE A FAILURE OR HAVE LET YOURSELF OR YOUR FAMILY DOWN: 2
3. TROUBLE FALLING OR STAYING ASLEEP: 3
SUM OF ALL RESPONSES TO PHQ QUESTIONS 1-9: 23
9. THOUGHTS THAT YOU WOULD BE BETTER OFF DEAD, OR OF HURTING YOURSELF: 3
2. FEELING DOWN, DEPRESSED OR HOPELESS: 3
SUM OF ALL RESPONSES TO PHQ QUESTIONS 1-9: 23
1. LITTLE INTEREST OR PLEASURE IN DOING THINGS: 3
4. FEELING TIRED OR HAVING LITTLE ENERGY: 3
8. MOVING OR SPEAKING SO SLOWLY THAT OTHER PEOPLE COULD HAVE NOTICED. OR THE OPPOSITE, BEING SO FIGETY OR RESTLESS THAT YOU HAVE BEEN MOVING AROUND A LOT MORE THAN USUAL: 1
SUM OF ALL RESPONSES TO PHQ QUESTIONS 1-9: 23
10. IF YOU CHECKED OFF ANY PROBLEMS, HOW DIFFICULT HAVE THESE PROBLEMS MADE IT FOR YOU TO DO YOUR WORK, TAKE CARE OF THINGS AT HOME, OR GET ALONG WITH OTHER PEOPLE: 3
SUM OF ALL RESPONSES TO PHQ QUESTIONS 1-9: 20
SUM OF ALL RESPONSES TO PHQ9 QUESTIONS 1 & 2: 6
7. TROUBLE CONCENTRATING ON THINGS, SUCH AS READING THE NEWSPAPER OR WATCHING TELEVISION: 2

## 2022-10-19 ASSESSMENT — COLUMBIA-SUICIDE SEVERITY RATING SCALE - C-SSRS
3. HAVE YOU BEEN THINKING ABOUT HOW YOU MIGHT KILL YOURSELF?: YES
4. HAVE YOU HAD THESE THOUGHTS AND HAD SOME INTENTION OF ACTING ON THEM?: YES
5. HAVE YOU STARTED TO WORK OUT OR WORKED OUT THE DETAILS OF HOW TO KILL YOURSELF? DO YOU INTEND TO CARRY OUT THIS PLAN?: NO
7. DID THIS OCCUR IN THE LAST THREE MONTHS: YES
2. HAVE YOU ACTUALLY HAD ANY THOUGHTS OF KILLING YOURSELF?: YES
1. WITHIN THE PAST MONTH, HAVE YOU WISHED YOU WERE DEAD OR WISHED YOU COULD GO TO SLEEP AND NOT WAKE UP?: YES
6. HAVE YOU EVER DONE ANYTHING, STARTED TO DO ANYTHING, OR PREPARED TO DO ANYTHING TO END YOUR LIFE?: YES

## 2022-10-19 NOTE — PATIENT INSTRUCTIONS
F/u with GYN for PAP smears as indicated    Consider tetanus TDAP booster in future    Annual flu vaccination is advised every [address] vaccination is advised at local pharmacy

## 2022-10-19 NOTE — PROGRESS NOTES
10/19/2022   [address]  2000    The patients PMH, surgical history, family history, medications, allergies were all reviewed and updated as appropriate today. Current Outpatient Medications on File Prior to Visit   Medication Sig Dispense Refill    ARIPiprazole (ABILIFY) 5 MG tablet Take 1 tablet by mouth daily 30 tablet 0     No current facility-administered medications on file prior to visit. Chief Complaint   Patient presents with    New Patient    Breathing Problem     Pt states they are mouth breathing a lot due to not being able to catch up with nose breathing     Pt states this has been going on x2 months          HPI:  on antipsychotic therapy Abilify, sees Psych  H/o cannabis use disorder  Wants CXR done due to 'breathing problem x 2 months\"  Nonsmoker but second hand smoke exposure from great grandmother    Review of Systems-depression monitoring due    OBJECTIVE:  /68   Pulse (!) 103   Temp 97.3 °F (36.3 °C) (Infrared)   Ht 5' 5\" (1.651 m)   Wt (!) 307 lb (139.3 kg)   LMP 09/22/2022   SpO2 97%   BMI 51.09 kg/m²   Wt Readings from Last 3 Encounters:   10/19/22 (!) 307 lb (139.3 kg)   10/08/22 256 lb (116.1 kg)   08/18/21 270 lb (122.5 kg)       Physical Exam  Vitals and nursing note reviewed. Constitutional:       General: She is not in acute distress. Appearance: Normal appearance. She is well-developed. HENT:      Head: Normocephalic and atraumatic. Comments: Bilateral tonsillar enlargement without erythema or exudate     Right Ear: Tympanic membrane, ear canal and external ear normal.      Left Ear: Tympanic membrane, ear canal and external ear normal.      Nose: Nose normal. No rhinorrhea. Mouth/Throat:      Pharynx: No oropharyngeal exudate or posterior oropharyngeal erythema. Eyes:      General:         Right eye: No discharge. Left eye: No discharge. Extraocular Movements: Extraocular movements intact.       Conjunctiva/sclera: Conjunctivae normal.      Pupils: Pupils are equal, round, and reactive to light. Neck:      Thyroid: No thyromegaly. Vascular: No carotid bruit or JVD. Cardiovascular:      Rate and Rhythm: Normal rate and regular rhythm. Pulses: Normal pulses. Heart sounds: Normal heart sounds. No murmur heard. Pulmonary:      Effort: Pulmonary effort is normal. No respiratory distress. Breath sounds: Normal breath sounds. No wheezing, rhonchi or rales. Abdominal:      General: Bowel sounds are normal. There is no distension. Palpations: Abdomen is soft. Tenderness: There is no abdominal tenderness. There is no rebound. Musculoskeletal:         General: No swelling. Cervical back: Normal range of motion. No muscular tenderness. Right lower leg: No edema. Left lower leg: No edema. Comments: FROM x 4   Lymphadenopathy:      Cervical: No cervical adenopathy. Skin:     General: Skin is warm and dry. Capillary Refill: Capillary refill takes 2 to 3 seconds. Coloration: Skin is not pale. Findings: No erythema or rash. Neurological:      Mental Status: She is alert and oriented to person, place, and time. Cranial Nerves: No cranial nerve deficit. Sensory: No sensory deficit. Motor: No abnormal muscle tone. Deep Tendon Reflexes: Reflexes normal.   Psychiatric:         Mood and Affect: Mood normal.         Behavior: Behavior normal.         Thought Content:  Thought content normal.         Judgment: Judgment normal.       Data Review:   CBC:   Lab Results   Component Value Date/Time    WBC 12.6 10/08/2022 11:42 PM    WBC 7.9 08/18/2021 10:57 AM    WBC 11.4 08/09/2021 01:25 AM    HGB 12.2 10/08/2022 11:42 PM    HGB 12.1 08/18/2021 10:57 AM    HGB 13.0 08/09/2021 01:25 AM    HCT 39.5 10/08/2022 11:42 PM    HCT 36.6 08/18/2021 10:57 AM    HCT 38.8 08/09/2021 01:25 AM    MCV 90.1 10/08/2022 11:42 PM    MCV 88.8 08/18/2021 10:57 AM    MCV 87.1 08/09/2021 01:25 AM     10/08/2022 11:42 PM     08/18/2021 10:57 AM     08/09/2021 01:25 AM     Chemistry:   Lab Results   Component Value Date/Time     10/08/2022 11:42 PM     08/18/2021 10:57 AM     08/09/2021 01:25 AM    K 3.9 10/08/2022 11:42 PM    K 4.8 08/18/2021 10:57 AM    K 3.7 08/09/2021 01:25 AM     10/08/2022 11:42 PM     08/18/2021 10:57 AM     08/09/2021 01:25 AM    CO2 23 10/08/2022 11:42 PM    CO2 26 08/18/2021 10:57 AM    CO2 23 08/09/2021 01:25 AM    BUN 15 10/08/2022 11:42 PM    BUN 9 08/18/2021 10:57 AM    BUN 17 08/09/2021 01:25 AM    CREATININE 0.8 10/08/2022 11:42 PM    CREATININE 0.8 08/18/2021 10:57 AM    CREATININE 0.8 08/09/2021 01:25 AM     Hepatic Function:   Lab Results   Component Value Date/Time    AST 20 08/18/2021 10:57 AM    AST 17 08/09/2021 02:58 AM    ALT 25 08/18/2021 10:57 AM    ALT 22 08/09/2021 02:58 AM    BILIDIR <0.2 08/09/2021 02:58 AM    BILITOT <0.2 08/18/2021 10:57 AM    BILITOT 0.4 08/09/2021 02:58 AM    ALKPHOS 60 08/18/2021 10:57 AM    ALKPHOS 57 08/09/2021 02:58 AM     Lab Results   Component Value Date/Time    LIPASE 28.0 08/18/2021 10:57 AM    AMYLASE 41 08/18/2021 10:57 AM     Lipids:   Lab Results   Component Value Date/Time    CHOL 180 10/09/2022 02:50 PM    HDL 43 10/09/2022 02:50 PM    TRIG 113 10/09/2022 02:50 PM       ASSESSMENT/PLAN  1.) psychosis-as per psych, on [place]    2.) Advised GYN care for routine PAP smears as indicated  Referral ordered    3.) Check CXR due to report of 'breathing problem\"  Check sleep study  ENT re: enlarged tonsils    Check baseline labs due to h/o elevated cholesterols    Consider TDAP booster  Annual flu vax advised every Fall  Bivalent COVID vas advised    Estefany Jhaveri MD        Electronically signed by Estefany Jhaveri MD on 10/19/2022 at 10:05 AM

## 2022-12-03 ENCOUNTER — E-VISIT (OUTPATIENT)
Dept: PRIMARY CARE CLINIC | Age: 22
End: 2022-12-03
Payer: COMMERCIAL

## 2022-12-03 DIAGNOSIS — J45.20 MILD INTERMITTENT ASTHMA WITHOUT COMPLICATION: Primary | ICD-10-CM

## 2022-12-03 PROCEDURE — 99422 OL DIG E/M SVC 11-20 MIN: CPT | Performed by: NURSE PRACTITIONER

## 2022-12-03 RX ORDER — ALBUTEROL SULFATE 90 UG/1
2 AEROSOL, METERED RESPIRATORY (INHALATION) EVERY 6 HOURS PRN
Qty: 18 G | Refills: 3 | Status: SHIPPED | OUTPATIENT
Start: 2022-12-03

## 2022-12-03 ASSESSMENT — LIFESTYLE VARIABLES: SMOKING_STATUS: NO, I'VE NEVER SMOKED

## 2022-12-03 NOTE — PROGRESS NOTES
Reviewed questionnaire     Reviewed meds/allergies    Dx Asthma    Plan Rx given for albuterol inhaler, follow up with PCP if no improvement    Time spent on visit 11 min

## 2023-02-26 ENCOUNTER — HOSPITAL ENCOUNTER (EMERGENCY)
Age: 23
Discharge: PSYCHIATRIC OR SUBSTANCE USE FACILITY | End: 2023-02-27
Attending: EMERGENCY MEDICINE
Payer: COMMERCIAL

## 2023-02-26 DIAGNOSIS — T39.1X2A INTENTIONAL ACETAMINOPHEN OVERDOSE, INITIAL ENCOUNTER (HCC): ICD-10-CM

## 2023-02-26 DIAGNOSIS — R45.851 SUICIDAL IDEATION: Primary | ICD-10-CM

## 2023-02-26 LAB
A/G RATIO: 1.1 (ref 1.1–2.2)
ACETAMINOPHEN LEVEL: 37 UG/ML (ref 10–30)
ALBUMIN SERPL-MCNC: 3.7 G/DL (ref 3.4–5)
ALP BLD-CCNC: 53 U/L (ref 40–129)
ALT SERPL-CCNC: 15 U/L (ref 10–40)
AMPHETAMINE SCREEN, URINE: NORMAL
ANION GAP SERPL CALCULATED.3IONS-SCNC: 8 MMOL/L (ref 3–16)
APTT: 26.4 SEC (ref 23–34.3)
AST SERPL-CCNC: 20 U/L (ref 15–37)
BACTERIA: ABNORMAL /HPF
BARBITURATE SCREEN URINE: NORMAL
BASOPHILS ABSOLUTE: 0.1 K/UL (ref 0–0.2)
BASOPHILS RELATIVE PERCENT: 0.8 %
BENZODIAZEPINE SCREEN, URINE: NORMAL
BILIRUB SERPL-MCNC: <0.2 MG/DL (ref 0–1)
BILIRUBIN URINE: NEGATIVE
BLOOD, URINE: NEGATIVE
BUN BLDV-MCNC: 13 MG/DL (ref 7–20)
CALCIUM SERPL-MCNC: 9.4 MG/DL (ref 8.3–10.6)
CANNABINOID SCREEN URINE: NORMAL
CHLORIDE BLD-SCNC: 105 MMOL/L (ref 99–110)
CLARITY: CLEAR
CO2: 25 MMOL/L (ref 21–32)
COCAINE METABOLITE SCREEN URINE: NORMAL
COLOR: YELLOW
CREAT SERPL-MCNC: 0.7 MG/DL (ref 0.6–1.1)
EOSINOPHILS ABSOLUTE: 0.1 K/UL (ref 0–0.6)
EOSINOPHILS RELATIVE PERCENT: 1.1 %
EPITHELIAL CELLS, UA: 4 /HPF (ref 0–5)
ETHANOL: NORMAL MG/DL (ref 0–0.08)
FENTANYL SCREEN, URINE: NORMAL
GFR SERPL CREATININE-BSD FRML MDRD: >60 ML/MIN/{1.73_M2}
GLUCOSE BLD-MCNC: 92 MG/DL (ref 70–99)
GLUCOSE URINE: NEGATIVE MG/DL
HCG QUALITATIVE: NEGATIVE
HCT VFR BLD CALC: 34.4 % (ref 36–48)
HEMOGLOBIN: 11 G/DL (ref 12–16)
HYALINE CASTS: 0 /LPF (ref 0–8)
INR BLD: 0.99 (ref 0.87–1.14)
KETONES, URINE: ABNORMAL MG/DL
LEUKOCYTE ESTERASE, URINE: NEGATIVE
LIPASE: 25 U/L (ref 13–60)
LYMPHOCYTES ABSOLUTE: 2.4 K/UL (ref 1–5.1)
LYMPHOCYTES RELATIVE PERCENT: 19.9 %
Lab: NORMAL
MCH RBC QN AUTO: 27.6 PG (ref 26–34)
MCHC RBC AUTO-ENTMCNC: 32 G/DL (ref 31–36)
MCV RBC AUTO: 86 FL (ref 80–100)
METHADONE SCREEN, URINE: NORMAL
MICROSCOPIC EXAMINATION: YES
MONOCYTES ABSOLUTE: 0.7 K/UL (ref 0–1.3)
MONOCYTES RELATIVE PERCENT: 5.5 %
NEUTROPHILS ABSOLUTE: 8.9 K/UL (ref 1.7–7.7)
NEUTROPHILS RELATIVE PERCENT: 72.7 %
NITRITE, URINE: POSITIVE
OPIATE SCREEN URINE: NORMAL
OXYCODONE URINE: NORMAL
PDW BLD-RTO: 15.3 % (ref 12.4–15.4)
PH UA: 6
PH UA: 6 (ref 5–8)
PHENCYCLIDINE SCREEN URINE: NORMAL
PLATELET # BLD: 412 K/UL (ref 135–450)
PMV BLD AUTO: 7.8 FL (ref 5–10.5)
POTASSIUM SERPL-SCNC: 5.1 MMOL/L (ref 3.5–5.1)
PROTEIN UA: NEGATIVE MG/DL
PROTHROMBIN TIME: 13 SEC (ref 11.7–14.5)
RBC # BLD: 4 M/UL (ref 4–5.2)
RBC UA: 0 /HPF (ref 0–4)
SALICYLATE, SERUM: <0.3 MG/DL (ref 15–30)
SODIUM BLD-SCNC: 138 MMOL/L (ref 136–145)
SPECIFIC GRAVITY UA: >=1.03 (ref 1–1.03)
TOTAL PROTEIN: 7.2 G/DL (ref 6.4–8.2)
URINE REFLEX TO CULTURE: ABNORMAL
URINE TYPE: ABNORMAL
UROBILINOGEN, URINE: 0.2 E.U./DL
WBC # BLD: 12.3 K/UL (ref 4–11)
WBC UA: 3 /HPF (ref 0–5)

## 2023-02-26 PROCEDURE — 85025 COMPLETE CBC W/AUTO DIFF WBC: CPT

## 2023-02-26 PROCEDURE — 82077 ASSAY SPEC XCP UR&BREATH IA: CPT

## 2023-02-26 PROCEDURE — 84703 CHORIONIC GONADOTROPIN ASSAY: CPT

## 2023-02-26 PROCEDURE — 83690 ASSAY OF LIPASE: CPT

## 2023-02-26 PROCEDURE — 99285 EMERGENCY DEPT VISIT HI MDM: CPT

## 2023-02-26 PROCEDURE — 85610 PROTHROMBIN TIME: CPT

## 2023-02-26 PROCEDURE — 81001 URINALYSIS AUTO W/SCOPE: CPT

## 2023-02-26 PROCEDURE — 36415 COLL VENOUS BLD VENIPUNCTURE: CPT

## 2023-02-26 PROCEDURE — 80143 DRUG ASSAY ACETAMINOPHEN: CPT

## 2023-02-26 PROCEDURE — 80053 COMPREHEN METABOLIC PANEL: CPT

## 2023-02-26 PROCEDURE — 80307 DRUG TEST PRSMV CHEM ANLYZR: CPT

## 2023-02-26 PROCEDURE — 93005 ELECTROCARDIOGRAM TRACING: CPT | Performed by: PHYSICIAN ASSISTANT

## 2023-02-26 PROCEDURE — 80179 DRUG ASSAY SALICYLATE: CPT

## 2023-02-26 PROCEDURE — 85730 THROMBOPLASTIN TIME PARTIAL: CPT

## 2023-02-26 RX ORDER — ARIPIPRAZOLE LAUROXIL 882 MG/3.2ML
INJECTION, SUSPENSION, EXTENDED RELEASE INTRAMUSCULAR
COMMUNITY
Start: 2023-02-17

## 2023-02-26 RX ORDER — NORGESTIMATE AND ETHINYL ESTRADIOL 0.25-0.035
1 KIT ORAL DAILY
COMMUNITY
Start: 2023-01-20

## 2023-02-26 RX ORDER — BUSPIRONE HYDROCHLORIDE 7.5 MG/1
TABLET ORAL
COMMUNITY
Start: 2023-02-07

## 2023-02-26 ASSESSMENT — PAIN - FUNCTIONAL ASSESSMENT: PAIN_FUNCTIONAL_ASSESSMENT: 0-10

## 2023-02-26 ASSESSMENT — PAIN DESCRIPTION - LOCATION: LOCATION: CHEST

## 2023-02-26 ASSESSMENT — PAIN SCALES - GENERAL: PAINLEVEL_OUTOF10: 4

## 2023-02-27 VITALS
DIASTOLIC BLOOD PRESSURE: 85 MMHG | HEIGHT: 66 IN | RESPIRATION RATE: 18 BRPM | SYSTOLIC BLOOD PRESSURE: 139 MMHG | OXYGEN SATURATION: 95 % | WEIGHT: 293 LBS | BODY MASS INDEX: 47.09 KG/M2 | HEART RATE: 107 BPM | TEMPERATURE: 97 F

## 2023-02-27 LAB
ACETAMINOPHEN LEVEL: 18 UG/ML (ref 10–30)
EKG ATRIAL RATE: 92 BPM
EKG DIAGNOSIS: NORMAL
EKG P AXIS: 46 DEGREES
EKG P-R INTERVAL: 158 MS
EKG Q-T INTERVAL: 388 MS
EKG QRS DURATION: 100 MS
EKG QTC CALCULATION (BAZETT): 479 MS
EKG R AXIS: 4 DEGREES
EKG T AXIS: 9 DEGREES
EKG VENTRICULAR RATE: 92 BPM
RAPID INFLUENZA  B AGN: NEGATIVE
RAPID INFLUENZA A AGN: NEGATIVE
SARS-COV-2, NAAT: NOT DETECTED

## 2023-02-27 PROCEDURE — 87635 SARS-COV-2 COVID-19 AMP PRB: CPT

## 2023-02-27 PROCEDURE — 93010 ELECTROCARDIOGRAM REPORT: CPT | Performed by: INTERNAL MEDICINE

## 2023-02-27 PROCEDURE — 87804 INFLUENZA ASSAY W/OPTIC: CPT

## 2023-02-27 ASSESSMENT — ENCOUNTER SYMPTOMS
DIARRHEA: 0
VOMITING: 0
SHORTNESS OF BREATH: 0
NAUSEA: 0
RHINORRHEA: 0
ABDOMINAL PAIN: 0
COUGH: 0

## 2023-02-27 ASSESSMENT — LIFESTYLE VARIABLES
HOW OFTEN DO YOU HAVE A DRINK CONTAINING ALCOHOL: MONTHLY OR LESS
HOW MANY STANDARD DRINKS CONTAINING ALCOHOL DO YOU HAVE ON A TYPICAL DAY: 1 OR 2

## 2023-02-27 NOTE — ED NOTES
D pt placed in suicidal precations per protocol in ed room  A belongings include 3 dollars 11 cents. Cell phone. Ear buds. Pants. Shirt. Bra.       Michelle Bellamy RN  02/26/23 6617

## 2023-02-27 NOTE — ED NOTES
Pt resting quietly in bed, no s/s distress noted.  Suicide precaution in place.   Donato Mccollum bedside, due to, suicidal attempt.  Patient in gown with ties cut off.  All belongings at [address] above [facility] removed from room for safety.  Patients Room is free of all removable equipment and medical supplies and all medical cords/cables removed.  Bedside cart removed.  Will continue to monitor.      A safety risk assessment of the patient environment was conducted and the room was modified for safety. The room is free of all removed equipment, medical supplies, and articles that may pose a threat to the patient or others such as sharp items.  The patient call light was left in place due to the medical nature of the unit and the needs of the patient.  The patient was place in a room close to the nursing desk.  Fall risk precautions in place.  Bed locked and in lowest position with 2/2 bed rails up.  Call light within reach. Will continue to monitor patient hourly.      Daryle Bulla, RN  02/27/23 9409

## 2023-02-27 NOTE — ED NOTES
Pt resting quietly in bed, no s/s distress noted.  Suicide precaution in place.   Wayne Martin bedside, due to, suicidal attempt.  Patient in gown with ties cut off.  All belongings at [address] above [facility] removed from room for safety.  Patients Room is free of all removable equipment and medical supplies and all medical cords/cables removed.  Bedside cart removed.  Will continue to monitor.      A safety risk assessment of the patient environment was conducted and the room was modified for safety. The room is free of all removed equipment, medical supplies, and articles that may pose a threat to the patient or others such as sharp items.  The patient call light was left in place due to the medical nature of the unit and the needs of the patient.  The patient was place in a room close to the nursing desk.  Fall risk precautions in place.  Bed locked and in lowest position with 2/2 bed rails up.  Call light within reach. Will continue to monitor patient hourly.      Stanley Howard RN  02/27/23 6085

## 2023-02-27 NOTE — ED NOTES
Pt resting quietly in bed, no s/s distress noted.  Suicide precaution in place.   [place] at bedside, due to, suicidal attempt.  Patient in gown with ties cut off.  All belongings at security.   Board above [facility] removed from room for safety.  Patients Room is free of all removable equipment and medical supplies and all medical cords/cables removed.  Bedside cart removed.  Will continue to monitor.      A safety risk assessment of the patient environment was conducted and the room was modified for safety. The room is free of all removed equipment, medical supplies, and articles that may pose a threat to the patient or others such as sharp items.  The patient call light was left in place due to the medical nature of the unit and the needs of the patient.  The patient was place in a room close to the nursing desk.  Fall risk precautions in place.  Bed locked and in lowest position with 2/2 bed rails up.  Call light within reach. Will continue to monitor patient hourly.      Ann Marie Troy RN  02/27/23 5667

## 2023-02-27 NOTE — ED NOTES
Pt resting quietly in bed, no s/s distress noted. Suicide precaution in place.  Yfn Viramontes at bedside, due to, suicidal attempt. Patient in gown with ties cut off. All belongings at security. Board above [facility] removed from room for safety. Patients Room is free of all removable equipment and medical supplies and all medical cords/cables removed. Bedside cart removed. Will continue to monitor.      A safety risk assessment of the patient environment was conducted and the room was modified for safety. The room is free of all removed equipment, medical supplies, and articles that may pose a threat to the patient or others such as sharp items. The patient call light was left in place due to the medical nature of the unit and the needs of the patient. The patient was place in a room close to the nursing desk. Fall risk precautions in place. Bed locked and in lowest position with 2/2 bed rails up. Call light within reach.  Will continue to monitor patient hourly.        Adelso Martinez RN  02/27/23 0671

## 2023-02-27 NOTE — ED NOTES
Pt transported to [place] via [facility] ([facility]) transport. Patient alert and oriented. Skin appropriate for ethnicity, dry and intact. No signs of acute distress noted at this time. Regular respiratory pattern, normal respiratory depth, unlabored respirations.          [place] Jose L, RN  02/27/23 8110

## 2023-02-27 NOTE — ED NOTES
Pt resting quietly in bed, no s/s distress noted.  Suicide precaution in place.   Ana Maria Morales bedside, due to, suicidal attempt.  Patient in gown with ties cut off.  All belongings at [address] above [facility] removed from room for safety.  Patients Room is free of all removable equipment and medical supplies and all medical cords/cables removed.  Bedside cart removed.  Will continue to monitor.      A safety risk assessment of the patient environment was conducted and the room was modified for safety. The room is free of all removed equipment, medical supplies, and articles that may pose a threat to the patient or others such as sharp items.  The patient call light was left in place due to the medical nature of the unit and the needs of the patient.  The patient was place in a room close to the nursing desk.  Fall risk precautions in place.  Bed locked and in lowest position with 2/2 bed rails up.  Call light within reach. Will continue to monitor patient hourly.      Carley Davis RN  02/27/23 0208

## 2023-02-27 NOTE — ED TRIAGE NOTES
D all belongings removed and placed in belongings bag. Given to security. Pt changed into hospital gown.

## 2023-02-27 NOTE — ED PROVIDER NOTES
[facility]        Pt Name: Daisy Owen  MRN: [id number]  [place] 2000  Date of evaluation: 2/26/2023  Provider: Semaj Hodge PA-C  PCP: Mary Beth Carvalho MD  Note Started: 12:40 AM EST 2/27/23       I have seen and evaluated this patient with my supervising physician Chuck Crow MD.      [address] [place] [address]       Chief Complaint   Patient presents with    Facial Burn    Drug Overdose     C/o overdose on acetaminophen approximate a handful of 500mg tablets about an hour ago. Pt was upset with mother. C/o chest pain and stomach pain. Denies nausea. HISTORY OF PRESENT ILLNESS: 1 or more Elements     History From: Patient  Limitations to history : None    Daisy Owen is a 23 y.o. female who presents to the emergency department today for evaluation for suicidal attempt, and a medication overdose. The patient states that she does have a history of anxiety, and depression, and she states that she otherwise takes her medication but \"not as often as I should\". Patient states that over the past week she has had increasing depression, and suicidal thoughts. The patient states that today she was in an argument with her mother, and she states that she took 20 pills approximately of 500 mg Tylenol tablets in an attempt to hurt herself. The patient states that she did this around 8 PM.  The patient states that after taking this, she states that she did have an episode of emesis and is unsure if she noticed any pills within the emesis or not. The patient arrives to the ED she states that she otherwise has no complaints. She is no abdominal pain. She is no chest pain or shortness of breath. She has no nausea, vomiting or diarrhea. She has no urinary symptoms. She denies any alcohol use, drug use. She did not take any other pills. Patient otherwise has no other complaints.     Nursing Notes were all reviewed and agreed with or any disagreements were addressed in the HPI. REVIEW OF SYSTEMS :      Review of Systems   Constitutional:  Negative for activity change, appetite change, chills and fever. HENT:  Negative for congestion and rhinorrhea. Respiratory:  Negative for cough and shortness of breath. Cardiovascular:  Negative for chest pain. Gastrointestinal:  Negative for abdominal pain, diarrhea, nausea and vomiting. Genitourinary:  Negative for difficulty urinating, dysuria and hematuria. Psychiatric/Behavioral:  Positive for self-injury and suicidal ideas. Positives and Pertinent negatives as per HPI. SURGICAL HISTORY   History reviewed. No pertinent surgical history. CURRENTMEDICATIONS       Previous Medications    ALBUTEROL SULFATE HFA (PROAIR HFA) 108 (90 BASE) MCG/ACT INHALER    Inhale 2 puffs into the lungs every 6 hours as needed for Wheezing    ARISTADA 882 MG/3.2ML PRSY INJECTION        BUSPIRONE (BUSPAR) 7.5 MG TABLET    TAKE 1 TABLET BY MOUTH TWICE A DAY TAKE AT LEAST 6 HOURS APART ( IE. 9 AM AND 3PM )    NORGESTIMATE-ETHINYL ESTRADIOL (ORTHO-CYCLEN) 0.25-35 MG-MCG PER TABLET    Take 1 tablet by mouth daily       ALLERGIES     Banana, Coconut fatty acids, Macadamia nut oil, Nutritional supplements, Pineapple extract, Bee venom, and Other    FAMILYHISTORY       Family History   Problem Relation Age of Onset    Mental Illness Mother     Depression Mother     High Blood Pressure Mother     Miscarriages / [place] Mother     Substance Abuse Mother     No Known Problems Sister     Alcohol Abuse Maternal Grandfather     Cancer Maternal Grandfather     Depression Maternal Grandmother     Alcohol Abuse Paternal Grandmother     Breast Cancer Paternal Aunt         SOCIAL HISTORY       Social History     Tobacco Use    Smoking status: Never    Smokeless tobacco: Never   Vaping Use    Vaping Use: Never used   Substance Use Topics    Alcohol use:  Yes     Alcohol/week: 1.0 standard drink     Types: 1 Glasses of wine per week     Comment: once every two weeks    Drug use: No       SCREENINGS        [place] Coma Scale  Eye Opening: Spontaneous  Best Verbal Response: Oriented  Best Motor Response: Obeys commands  [place] Coma Scale Score: 15                CIWA Assessment  BP: 109/79  Heart Rate: 100           PHYSICAL EXAM  1 or more Elements     ED Triage Vitals [02/26/23 2141]   BP Temp Temp Source Heart Rate Resp SpO2 Height Weight   109/79 97.7 °F (36.5 °C) Tympanic 100 18 96 % 5' 6\" (1.676 m) (!) 315 lb (142.9 kg)       Physical Exam  Vitals and nursing note reviewed. Constitutional:       Appearance: She is well-developed. She is not diaphoretic. HENT:      Head: Normocephalic and atraumatic. Right Ear: External ear normal.      Left Ear: External ear normal.      Nose: Nose normal.   Eyes:      General:         Right eye: No discharge. Left eye: No discharge. Neck:      Trachea: No tracheal deviation. Cardiovascular:      Rate and Rhythm: Normal rate and regular rhythm. Heart sounds: No murmur heard. Pulmonary:      Effort: Pulmonary effort is normal. No respiratory distress. Breath sounds: Normal breath sounds. No wheezing or rales. Abdominal:      General: Bowel sounds are normal. There is no distension. Palpations: Abdomen is soft. Tenderness: There is no abdominal tenderness. There is no guarding. Musculoskeletal:         General: Normal range of motion. Cervical back: Normal range of motion and neck supple. Skin:     General: Skin is warm and dry. Neurological:      Mental Status: She is alert and oriented to person, place, and time. Psychiatric:         Behavior: Behavior normal.         Thought Content: Thought content includes suicidal ideation. Thought content includes suicidal plan.            DIAGNOSTIC RESULTS   LABS:    Labs Reviewed   CBC WITH AUTO DIFFERENTIAL - Abnormal; Notable for the following components:       Result Value    WBC 12.3 (*)     Hemoglobin 11.0 (*)     Hematocrit 34.4 (*)     Neutrophils Absolute 8.9 (*)     All other components within normal limits   URINALYSIS WITH REFLEX TO CULTURE - Abnormal; Notable for the following components:    Ketones, Urine TRACE (*)     Nitrite, Urine POSITIVE (*)     All other components within normal limits   ACETAMINOPHEN LEVEL - Abnormal; Notable for the following components:    Acetaminophen Level 37 (*)     All other components within normal limits   SALICYLATE LEVEL - Abnormal; Notable for the following components:    Salicylate, Serum <6.3 (*)     All other components within normal limits   MICROSCOPIC URINALYSIS - Abnormal; Notable for the following components:    Bacteria, UA 1+ (*)     All other components within normal limits   COMPREHENSIVE METABOLIC PANEL   LIPASE   URINE DRUG SCREEN   HCG, SERUM, QUALITATIVE   APTT   PROTIME-INR   ETHANOL   ACETAMINOPHEN LEVEL       When ordered only abnormal lab results are displayed. All other labs were within normal range or not returned as of this dictation. EKG: When ordered, EKG's are interpreted by the Emergency Department Physician in the absence of a cardiologist.  Please see their note for interpretation of EKG. RADIOLOGY:   Non-plain film images such as CT, Ultrasound and MRI are read by the radiologist. Plain radiographic images are visualized and preliminarily interpreted by the ED Provider with the below findings:        Interpretation per the Radiologist below, if available at the time of this note:    No orders to display     No results found. No results found. PROCEDURES   Unless otherwise noted below, none     Procedures    CRITICAL CARE TIME (.cctime)   The total critical care time I independently spent while evaluating and treating this patient was 40 minutes. This excludes time spent doing separately billable procedures.   This includes time at the bedside, data interpretation, medication management, obtaining critical history from collateral sources if the patient is unable to provide it directly, and physician consultation. Specifics of interventions taken and potentially life-threatening diagnostic considerations are listed above in the medical decision making. If this was a shared visit with an physician, the time in this attestation is non-concurrent critical care time out of the total shared critical care time provided by the physician and myself. PAST MEDICAL HISTORY      has a past medical history of Depression and Obesity. EMERGENCY DEPARTMENT COURSE and DIFFERENTIAL DIAGNOSIS/MDM:   Vitals:    Vitals:    02/26/23 2141   BP: 109/79   Pulse: 100   Resp: 18   Temp: 97.7 °F (36.5 °C)   TempSrc: Tympanic   SpO2: 96%   Weight: (!) 315 lb (142.9 kg)   Height: 5' 6\" (1.676 m)       Patient was given the following medications:  Medications - No data to display          Is this patient to be included in the SEP-1 Core Measure due to severe sepsis or septic shock? No   Exclusion criteria - the patient is NOT to be included for SEP-1 Core Measure due to: Infection is not suspected    Chronic Conditions affecting care:  has a past medical history of Depression and Obesity. CONSULTS: (Who and What was discussed)  Poison control, Tylenol level now, and at 4 hours after ingestion around 12 AM, and if under 150 she is medically cleared and otherwise does not require treatment. Social Determinants Significantly Affecting Health : None    Records Reviewed (External and Source) Previous emergency room records for suicidal ideation, and intentional drug overdose     CC/HPI Summary, DDx, ED Course, and Reassessment: Briefly, this is a 25-year-old female who presents to the emergency department today for evaluation for an intentional overdose. The patient states that she has a history of anxiety and depression, and she states that she has had increasing suicidal thoughts for the past week.   Tonight after a verbal altercation with her mother she states that she took 20 a pills of 500 mg Tylenol. Patient states a that she did do this in an attempt to hurt herself. After taking this, she states that she did vomit. Patient arrives to the ED she is otherwise asymptomatic. Physical examination, vital signs are stable. Her abdomen is benign    Disposition Considerations (tests considered but not done, Admit vs D/C, Shared Decision Making, Pt Expectation of Test or Tx.):    Patient otherwise is well-appearing, vital stable. I did discuss the case with poison control, recommended Tylenol level now, and repeat at 4 hours after time of ingestion, around 12 AM, and if under 150 otherwise does not require treatment and is medically cleared. EKG is obtained and is documented by my attending, please see his note for further details. CBC shows no a nonspecific leukocytosis of 12.3, there is a mild anemia. CMP is unremarkable. Lipase is normal.  Acetaminophen level initially is at 37 however this was repeated at 12 AM at the 4-hour zana and is noted to be 18. Ethanol level negative    Patient's vital signs have remained stable, patient has never had a Tylenol level above 150 and otherwise is medically cleared at this time. 72-hour hold is signed, suicide precautions in place awaiting acceptance from psychiatric facility at this time she is otherwise stable for transfer. I am the Primary Clinician of Record. FINAL IMPRESSION      1. Suicidal ideation    2. Intentional acetaminophen overdose, initial encounter [facility])          DISPOSITION/PLAN     DISPOSITION Decision To Transfer 02/27/2023 12:34:35 AM      PATIENT REFERRED TO:  No follow-up provider specified.     DISCHARGE MEDICATIONS:  New Prescriptions    No medications on file       DISCONTINUED MEDICATIONS:  Discontinued Medications    ARIPIPRAZOLE (ABILIFY) 5 MG TABLET    Take 1 tablet by mouth daily              (Please note that portions of this note were completed with a voice recognition program.  Efforts were made to edit the dictations but occasionally words are mis-transcribed.)    Satnam Flynn PA-C (electronically signed)        Satnam Flynn PA-C  02/27/23 3025

## 2023-02-27 NOTE — ED NOTES
Pt resting quietly in bed, no s/s distress noted. Suicide precaution in place.  Dori Elders at bedside, due to, suicidal attempt. Patient in gown with ties cut off. All belongings at security. Board above [facility] removed from room for safety. Patients Room is free of all removable equipment and medical supplies and all medical cords/cables removed. Bedside cart removed. Will continue to monitor.      A safety risk assessment of the patient environment was conducted and the room was modified for safety. The room is free of all removed equipment, medical supplies, and articles that may pose a threat to the patient or others such as sharp items. The patient call light was left in place due to the medical nature of the unit and the needs of the patient. The patient was place in a room close to the nursing desk. Fall risk precautions in place. Bed locked and in lowest position with 2/2 bed rails up. Call light within reach.  Will continue to monitor patient hourly.        Verenice Beltran RN  02/27/23 5005

## 2023-02-27 NOTE — ED PROVIDER NOTES
[facility]    Physician Attestation    Pt Name: Allie Thomas  MRN: [id number]  [place] 2000  Date of evaluation: 2/26/23        Physician Note:    I havepersonally performed and/or participated in the history, exam and medical decision making and agree with all pertinent clinical information. I have also reviewed and agree with the past medical, family and social historyunless otherwise noted. I have personally performed a face to face diagnostic evaluation onthis patient. I have reviewed the mid-levels findings and agree. History: Patient took 20 Tylenol 500 mg about an hour prior to arrival she had some vomiting denies taking any other medicine was upset states her mother upset her and she wanted to kill herself she does not feel that way now      REVIEW OF SYSTEMS    Constitutional:  Denies fever, chills, or weakness   Eyes:  Denies vision changes  HENT:  Denies sore throat or neck pain   Respiratory:  Denies cough or shortness of breath   Cardiovascular:  Denies chest pain  GI:  Denies abdominal pain, nausea, vomiting, or diarrhea   Musculoskeletal:  Denies back pain   Skin: no rash or vesicles   Neurologic:  no headache weakness focal    Lymphatic:  no swollen  nodes   Psychiatric: no hs thoughts     All systems negative except as marked. General Appearance:  Alert, cooperative, no distress, appears stated age. Head:  Normocephalic, without obvious abnormality, atraumatic. Eyes:  conjunctiva/corneas clear, EOM's intact. Sclera anicteric. ENT: Mucous membranes moist.   Neck: Supple, symmetrical, trachea midline, no adenopathy. No jugular venous distention. Lungs:   No Respiratory Distress. no rales  rhonchi rub   Chest Wall:  Nontender  no deformity   Heart:  Rsr no murmer gallop    Abdomen:   Soft nontender no organomegally    Extremities:  Full range of motion. no deformity   Pulses: Equal  upper and lower    Skin:  No rashes or lesions to exposed skin. Neurologic: Alert and oriented X 3. Motor grossly normal.  Speech clear. Cr n 2-12 intact   EKG Interpretation    Interpreted by emergency department physician    Rhythm: normal sinus   Rate: normal  Axis: normal  Ectopy: none  Conduction: normal  ST Segments: no acute change  T Waves: no acute change  Q Waves: none    Clinical Impression: Normal sinus rhythm    Santos Jacques MD  Did discuss the case with poison control she will need a repeat Tylenol level 4 hours after the initial ingestion which would be around midnight  Initial level was 37 for the acetaminophen and now it is 19 at the 4-hour zana way below any concern for toxicity patient is medically cleared transfer to psychiatric unit    1. Suicidal ideation    2. Intentional acetaminophen overdose, initial encounter ([facility] [address]          DISPOSITION/PLAN  PATIENT REFERRED TO:  No follow-up provider specified. DISCHARGE MEDICATIONS:  New Prescriptions    No medications on file         Is this patient to be included in the SEP-1 Core Measure due to severe sepsis or septic shock? No   Exclusion criteria - the patient is NOT to be included for SEP-1 Core Measure due to:   Infection is not suspected      MD Santos Moore MD  02/27/23 MD Kendra  02/27/23 [id number]

## 2023-02-27 NOTE — ED NOTES
Pt resting quietly in bed, no s/s distress noted. Suicide precaution in place.  Melania Diggs at bedside, due to, suicidal attempt. Patient in gown with ties cut off. All belongings at security. Board above [facility] removed from room for safety. Patients Room is free of all removable equipment and medical supplies and all medical cords/cables removed. Bedside cart removed. Will continue to monitor. A safety risk assessment of the patient environment was conducted and the room was modified for safety. The room is free of all removed equipment, medical supplies, and articles that may pose a threat to the patient or others such as sharp items. The patient call light was left in place due to the medical nature of the unit and the needs of the patient. The patient was place in a room close to the nursing desk. Fall risk precautions in place. Bed locked and in lowest position with 2/2 bed rails up. Call light within reach. Will continue to monitor patient hourly.                   Ann Marie Troy RN  02/27/23 4885

## 2023-02-27 NOTE — ED NOTES
Pt in bed no s/s pain. Sitter at bedside. Room to door open. Given apple juice per md. Repeat labs sent.       Miguel Ramos RN  02/27/23 0000

## 2023-02-27 NOTE — ED NOTES
Pt resting quietly in bed, no s/s distress noted.  Suicide precaution in place.   Bulmaro Burt bedside, due to, suicidal attempt.  Patient in gown with ties cut off.  All belongings at [address] above [facility] removed from room for safety.  Patients Room is free of all removable equipment and medical supplies and all medical cords/cables removed.  Bedside cart removed.  Will continue to monitor.      A safety risk assessment of the patient environment was conducted and the room was modified for safety. The room is free of all removed equipment, medical supplies, and articles that may pose a threat to the patient or others such as sharp items.  The patient call light was left in place due to the medical nature of the unit and the needs of the patient.  The patient was place in a room close to the nursing desk.  Fall risk precautions in place.  Bed locked and in lowest position with 2/2 bed rails up.  Call light within reach. Will continue to monitor patient hourly.      Adelso Martinez RN  02/27/23 9300

## 2023-04-27 ENCOUNTER — E-VISIT (OUTPATIENT)
Dept: PRIMARY CARE CLINIC | Age: 23
End: 2023-04-27
Payer: COMMERCIAL

## 2023-04-27 DIAGNOSIS — J06.9 UPPER RESPIRATORY TRACT INFECTION, UNSPECIFIED TYPE: Primary | ICD-10-CM

## 2023-04-27 PROCEDURE — 99422 OL DIG E/M SVC 11-20 MIN: CPT | Performed by: NURSE PRACTITIONER

## 2023-04-27 RX ORDER — AMOXICILLIN AND CLAVULANATE POTASSIUM 875; 125 MG/1; MG/1
1 TABLET, FILM COATED ORAL 2 TIMES DAILY
Qty: 20 TABLET | Refills: 0 | Status: SHIPPED | OUTPATIENT
Start: 2023-04-27 | End: 2023-05-07

## 2023-04-27 ASSESSMENT — LIFESTYLE VARIABLES: SMOKING_STATUS: NO, I'VE NEVER SMOKED

## 2023-06-20 ENCOUNTER — APPOINTMENT (OUTPATIENT)
Dept: CT IMAGING | Age: 23
End: 2023-06-20
Payer: COMMERCIAL

## 2023-06-20 ENCOUNTER — APPOINTMENT (OUTPATIENT)
Dept: GENERAL RADIOLOGY | Age: 23
End: 2023-06-20
Payer: COMMERCIAL

## 2023-06-20 ENCOUNTER — HOSPITAL ENCOUNTER (EMERGENCY)
Age: 23
Discharge: HOME | End: 2023-06-21
Attending: EMERGENCY MEDICINE
Payer: COMMERCIAL

## 2023-06-20 DIAGNOSIS — R11.2 NAUSEA AND VOMITING, UNSPECIFIED VOMITING TYPE: Primary | ICD-10-CM

## 2023-06-20 LAB
ALBUMIN SERPL-MCNC: 3.8 G/DL (ref 3.4–5)
ALBUMIN/GLOB SERPL: 1 {RATIO} (ref 1.1–2.2)
ALP SERPL-CCNC: 62 U/L (ref 40–129)
ALT SERPL-CCNC: 16 U/L (ref 10–40)
ANION GAP SERPL CALCULATED.3IONS-SCNC: 11 MMOL/L (ref 3–16)
AST SERPL-CCNC: 14 U/L (ref 15–37)
BACTERIA URNS QL MICRO: ABNORMAL /HPF
BASOPHILS # BLD: 0 K/UL (ref 0–0.2)
BASOPHILS NFR BLD: 0.4 %
BILIRUB SERPL-MCNC: <0.2 MG/DL (ref 0–1)
BILIRUB UR QL STRIP.AUTO: NEGATIVE
BUN SERPL-MCNC: 13 MG/DL (ref 7–20)
CALCIUM SERPL-MCNC: 9 MG/DL (ref 8.3–10.6)
CHLORIDE SERPL-SCNC: 104 MMOL/L (ref 99–110)
CLARITY UR: CLEAR
CO2 SERPL-SCNC: 24 MMOL/L (ref 21–32)
COLOR UR: YELLOW
CREAT SERPL-MCNC: 0.8 MG/DL (ref 0.6–1.1)
DEPRECATED RDW RBC AUTO: 15.5 % (ref 12.4–15.4)
EOSINOPHIL # BLD: 0.2 K/UL (ref 0–0.6)
EOSINOPHIL NFR BLD: 1.4 %
EPI CELLS #/AREA URNS AUTO: 6 /HPF (ref 0–5)
GFR SERPLBLD CREATININE-BSD FMLA CKD-EPI: >60 ML/MIN/{1.73_M2}
GLUCOSE SERPL-MCNC: 96 MG/DL (ref 70–99)
GLUCOSE UR STRIP.AUTO-MCNC: NEGATIVE MG/DL
HCG SERPL QL: NEGATIVE
HCT VFR BLD AUTO: 36.9 % (ref 36–48)
HGB BLD-MCNC: 11.8 G/DL (ref 12–16)
HGB UR QL STRIP.AUTO: ABNORMAL
HYALINE CASTS #/AREA URNS AUTO: 0 /LPF (ref 0–8)
INR PPP: 0.98 (ref 0.84–1.16)
KETONES UR STRIP.AUTO-MCNC: ABNORMAL MG/DL
LACTATE BLDV-SCNC: 1.6 MMOL/L (ref 0.4–1.9)
LEUKOCYTE ESTERASE UR QL STRIP.AUTO: NEGATIVE
LIPASE SERPL-CCNC: 23 U/L (ref 13–60)
LYMPHOCYTES # BLD: 2.7 K/UL (ref 1–5.1)
LYMPHOCYTES NFR BLD: 23.8 %
MCH RBC QN AUTO: 27.5 PG (ref 26–34)
MCHC RBC AUTO-ENTMCNC: 31.9 G/DL (ref 31–36)
MCV RBC AUTO: 86.3 FL (ref 80–100)
MONOCYTES # BLD: 0.6 K/UL (ref 0–1.3)
MONOCYTES NFR BLD: 5.7 %
NEUTROPHILS # BLD: 7.8 K/UL (ref 1.7–7.7)
NEUTROPHILS NFR BLD: 68.7 %
NITRITE UR QL STRIP.AUTO: POSITIVE
PH UR STRIP.AUTO: 5.5 [PH] (ref 5–8)
PLATELET # BLD AUTO: 465 K/UL (ref 135–450)
PMV BLD AUTO: 7.4 FL (ref 5–10.5)
POTASSIUM SERPL-SCNC: 3.9 MMOL/L (ref 3.5–5.1)
PROT SERPL-MCNC: 7.7 G/DL (ref 6.4–8.2)
PROT UR STRIP.AUTO-MCNC: ABNORMAL MG/DL
PROTHROMBIN TIME: 13 SEC (ref 11.5–14.8)
RBC # BLD AUTO: 4.28 M/UL (ref 4–5.2)
RBC CLUMPS #/AREA URNS AUTO: 2 /HPF (ref 0–4)
SODIUM SERPL-SCNC: 139 MMOL/L (ref 136–145)
SP GR UR STRIP.AUTO: >=1.03 (ref 1–1.03)
UA COMPLETE W REFLEX CULTURE PNL UR: ABNORMAL
UA DIPSTICK W REFLEX MICRO PNL UR: YES
URN SPEC COLLECT METH UR: ABNORMAL
UROBILINOGEN UR STRIP-ACNC: 1 E.U./DL
WBC # BLD AUTO: 11.4 K/UL (ref 4–11)
WBC #/AREA URNS AUTO: 7 /HPF (ref 0–5)

## 2023-06-20 PROCEDURE — 74176 CT ABD & PELVIS W/O CONTRAST: CPT

## 2023-06-20 PROCEDURE — 84703 CHORIONIC GONADOTROPIN ASSAY: CPT

## 2023-06-20 PROCEDURE — 83690 ASSAY OF LIPASE: CPT

## 2023-06-20 PROCEDURE — 99284 EMERGENCY DEPT VISIT MOD MDM: CPT

## 2023-06-20 PROCEDURE — 85610 PROTHROMBIN TIME: CPT

## 2023-06-20 PROCEDURE — 71046 X-RAY EXAM CHEST 2 VIEWS: CPT

## 2023-06-20 PROCEDURE — 6370000000 HC RX 637 (ALT 250 FOR IP): Performed by: EMERGENCY MEDICINE

## 2023-06-20 PROCEDURE — 80053 COMPREHEN METABOLIC PANEL: CPT

## 2023-06-20 PROCEDURE — 81001 URINALYSIS AUTO W/SCOPE: CPT

## 2023-06-20 PROCEDURE — 83605 ASSAY OF LACTIC ACID: CPT

## 2023-06-20 PROCEDURE — 85025 COMPLETE CBC W/AUTO DIFF WBC: CPT

## 2023-06-20 RX ORDER — FAMOTIDINE 20 MG/1
20 TABLET, FILM COATED ORAL 2 TIMES DAILY
Qty: 60 TABLET | Refills: 0 | Status: SHIPPED | OUTPATIENT
Start: 2023-06-20

## 2023-06-20 RX ORDER — PANTOPRAZOLE SODIUM 40 MG/1
40 TABLET, DELAYED RELEASE ORAL ONCE
Status: COMPLETED | OUTPATIENT
Start: 2023-06-20 | End: 2023-06-20

## 2023-06-20 RX ORDER — ONDANSETRON 4 MG/1
4 TABLET, ORALLY DISINTEGRATING ORAL ONCE
Status: COMPLETED | OUTPATIENT
Start: 2023-06-20 | End: 2023-06-20

## 2023-06-20 RX ORDER — MAGNESIUM HYDROXIDE/ALUMINUM HYDROXICE/SIMETHICONE 120; 1200; 1200 MG/30ML; MG/30ML; MG/30ML
SUSPENSION ORAL
Status: DISCONTINUED
Start: 2023-06-20 | End: 2023-06-21 | Stop reason: HOSPADM

## 2023-06-20 RX ORDER — ONDANSETRON 4 MG/1
4 TABLET, FILM COATED ORAL EVERY 8 HOURS PRN
Qty: 20 TABLET | Refills: 0 | Status: SHIPPED | OUTPATIENT
Start: 2023-06-20

## 2023-06-20 RX ADMIN — ONDANSETRON 4 MG: 4 TABLET, ORALLY DISINTEGRATING ORAL at 21:51

## 2023-06-20 RX ADMIN — PANTOPRAZOLE SODIUM 40 MG: 40 TABLET, DELAYED RELEASE ORAL at 21:51

## 2023-06-20 RX ADMIN — ALUMINUM HYDROXIDE, MAGNESIUM HYDROXIDE, AND SIMETHICONE: 200; 200; 20 SUSPENSION ORAL at 21:51

## 2023-06-20 ASSESSMENT — LIFESTYLE VARIABLES
HOW MANY STANDARD DRINKS CONTAINING ALCOHOL DO YOU HAVE ON A TYPICAL DAY: 1 OR 2
HOW OFTEN DO YOU HAVE A DRINK CONTAINING ALCOHOL: 2-4 TIMES A MONTH

## 2023-06-21 VITALS
OXYGEN SATURATION: 95 % | RESPIRATION RATE: 18 BRPM | HEART RATE: 90 BPM | DIASTOLIC BLOOD PRESSURE: 83 MMHG | TEMPERATURE: 98.6 F | WEIGHT: 293 LBS | BODY MASS INDEX: 47.09 KG/M2 | SYSTOLIC BLOOD PRESSURE: 126 MMHG | HEIGHT: 66 IN

## 2023-06-21 ASSESSMENT — ENCOUNTER SYMPTOMS
COUGH: 0
VOMITING: 1
RHINORRHEA: 0
SHORTNESS OF BREATH: 0
ABDOMINAL PAIN: 1
BLOOD IN STOOL: 0
DIARRHEA: 0
NAUSEA: 1

## 2023-06-21 NOTE — ED PROVIDER NOTES
[facility]        Pt Name: Carli Bailey  MRN: [id number]  [place] 2000  Date of evaluation: 6/20/2023  Provider: Isabell Raphael PA-C  PCP: Chantell Joyner MD  Note Started: 1:20 AM EDT 6/21/23       I have seen and evaluated this patient with my supervising physician No att. providers found. CHIEF COMPLAINT       Chief Complaint   Patient presents with    Abdominal Pain     Pt states she has had abd pain and throwing up blood since yesterday. HISTORY OF PRESENT ILLNESS: 1 or more Elements     History From: Patient  Limitations to history : None    Carli Bailey is a 23 y.o. female who presents to the emergency department today for evaluation for epigastric abdominal pain, nausea and vomiting. The patient states that she started yesterday with a discomfort to her epigastric abdomen, and since then has had multiple episodes of emesis. The patient states that some episodes of emesis were quite forceful, and she noticed \"bright red blood\" and some of the episodes of emesis. The patient states that she only noticed specks of bright red blood and did not notice any full hematemesis. She has no coffee-ground emesis or bilious emesis. The patient states that she otherwise denies any diarrhea. She denies any chest pain. She is no shortness of breath. She has no fever or chills. No cough. She denies any urinary symptoms, no other complaints    Nursing Notes were all reviewed and agreed with or any disagreements were addressed in the HPI. REVIEW OF SYSTEMS :      Review of Systems   Constitutional:  Negative for activity change, appetite change, chills and fever. HENT:  Negative for congestion and rhinorrhea. Respiratory:  Negative for cough and shortness of breath. Cardiovascular:  Negative for chest pain. Gastrointestinal:  Positive for abdominal pain, nausea and vomiting.  Negative for blood in stool
worsen      Critical care time:  None    DISCLAIMER: This chart was created using Dragon dictation software. Efforts were made by me to ensure accuracy, however some errors may be present due to limitations of this technology and occasionally words are not transcribed correctly.         Thi Deluna MD  06/21/23 2955

## 2023-06-25 ENCOUNTER — E-VISIT (OUTPATIENT)
Dept: PRIMARY CARE CLINIC | Age: 23
End: 2023-06-25
Payer: COMMERCIAL

## 2023-06-25 DIAGNOSIS — J45.20 MILD INTERMITTENT ASTHMA WITHOUT COMPLICATION: Primary | ICD-10-CM

## 2023-06-25 PROCEDURE — 99421 OL DIG E/M SVC 5-10 MIN: CPT | Performed by: NURSE PRACTITIONER

## 2023-06-25 RX ORDER — ALBUTEROL SULFATE 90 UG/1
2 AEROSOL, METERED RESPIRATORY (INHALATION) EVERY 6 HOURS PRN
Qty: 18 G | Refills: 3 | Status: SHIPPED | OUTPATIENT
Start: 2023-06-25

## 2023-06-25 ASSESSMENT — LIFESTYLE VARIABLES: SMOKING_STATUS: NO, I'VE NEVER SMOKED

## 2023-06-27 ENCOUNTER — HOSPITAL ENCOUNTER (EMERGENCY)
Age: 23
Discharge: PSYCHIATRIC OR SUBSTANCE USE FACILITY | End: 2023-06-28
Attending: EMERGENCY MEDICINE
Payer: COMMERCIAL

## 2023-06-27 DIAGNOSIS — R45.851 SUICIDAL IDEATION: Primary | ICD-10-CM

## 2023-06-27 LAB
ALBUMIN SERPL-MCNC: 3.6 G/DL (ref 3.4–5)
ALBUMIN/GLOB SERPL: 0.9 {RATIO} (ref 1.1–2.2)
ALP SERPL-CCNC: 63 U/L (ref 40–129)
ALT SERPL-CCNC: 18 U/L (ref 10–40)
AMPHETAMINES UR QL SCN>1000 NG/ML: NORMAL
ANION GAP SERPL CALCULATED.3IONS-SCNC: 15 MMOL/L (ref 3–16)
APAP SERPL-MCNC: <5 UG/ML (ref 10–30)
AST SERPL-CCNC: 16 U/L (ref 15–37)
BARBITURATES UR QL SCN>200 NG/ML: NORMAL
BASOPHILS # BLD: 0.1 K/UL (ref 0–0.2)
BASOPHILS NFR BLD: 0.7 %
BENZODIAZ UR QL SCN>200 NG/ML: NORMAL
BILIRUB SERPL-MCNC: <0.2 MG/DL (ref 0–1)
BUN SERPL-MCNC: 12 MG/DL (ref 7–20)
CALCIUM SERPL-MCNC: 8.9 MG/DL (ref 8.3–10.6)
CANNABINOIDS UR QL SCN>50 NG/ML: NORMAL
CHLORIDE SERPL-SCNC: 105 MMOL/L (ref 99–110)
CO2 SERPL-SCNC: 22 MMOL/L (ref 21–32)
COCAINE UR QL SCN: NORMAL
CREAT SERPL-MCNC: 0.7 MG/DL (ref 0.6–1.1)
DEPRECATED RDW RBC AUTO: 14.6 % (ref 12.4–15.4)
DRUG SCREEN COMMENT UR-IMP: NORMAL
EOSINOPHIL # BLD: 0.2 K/UL (ref 0–0.6)
EOSINOPHIL NFR BLD: 1.5 %
ETHANOLAMINE SERPL-MCNC: NORMAL MG/DL (ref 0–0.08)
FENTANYL SCREEN, URINE: NORMAL
GFR SERPLBLD CREATININE-BSD FMLA CKD-EPI: >60 ML/MIN/{1.73_M2}
GLUCOSE SERPL-MCNC: 119 MG/DL (ref 70–99)
HCG SERPL QL: NEGATIVE
HCT VFR BLD AUTO: 36.1 % (ref 36–48)
HGB BLD-MCNC: 11.8 G/DL (ref 12–16)
LYMPHOCYTES # BLD: 2.4 K/UL (ref 1–5.1)
LYMPHOCYTES NFR BLD: 22.4 %
MCH RBC QN AUTO: 28.1 PG (ref 26–34)
MCHC RBC AUTO-ENTMCNC: 32.6 G/DL (ref 31–36)
MCV RBC AUTO: 86.4 FL (ref 80–100)
METHADONE UR QL SCN>300 NG/ML: NORMAL
MONOCYTES # BLD: 0.4 K/UL (ref 0–1.3)
MONOCYTES NFR BLD: 4 %
NEUTROPHILS # BLD: 7.8 K/UL (ref 1.7–7.7)
NEUTROPHILS NFR BLD: 71.4 %
OPIATES UR QL SCN>300 NG/ML: NORMAL
OXYCODONE UR QL SCN: NORMAL
PCP UR QL SCN>25 NG/ML: NORMAL
PH UR STRIP: 6 [PH]
PLATELET # BLD AUTO: 419 K/UL (ref 135–450)
PMV BLD AUTO: 8.3 FL (ref 5–10.5)
POTASSIUM SERPL-SCNC: 4.5 MMOL/L (ref 3.5–5.1)
PROT SERPL-MCNC: 7.4 G/DL (ref 6.4–8.2)
RBC # BLD AUTO: 4.18 M/UL (ref 4–5.2)
REASON FOR REJECTION: NORMAL
REJECTED TEST: NORMAL
SALICYLATES SERPL-MCNC: 1.9 MG/DL (ref 15–30)
SARS-COV-2 RDRP RESP QL NAA+PROBE: NOT DETECTED
SODIUM SERPL-SCNC: 142 MMOL/L (ref 136–145)
WBC # BLD AUTO: 10.9 K/UL (ref 4–11)

## 2023-06-27 PROCEDURE — 80143 DRUG ASSAY ACETAMINOPHEN: CPT

## 2023-06-27 PROCEDURE — 82077 ASSAY SPEC XCP UR&BREATH IA: CPT

## 2023-06-27 PROCEDURE — 99285 EMERGENCY DEPT VISIT HI MDM: CPT

## 2023-06-27 PROCEDURE — 87635 SARS-COV-2 COVID-19 AMP PRB: CPT

## 2023-06-27 PROCEDURE — 84703 CHORIONIC GONADOTROPIN ASSAY: CPT

## 2023-06-27 PROCEDURE — 85025 COMPLETE CBC W/AUTO DIFF WBC: CPT

## 2023-06-27 PROCEDURE — 36415 COLL VENOUS BLD VENIPUNCTURE: CPT

## 2023-06-27 PROCEDURE — 80179 DRUG ASSAY SALICYLATE: CPT

## 2023-06-27 PROCEDURE — 80053 COMPREHEN METABOLIC PANEL: CPT

## 2023-06-27 PROCEDURE — 80307 DRUG TEST PRSMV CHEM ANLYZR: CPT

## 2023-06-27 RX ORDER — NAPROXEN 500 MG/1
500 TABLET ORAL 2 TIMES DAILY WITH MEALS
COMMUNITY

## 2023-06-27 RX ORDER — HYDROXYZINE HYDROCHLORIDE 10 MG/1
10 TABLET, FILM COATED ORAL EVERY 8 HOURS PRN
COMMUNITY
Start: 2023-04-11

## 2023-06-27 RX ORDER — TRAZODONE HYDROCHLORIDE 50 MG/1
50 TABLET ORAL NIGHTLY PRN
COMMUNITY
Start: 2023-03-21

## 2023-06-27 ASSESSMENT — ENCOUNTER SYMPTOMS
NAUSEA: 0
DIARRHEA: 0
SHORTNESS OF BREATH: 0
CHEST TIGHTNESS: 0
VOMITING: 0
ABDOMINAL PAIN: 0

## 2023-06-28 VITALS
WEIGHT: 293 LBS | SYSTOLIC BLOOD PRESSURE: 111 MMHG | BODY MASS INDEX: 50.84 KG/M2 | RESPIRATION RATE: 18 BRPM | OXYGEN SATURATION: 97 % | DIASTOLIC BLOOD PRESSURE: 60 MMHG | HEART RATE: 80 BPM | TEMPERATURE: 96.2 F

## 2023-09-26 RX ORDER — ALBUTEROL SULFATE 90 UG/1
AEROSOL, METERED RESPIRATORY (INHALATION)
Qty: 18 G | Refills: 10 | OUTPATIENT
Start: 2023-09-26

## 2023-09-28 SDOH — ECONOMIC STABILITY: FOOD INSECURITY: WITHIN THE PAST 12 MONTHS, THE FOOD YOU BOUGHT JUST DIDN'T LAST AND YOU DIDN'T HAVE MONEY TO GET MORE.: SOMETIMES TRUE

## 2023-09-28 SDOH — ECONOMIC STABILITY: FOOD INSECURITY: WITHIN THE PAST 12 MONTHS, YOU WORRIED THAT YOUR FOOD WOULD RUN OUT BEFORE YOU GOT MONEY TO BUY MORE.: SOMETIMES TRUE

## 2023-09-28 SDOH — ECONOMIC STABILITY: HOUSING INSECURITY
IN THE LAST 12 MONTHS, WAS THERE A TIME WHEN YOU DID NOT HAVE A STEADY PLACE TO SLEEP OR SLEPT IN A SHELTER (INCLUDING NOW)?: YES

## 2023-09-28 SDOH — ECONOMIC STABILITY: TRANSPORTATION INSECURITY
IN THE PAST 12 MONTHS, HAS LACK OF TRANSPORTATION KEPT YOU FROM MEETINGS, WORK, OR FROM GETTING THINGS NEEDED FOR DAILY LIVING?: NO

## 2023-09-28 SDOH — ECONOMIC STABILITY: INCOME INSECURITY: HOW HARD IS IT FOR YOU TO PAY FOR THE VERY BASICS LIKE FOOD, HOUSING, MEDICAL CARE, AND HEATING?: NOT HARD AT ALL

## 2023-09-28 ASSESSMENT — PATIENT HEALTH QUESTIONNAIRE - PHQ9
4. FEELING TIRED OR HAVING LITTLE ENERGY: NEARLY EVERY DAY
10. IF YOU CHECKED OFF ANY PROBLEMS, HOW DIFFICULT HAVE THESE PROBLEMS MADE IT FOR YOU TO DO YOUR WORK, TAKE CARE OF THINGS AT HOME, OR GET ALONG WITH OTHER PEOPLE: EXTREMELY DIFFICULT
9. THOUGHTS THAT YOU WOULD BE BETTER OFF DEAD, OR OF HURTING YOURSELF: SEVERAL DAYS
8. MOVING OR SPEAKING SO SLOWLY THAT OTHER PEOPLE COULD HAVE NOTICED. OR THE OPPOSITE, BEING SO FIGETY OR RESTLESS THAT YOU HAVE BEEN MOVING AROUND A LOT MORE THAN USUAL: 2
SUM OF ALL RESPONSES TO PHQ QUESTIONS 1-9: 24
2. FEELING DOWN, DEPRESSED OR HOPELESS: 3
SUM OF ALL RESPONSES TO PHQ QUESTIONS 1-9: 24
3. TROUBLE FALLING OR STAYING ASLEEP: 3
SUM OF ALL RESPONSES TO PHQ QUESTIONS 1-9: 23
7. TROUBLE CONCENTRATING ON THINGS, SUCH AS READING THE NEWSPAPER OR WATCHING TELEVISION: NEARLY EVERY DAY
5. POOR APPETITE OR OVEREATING: NEARLY EVERY DAY
3. TROUBLE FALLING OR STAYING ASLEEP: NEARLY EVERY DAY
10. IF YOU CHECKED OFF ANY PROBLEMS, HOW DIFFICULT HAVE THESE PROBLEMS MADE IT FOR YOU TO DO YOUR WORK, TAKE CARE OF THINGS AT HOME, OR GET ALONG WITH OTHER PEOPLE: 3
5. POOR APPETITE OR OVEREATING: 3
SUM OF ALL RESPONSES TO PHQ QUESTIONS 1-9: 24
8. MOVING OR SPEAKING SO SLOWLY THAT OTHER PEOPLE COULD HAVE NOTICED. OR THE OPPOSITE - BEING SO FIDGETY OR RESTLESS THAT YOU HAVE BEEN MOVING AROUND A LOT MORE THAN USUAL: MORE THAN HALF THE DAYS
1. LITTLE INTEREST OR PLEASURE IN DOING THINGS: 3
6. FEELING BAD ABOUT YOURSELF - OR THAT YOU ARE A FAILURE OR HAVE LET YOURSELF OR YOUR FAMILY DOWN: 3
6. FEELING BAD ABOUT YOURSELF - OR THAT YOU ARE A FAILURE OR HAVE LET YOURSELF OR YOUR FAMILY DOWN: NEARLY EVERY DAY
4. FEELING TIRED OR HAVING LITTLE ENERGY: 3
SUM OF ALL RESPONSES TO PHQ9 QUESTIONS 1 & 2: 6
1. LITTLE INTEREST OR PLEASURE IN DOING THINGS: NEARLY EVERY DAY
7. TROUBLE CONCENTRATING ON THINGS, SUCH AS READING THE NEWSPAPER OR WATCHING TELEVISION: 3
SUM OF ALL RESPONSES TO PHQ QUESTIONS 1-9: 24
9. THOUGHTS THAT YOU WOULD BE BETTER OFF DEAD, OR OF HURTING YOURSELF: 1
2. FEELING DOWN, DEPRESSED OR HOPELESS: NEARLY EVERY DAY

## 2023-09-28 ASSESSMENT — COLUMBIA-SUICIDE SEVERITY RATING SCALE - C-SSRS
7. DID THIS OCCUR IN THE LAST THREE MONTHS: YES
1. IN THE PAST MONTH, HAVE YOU WISHED YOU WERE DEAD OR WISHED YOU COULD GO TO SLEEP AND NOT WAKE UP?: YES
2. IN THE PAST MONTH, HAVE YOU ACTUALLY HAD ANY THOUGHTS OF KILLING YOURSELF?: NO
6. IN YOUR LIFETIME, HAVE YOU EVER DONE ANYTHING, STARTED TO DO ANYTHING, OR PREPARED TO DO ANYTHING TO END YOUR LIFE?: YES

## 2023-09-29 ENCOUNTER — OFFICE VISIT (OUTPATIENT)
Dept: PRIMARY CARE CLINIC | Age: 23
End: 2023-09-29
Payer: COMMERCIAL

## 2023-09-29 VITALS
OXYGEN SATURATION: 97 % | HEART RATE: 102 BPM | WEIGHT: 293 LBS | DIASTOLIC BLOOD PRESSURE: 78 MMHG | SYSTOLIC BLOOD PRESSURE: 110 MMHG | BODY MASS INDEX: 51 KG/M2

## 2023-09-29 DIAGNOSIS — G47.30 SLEEP APNEA, UNSPECIFIED TYPE: Primary | ICD-10-CM

## 2023-09-29 DIAGNOSIS — F31.9 BIPOLAR 1 DISORDER (HCC): ICD-10-CM

## 2023-09-29 PROCEDURE — 1036F TOBACCO NON-USER: CPT | Performed by: INTERNAL MEDICINE

## 2023-09-29 PROCEDURE — G8417 CALC BMI ABV UP PARAM F/U: HCPCS | Performed by: INTERNAL MEDICINE

## 2023-09-29 PROCEDURE — 99213 OFFICE O/P EST LOW 20 MIN: CPT | Performed by: INTERNAL MEDICINE

## 2023-09-29 PROCEDURE — G8427 DOCREV CUR MEDS BY ELIG CLIN: HCPCS | Performed by: INTERNAL MEDICINE

## 2023-09-29 ASSESSMENT — PATIENT HEALTH QUESTIONNAIRE - PHQ9
3. TROUBLE FALLING OR STAYING ASLEEP: 3
SUM OF ALL RESPONSES TO PHQ QUESTIONS 1-9: 22
7. TROUBLE CONCENTRATING ON THINGS, SUCH AS READING THE NEWSPAPER OR WATCHING TELEVISION: 3
8. MOVING OR SPEAKING SO SLOWLY THAT OTHER PEOPLE COULD HAVE NOTICED. OR THE OPPOSITE, BEING SO FIGETY OR RESTLESS THAT YOU HAVE BEEN MOVING AROUND A LOT MORE THAN USUAL: 1
SUM OF ALL RESPONSES TO PHQ QUESTIONS 1-9: 22
SUM OF ALL RESPONSES TO PHQ QUESTIONS 1-9: 21
4. FEELING TIRED OR HAVING LITTLE ENERGY: 3
SUM OF ALL RESPONSES TO PHQ QUESTIONS 1-9: 22
10. IF YOU CHECKED OFF ANY PROBLEMS, HOW DIFFICULT HAVE THESE PROBLEMS MADE IT FOR YOU TO DO YOUR WORK, TAKE CARE OF THINGS AT HOME, OR GET ALONG WITH OTHER PEOPLE: 3
9. THOUGHTS THAT YOU WOULD BE BETTER OFF DEAD, OR OF HURTING YOURSELF: 1
1. LITTLE INTEREST OR PLEASURE IN DOING THINGS: 3
5. POOR APPETITE OR OVEREATING: 3
6. FEELING BAD ABOUT YOURSELF - OR THAT YOU ARE A FAILURE OR HAVE LET YOURSELF OR YOUR FAMILY DOWN: 2
2. FEELING DOWN, DEPRESSED OR HOPELESS: 3
SUM OF ALL RESPONSES TO PHQ9 QUESTIONS 1 & 2: 6

## 2023-09-29 ASSESSMENT — COLUMBIA-SUICIDE SEVERITY RATING SCALE - C-SSRS
6. HAVE YOU EVER DONE ANYTHING, STARTED TO DO ANYTHING, OR PREPARED TO DO ANYTHING TO END YOUR LIFE?: NO
1. WITHIN THE PAST MONTH, HAVE YOU WISHED YOU WERE DEAD OR WISHED YOU COULD GO TO SLEEP AND NOT WAKE UP?: NO
2. HAVE YOU ACTUALLY HAD ANY THOUGHTS OF KILLING YOURSELF?: NO

## 2023-11-08 ENCOUNTER — OFFICE VISIT (OUTPATIENT)
Dept: PULMONOLOGY | Age: 23
End: 2023-11-08
Payer: COMMERCIAL

## 2023-11-08 VITALS
OXYGEN SATURATION: 96 % | HEART RATE: 102 BPM | SYSTOLIC BLOOD PRESSURE: 120 MMHG | RESPIRATION RATE: 14 BRPM | HEIGHT: 66 IN | WEIGHT: 293 LBS | BODY MASS INDEX: 47.09 KG/M2 | DIASTOLIC BLOOD PRESSURE: 78 MMHG

## 2023-11-08 DIAGNOSIS — E66.01 CLASS 3 SEVERE OBESITY WITH BODY MASS INDEX (BMI) OF 50.0 TO 59.9 IN ADULT, UNSPECIFIED OBESITY TYPE, UNSPECIFIED WHETHER SERIOUS COMORBIDITY PRESENT (HCC): ICD-10-CM

## 2023-11-08 DIAGNOSIS — G47.10 HYPERSOMNIA: Primary | ICD-10-CM

## 2023-11-08 DIAGNOSIS — R06.83 SNORING: ICD-10-CM

## 2023-11-08 PROCEDURE — 99244 OFF/OP CNSLTJ NEW/EST MOD 40: CPT | Performed by: STUDENT IN AN ORGANIZED HEALTH CARE EDUCATION/TRAINING PROGRAM

## 2023-11-08 PROCEDURE — G8484 FLU IMMUNIZE NO ADMIN: HCPCS | Performed by: STUDENT IN AN ORGANIZED HEALTH CARE EDUCATION/TRAINING PROGRAM

## 2023-11-08 PROCEDURE — G8427 DOCREV CUR MEDS BY ELIG CLIN: HCPCS | Performed by: STUDENT IN AN ORGANIZED HEALTH CARE EDUCATION/TRAINING PROGRAM

## 2023-11-08 PROCEDURE — G8417 CALC BMI ABV UP PARAM F/U: HCPCS | Performed by: STUDENT IN AN ORGANIZED HEALTH CARE EDUCATION/TRAINING PROGRAM

## 2023-11-08 RX ORDER — BUPROPION HYDROCHLORIDE 150 MG/1
TABLET ORAL
COMMUNITY
Start: 2023-10-25

## 2023-11-08 RX ORDER — PRAZOSIN HYDROCHLORIDE 1 MG/1
CAPSULE ORAL
COMMUNITY
Start: 2023-10-25

## 2023-11-08 RX ORDER — BUSPIRONE HYDROCHLORIDE 7.5 MG/1
TABLET ORAL
COMMUNITY
Start: 2023-10-25

## 2023-11-08 ASSESSMENT — SLEEP AND FATIGUE QUESTIONNAIRES
HOW LIKELY ARE YOU TO NOD OFF OR FALL ASLEEP IN A CAR, WHILE STOPPED FOR A FEW MINUTES IN TRAFFIC: 1
HOW LIKELY ARE YOU TO NOD OFF OR FALL ASLEEP WHILE SITTING AND TALKING TO SOMEONE: 1
NECK CIRCUMFERENCE (INCHES): 17.5
ESS TOTAL SCORE: 18
HOW LIKELY ARE YOU TO NOD OFF OR FALL ASLEEP WHILE SITTING INACTIVE IN A PUBLIC PLACE: 2
HOW LIKELY ARE YOU TO NOD OFF OR FALL ASLEEP WHILE SITTING QUIETLY AFTER LUNCH WITHOUT ALCOHOL: 2
HOW LIKELY ARE YOU TO NOD OFF OR FALL ASLEEP WHILE WATCHING TV: 3
HOW LIKELY ARE YOU TO NOD OFF OR FALL ASLEEP WHILE SITTING AND READING: 3
HOW LIKELY ARE YOU TO NOD OFF OR FALL ASLEEP WHEN YOU ARE A PASSENGER IN A CAR FOR AN HOUR WITHOUT A BREAK: 3
HOW LIKELY ARE YOU TO NOD OFF OR FALL ASLEEP WHILE LYING DOWN TO REST IN THE AFTERNOON WHEN CIRCUMSTANCES PERMIT: 3

## 2023-11-08 NOTE — PATIENT INSTRUCTIONS
notice improvement in sleepiness within the first week or two. Several second line therapies exist for JAVID. Behavioral therapy for JAVID includes weight loss and positional therapy which is avoidance of sleeping on ones back. Significant improvement in JAVID can occur with as little as 10% weight loss. Dental devices that hold the lower jaw or tongue forward during sleep can also relieve JAVID. These devices are not always as effective as CPAP but are preferred by some patients. Surgical procedures are also options. But it is often hard to predict how effective a surgical treatment will be in reducing or eliminating sleep apnea. Additional Information  American Academy of Sleep Medicine (www.aasmnet. org)    [facility] (www.sleepfoundation. org)    American Sleep Apnea Association (Abad Burgin. org)    National Heart, Lung, and Blood [place] (www.nhlbi.nih.gov)    [employer]pto[employer] (www.uptodate.com/patients)  Weight Loss      Weight Loss is an important part of treating obstructive sleep apnea. Being at a healthy weight is improtant to prevent and/or facilitate treatment of chronic conditions such as heart disease and diabetes in addition to obstructive sleep apnea. This is optimally achieved through a healthy diet and exercise program that can be maintained long term. Drowsy Driving Tips  '  These suggestions will help prevent you from the risk of drowsy driving. 1.  If you feel tired or drowsy do not drive. Sleepiness is a major cause of motor vehicle accidents and accounts for 40% of all fatal crashes reported on the [address] No matter how much you think you can control sleepiness, you can't.    2. Ensure you follow your doctor's advice about the treatment for your sleep disorder. For example, if you have sleep apnea and use CPAP, ensure you use it fully the night before your trip. 3. Get a good night's sleep before driving.   Do not reduce your sleep time if

## 2023-11-20 ENCOUNTER — HOSPITAL ENCOUNTER (OUTPATIENT)
Dept: SLEEP CENTER | Age: 23
Discharge: HOME | End: 2023-11-20
Payer: COMMERCIAL

## 2023-11-20 DIAGNOSIS — G47.10 HYPERSOMNIA: ICD-10-CM

## 2023-11-20 DIAGNOSIS — R06.83 SNORING: ICD-10-CM

## 2023-11-20 DIAGNOSIS — E66.01 CLASS 3 SEVERE OBESITY WITH BODY MASS INDEX (BMI) OF 50.0 TO 59.9 IN ADULT, UNSPECIFIED OBESITY TYPE, UNSPECIFIED WHETHER SERIOUS COMORBIDITY PRESENT (HCC): ICD-10-CM

## 2023-11-20 PROCEDURE — 95806 SLEEP STUDY UNATT&RESP EFFT: CPT

## 2023-12-08 ENCOUNTER — TELEPHONE (OUTPATIENT)
Dept: PULMONOLOGY | Age: 23
End: 2023-12-08

## 2023-12-08 DIAGNOSIS — G47.33 OSA (OBSTRUCTIVE SLEEP APNEA): Primary | ICD-10-CM

## 2023-12-08 NOTE — PROGRESS NOTES
Diagnosis: [x] JAVID  (G47.33) [] CSA (G47.31) []  Other:__________________   Length of Need: [] 13 months [x]  99 Months                                          []  Other:__________________   Machine (PERRY): [] Respironics Auto (with modem for remote monitoring)       [] Other:____________________    [x]  ResMed Auto (with modem for remote monitoring)    [x]  CPAP () [] Bilevel ()   Mode: Mode:   [x] Auto [] Fixed [] Auto [] Spontaneous   Pmin:_____5____cmH2O      Pmax:_____15____cmH2O   P:_________cmH2O    EPAPmin:__________cmH2O IPAP:__________cmH2O     IPAPmax:__________cmH2O EPAP:__________cmH2O     PSmin:_______  PSmax:_______       (ResMed) PS:_________     Flex/EPR - 3 full time                          Ramp time: 30 min Flex/EPR - 3 full time                 Ramp time: 30 min   Ramp Pressure:_____4______cmH2O Ramp Pressure:____________cmH2O         Humidifier: [x] Heated ()                                               [] Passive     [x] Water chamber replacement ()/ 1 per 6 months   Mask:   [x] Nasal () /1 per 3 months [x] Full Face () /1 per 3 months   [x] Patient choice -Size and fit mask [x] Patient Choice - Size and fit mask   [x] Dispense:   [x] Dispense:  [x] Dispense:   [x] Dispense:    [x] Headgear () / 1 per 3 months [x] Headgear () / 1 per 3 months   [x] Replacement Nasal Cushion ()/2 per month [x] Interface Replacement ()/1 per month   [x] Replacement Nasal Pillows ()/2 per month       Tubing: [x] Heated ()/1 per 3 months                           [] Standard ()/1 per 3 months  [] Other:____________________   Filters: [x] Non-disposable ()/1 per 6 months                 [x] Ultra-Fine, Disposable ()/2 per month     Miscellaneous: [] Chin Strap ()/ 1 per 6months      [] Other:__________________________________         Start Order Date: 12/08/23    MEDICAL JUSTIFICATION:  I, the undersigned, certify that the above

## 2023-12-08 NOTE — TELEPHONE ENCOUNTER
Please inform patient that her sleep study showed severe sleep apnea and that she stopped breathing or her breathing was inadequate about 38 times for every hour of sleep. Her blood oxygen also dropped as low as 74% during the night. Because of insurance requirements, she will have to complete PAP titration. If she agrees, I will order another sleep test with CPAP. She will be started on CPAP during sleep at the lab while we adjust the machine pressure throughout the night. The goal at the end of the night is for us to have an idea of appropriate CPAP machine, mask and pressure to order for patient. If she agrees to the plan, I will place an order and someone will contact her to schedule the appointment for the study. If they have any questions, they can let you know or message me via [employer].       Thanks  Monique Orlando MD

## 2023-12-13 PROBLEM — G47.10 HYPERSOMNIA: Status: ACTIVE | Noted: 2023-12-13

## 2023-12-13 NOTE — TELEPHONE ENCOUNTER
Patient is agreeable to having titration study done. Please send order to hospital sleep lab for scheduling.

## 2024-01-01 NOTE — PLAN OF CARE
RECORD     [x] Admission Note          [] Progress Note          [] Discharge Summary     Vasu Sanz is a well-appearing male infant born on 2026 at 3:33 PM via vaginal, spontaneous. His mother is a 25 y.o.   . Prenatal serologies were negative except for Rubella non-Immune. GBS was negative. ROM occurred 3h 23m  prior to delivery. Prenatal course complicated by bipolar disorder on buspar & lamictal. Delivery was uncomplicated. Presentation was Vertex. APGAR scores were 8 and 9 at one and five minutes, respectively. Birth Weight: 3.665 kg (8 lb 1.3 oz) which is appropriate for his gestational age. Birth Length: 0.5 m (1' 7.69\"). Birth Head Circumference: 34 cm (13.39\").       History     Mother's Prenatal Labs  ABO / Rh Lab Results   Component Value Date/Time    ABORH O POSITIVE 2024 06:36 AM       HIV NEG 23   RPR / TP-PA NEG 23   Rubella NON-IMMUNE 23   HBsAg NEG 23   C. Trachomatis NEG 23   N. Gonorrhoeae NEG 23   Group B Strep NEG 24     Mother's Medical History  Past Medical History:   Diagnosis Date   • Anemia    • Bipolar 2 disorder (HCC)    • Mental disorder    • Ovarian cyst       Current Outpatient Medications   Medication Instructions   • BUSPIRONE HCL PO 20 mg, Oral   • doxyLAMINE succinate (GNP SLEEP AID) 25 MG tablet Oral, NIGHTLY   • lamoTRIgine 250 MG TB24 Oral   • Prenatal Multivit-Min-Fe-FA (PRENATAL 1 + IRON PO) Oral      Labor Events   Labor: No    Steroids: None   Antibiotics During Labor: No   Rupture Date/Time: 2024 12:10 PM   Rupture Type: AROM   Amniotic Fluid Description: Clear    Amniotic Fluid Odor: None    Labor complications: None    Additional complications:        Delivery Summary  Delivery Type: Vaginal, Spontaneous    Delivery Resuscitation: Bulb Suction;Stimulation    Number of Vessels:  3 Vessels   Cord Events: None   Meconium Stained: Clear [1]   Amniotic Fluid Description: Clear       Problem: Suicide risk  Goal: Provide patient with safe environment  Description: Provide patient with safe environment  Outcome: Ongoing     Problem: Altered Mood, Depressive Behavior:  Goal: Able to verbalize and/or display a decrease in depressive symptoms  Description: Able to verbalize and/or display a decrease in depressive symptoms  Outcome: Ongoing  Goal: Ability to disclose and discuss suicidal ideas will improve  Description: Ability to disclose and discuss suicidal ideas will improve  Outcome: Ongoing  Goal: Absence of self-harm  Description: Absence of self-harm  8/12/2021 1957 by Jolie Loyola RN  Outcome: Ongoing  8/12/2021 1520 by Verito Brewster RN  Outcome: Ongoing     Problem: Falls - Risk of:  Goal: Will remain free from falls  Description: Will remain free from falls  Outcome: Ongoing  Goal: Absence of physical injury  Description: Absence of physical injury  Outcome: Ongoing   Patient has been free of falls thus far this shift She denied any needs. Ho Miller is anticipating being discharged on Friday and denied any needs at this time. She denied SI/HI,A/V hallucinations. She agrees to come to the staff if thoughts of self harm were to occur. She is in agreement to do this. Patient noted to be in dayroom and social with peers, attending groups. Safety checks continue Q 15 minutes through out the shift.     Assessment     Vasu Sanz is a well-appearing infant born at a gestational age of 39w2d . His physical exam is without concerning findings. His vital signs are within acceptable ranges. He has already  & stooled x 3.    **Erythromycin national critical shortage** On 2024, a risk stratification protocol was implemented for [facility] to ensure the highest risk infants receive the available erythromycin ointment. This infant was NOT deemed to be at high risk and did not receive erythromycin ointment prophylaxis.  Parent(s) informed and educated on the signs and symptoms of ophthalmia neonatorum and counseled to seek immediate medical care if any occur.      Plan     - Continue routine  care  - Follow results of pending cord blood evaluation     Family in agreement with plan of care and opportunity for questions provided.      Signed: Lidia Ventura MD

## 2024-01-19 ENCOUNTER — HOSPITAL ENCOUNTER (OUTPATIENT)
Dept: SLEEP CENTER | Age: 24
Discharge: HOME | End: 2024-01-19

## 2024-01-19 DIAGNOSIS — G47.33 OSA (OBSTRUCTIVE SLEEP APNEA): ICD-10-CM

## 2024-01-23 ENCOUNTER — TELEPHONE (OUTPATIENT)
Dept: PULMONOLOGY | Age: 24
End: 2024-01-23

## 2024-01-23 NOTE — PROGRESS NOTES
Diagnosis: [x] JAVID  (G47.33) [] CSA (G47.31) []  Other:__________________   Length of Need: [] 13 months [x]  99 Months                                          []  Other:__________________   Machine (PERRY): [] Respironics Auto (with modem for remote monitoring)       [] Other:____________________    [x]  ResMed Auto (with modem for remote monitoring)    []  CPAP () [x] Bilevel ()   Mode: Mode:   [] Auto [] Fixed [x] Auto [] Spontaneous   Pmin:_________cmH2O      Pmax:_________cmH2O   P:_________cmH2O    EPAPmin:_____12____cmH2O IPAP:__________cmH2O     IPAPmax:______25___cmH2O EPAP:__________cmH2O     PSmin:_______  PSmax:_______       (ResMed) PS:___6______     Flex/EPR - 3 full time                          Ramp time: 30 min Flex/EPR - 3 full time                 Ramp time: 30 min   Ramp Pressure:___________cmH2O Ramp Pressure:____________cmH2O         Humidifier: [x] Heated ()                                               [] Passive     [x] Water chamber replacement ()/ 1 per 6 months   Mask:   [x] Nasal () /1 per 3 months [] Full Face () /1 per 3 months   [x] Patient choice -Size and fit mask [] Patient Choice - Size and fit mask   [x] Dispense: nasal cushion  [] Dispense:  [] Dispense: full face mask  [] Dispense:    [x] Headgear () / 1 per 3 months [] Headgear () / 1 per 3 months   [x] Replacement Nasal Cushion ()/2 per month [] Interface Replacement ()/1 per month   [x] Replacement Nasal Pillows ()/2 per month       Tubing: [x] Heated ()/1 per 3 months                           [] Standard ()/1 per 3 months  [] Other:____________________   Filters: [x] Non-disposable ()/1 per 6 months                 [x] Ultra-Fine, Disposable ()/2 per month     Miscellaneous: [] Chin Strap ()/ 1 per 6months      [] Other:__________________________________         Start Order Date: 01/23/24    MEDICAL JUSTIFICATION:  I, the undersigned, certify

## 2024-01-23 NOTE — TELEPHONE ENCOUNTER
Please inform patient based on the recent titration study, we will proceed with treating her obstructive sleep apnea with auto Bilevel PAP.    I will order a PAP device via a durable medical equipment company of their choice (if no preference, we will go with [employer]) and they will reach out to keep her updated on the process. This will be the company that is going to work with her insurance and provide her the PAP device, along with replacing the mask and other supplies going forward. If they have any questions, they can let you know or message me via [employer]. If patient agrees with plan, please route the PAP order to DME company (order already completed on a separate order encounter). Patient should be scheduled for 31-90 days follow up.    Thanks  Bogdan Spann MD

## 2024-01-23 NOTE — PROGRESS NOTES
Diagnosis: [x] JAVID  (G47.33) [] CSA (G47.31) []  Other:__________________   Length of Need: [] 13 months [x]  99 Months                                          []  Other:__________________   Machine (PERRY): [] Respironics Auto (with modem for remote monitoring)       [] Other:____________________    [x]  ResMed Auto (with modem for remote monitoring)    [x]  CPAP () [] Bilevel ()   Mode: Mode:   [x] Auto [] Fixed [] Auto [] Spontaneous   Pmin:_____5____cmH2O      Pmax:_____15____cmH2O   P:_________cmH2O    EPAPmin:__________cmH2O IPAP:__________cmH2O     IPAPmax:__________cmH2O EPAP:__________cmH2O     PSmin:_______  PSmax:_______       (ResMed) PS:_________     Flex/EPR - 3 full time                          Ramp time: 30 min Flex/EPR - 3 full time                 Ramp time: 30 min   Ramp Pressure:_____4______cmH2O Ramp Pressure:____________cmH2O         Humidifier: [x] Heated ()                                               [] Passive     [x] Water chamber replacement ()/ 1 per 6 months   Mask:   [x] Nasal () /1 per 3 months [] Full Face () /1 per 3 months   [x] Patient choice -Size and fit mask [] Patient Choice - Size and fit mask   [x] Dispense: nasal cushion  [] Dispense:    [] Dispense:    [x] Headgear () / 1 per 3 months [] Headgear () / 1 per 3 months   [x] Replacement Nasal Cushion ()/2 per month [] Interface Replacement ()/1 per month   [] Replacement Nasal Pillows ()/2 per month       Tubing: [x] Heated ()/1 per 3 months                           [] Standard ()/1 per 3 months  [] Other:____________________   Filters: [x] Non-disposable ()/1 per 6 months                 [x] Ultra-Fine, Disposable ()/2 per month     Miscellaneous: [] Chin Strap ()/ 1 per 6months      [] Other:__________________________________         Start Order Date: 01/23/24    MEDICAL JUSTIFICATION:  I, the undersigned, certify that the above prescribed

## 2024-01-25 PROBLEM — G47.33 OSA (OBSTRUCTIVE SLEEP APNEA): Status: ACTIVE | Noted: 2024-01-25

## 2024-01-25 NOTE — TELEPHONE ENCOUNTER
Patient is agreeable to having order sent to [facility].  Order sent.  Patient scheduled for 31-90 day f/u.  Patient instructed to call office if he does not hear from DME in 2 weeks or if he has any issues using the machine once he gets it.

## 2024-02-08 ENCOUNTER — TELEPHONE (OUTPATIENT)
Dept: PRIMARY CARE CLINIC | Age: 24
End: 2024-02-08

## 2024-04-01 ENCOUNTER — E-VISIT (OUTPATIENT)
Dept: PRIMARY CARE CLINIC | Age: 24
End: 2024-04-01
Payer: COMMERCIAL

## 2024-04-01 DIAGNOSIS — J45.20 MILD INTERMITTENT ASTHMA WITHOUT COMPLICATION: Primary | ICD-10-CM

## 2024-04-01 PROCEDURE — 99422 OL DIG E/M SVC 11-20 MIN: CPT | Performed by: NURSE PRACTITIONER

## 2024-04-01 RX ORDER — ALBUTEROL SULFATE 90 UG/1
2 AEROSOL, METERED RESPIRATORY (INHALATION) EVERY 6 HOURS PRN
Qty: 18 G | Refills: 0 | Status: SHIPPED | OUTPATIENT
Start: 2024-04-01

## 2024-04-01 ASSESSMENT — LIFESTYLE VARIABLES: SMOKING_STATUS: NO, I'VE NEVER SMOKED

## 2024-04-02 ENCOUNTER — TELEPHONE (OUTPATIENT)
Dept: PULMONOLOGY | Age: 24
End: 2024-04-02

## 2024-04-02 NOTE — PROGRESS NOTES
Reviewed questionnaire    Reviewed meds/allergies    Dx Asthma    Plan Rx renewed for albuterol inhaler, follow up with PCP for further management    Time spent on visit 11 min

## 2024-04-02 NOTE — TELEPHONE ENCOUNTER
Janet called stating pt was set up on 2/6/24. Also  Dr. Mace is now link in air view for [facility] machine

## 2024-05-31 ENCOUNTER — E-VISIT (OUTPATIENT)
Dept: FAMILY MEDICINE CLINIC | Age: 24
End: 2024-05-31

## 2024-05-31 DIAGNOSIS — R21 RASH AND NONSPECIFIC SKIN ERUPTION: ICD-10-CM

## 2024-05-31 DIAGNOSIS — L73.9 FOLLICULITIS: Primary | ICD-10-CM

## 2024-05-31 RX ORDER — CEPHALEXIN 500 MG/1
500 CAPSULE ORAL 4 TIMES DAILY
Qty: 40 CAPSULE | Refills: 0 | Status: SHIPPED | OUTPATIENT
Start: 2024-05-31

## 2024-05-31 RX ORDER — TRIAMCINOLONE ACETONIDE 1 MG/G
OINTMENT TOPICAL 2 TIMES DAILY
Qty: 30 G | Refills: 0 | Status: SHIPPED | OUTPATIENT
Start: 2024-05-31 | End: 2024-06-07

## 2024-05-31 NOTE — PROGRESS NOTES
E-visit accepted.   Reviewed questions/answers and photo.  Findings c/w folliculitis.   Will Rx abx therapy and topical steroid.    Total length of time: 5-10 min.

## 2024-06-14 ENCOUNTER — HOSPITAL ENCOUNTER (EMERGENCY)
Age: 24
Discharge: HOME | End: 2024-06-14
Payer: COMMERCIAL

## 2024-06-14 ENCOUNTER — APPOINTMENT (OUTPATIENT)
Dept: CT IMAGING | Age: 24
End: 2024-06-14
Payer: COMMERCIAL

## 2024-06-14 ENCOUNTER — APPOINTMENT (OUTPATIENT)
Dept: GENERAL RADIOLOGY | Age: 24
End: 2024-06-14
Payer: COMMERCIAL

## 2024-06-14 VITALS
TEMPERATURE: 97.6 F | BODY MASS INDEX: 47.09 KG/M2 | HEART RATE: 86 BPM | SYSTOLIC BLOOD PRESSURE: 117 MMHG | DIASTOLIC BLOOD PRESSURE: 85 MMHG | HEIGHT: 66 IN | RESPIRATION RATE: 18 BRPM | OXYGEN SATURATION: 96 % | WEIGHT: 293 LBS
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
## 2024-06-14 DIAGNOSIS — S16.1XXA ACUTE STRAIN OF NECK MUSCLE, INITIAL ENCOUNTER: ICD-10-CM

## 2024-06-14 DIAGNOSIS — S93.401A SPRAIN OF RIGHT ANKLE, UNSPECIFIED LIGAMENT, INITIAL ENCOUNTER: ICD-10-CM

## 2024-06-14 PROCEDURE — 73610 X-RAY EXAM OF ANKLE: CPT

## 2024-06-14 PROCEDURE — 99284 EMERGENCY DEPT VISIT MOD MDM: CPT

## 2024-06-14 PROCEDURE — 72125 CT NECK SPINE W/O DYE: CPT

## 2024-06-14 RX ORDER — DICLOFENAC SODIUM 75 MG/1
75 TABLET, DELAYED RELEASE ORAL 2 TIMES DAILY PRN
Qty: 20 TABLET | Refills: 0 | Status: SHIPPED | OUTPATIENT
Start: 2024-06-14

## 2024-06-14 RX ORDER — CYCLOBENZAPRINE HCL 10 MG
10 TABLET ORAL 3 TIMES DAILY PRN
Qty: 21 TABLET | Refills: 0 | Status: SHIPPED | OUTPATIENT
Start: 2024-06-14 | End: 2024-06-24

## 2024-06-14 ASSESSMENT — PAIN DESCRIPTION - PAIN TYPE: TYPE: ACUTE PAIN

## 2024-06-14 ASSESSMENT — PAIN SCALES - GENERAL: PAINLEVEL_OUTOF10: 6

## 2024-06-14 ASSESSMENT — PAIN DESCRIPTION - LOCATION: LOCATION: ANKLE;NECK

## 2024-06-14 ASSESSMENT — PAIN DESCRIPTION - ORIENTATION: ORIENTATION: RIGHT

## 2024-06-14 ASSESSMENT — PAIN - FUNCTIONAL ASSESSMENT: PAIN_FUNCTIONAL_ASSESSMENT: 0-10

## 2024-06-14 NOTE — DISCHARGE INSTR - COC
Continuity of Care Form    Patient Name: Mojgan Curiel   :  2000  MRN:  [id number]    Admit date:  2024  Discharge date:  ***    Code Status Order: Prior   Advance Directives:     Admitting Physician:  No admitting provider for patient encounter.  PCP: Rey Medina MD    Discharging Nurse: ***  Discharging Hospital Unit/Room#: [place] [address]  Discharging Unit Phone Number: ***    Emergency Contact:   Extended Emergency Contact Information  Primary Emergency Contact: yaima cole  Home Phone: [phone]  Relation: Aunt/Uncle  Secondary Emergency Contact: Tri calvo  Home Phone: [phone]  Relation: Friend    Past Surgical History:  History reviewed. No pertinent surgical history.    Immunization History:   Immunization History   Administered Date(s) Administered    DTaP vaccine 2000, 2003    HPV Quadrivalent (Gardasil) 2012    Hep A, HAVRIX, VAQTA, (age 12m-18y), IM, 0.5mL 10/01/2009, 2012    Hib (HbOC) 2000    Influenza A (S2D3-93) Vaccine PF IM 10/24/2009    Influenza Virus Vaccine 10/07/2004, 10/04/2005    Influenza, AFLURIA, FLUZONE, (age 6-35 m), PF 10/06/2015    Influenza, FLUARIX, FLULAVAL, FLUZONE (age 6 mo+) AND AFLURIA, (age 3 y+), PF, 0.5mL 10/10/2016    MMR, PRIORIX, M-M-R II, (age 12m+), SC, 0.5mL 2000, 2003    Meningococcal ACWY, MENACTRA (MenACWY-D), (age 9m-55y), IM, 0.5mL 2011, 2016    Poliovirus, IPOL, (age 6w+), SC/IM, 0.5mL 2000, 2003    TDaP, ADACEL (age 10y-64y), BOOSTRIX (age 10y+), IM, 0.5mL 2011    Varicella, VARIVAX, (age 12m+), SC, 0.5mL 10/01/2009       Active Problems:  Patient Active Problem List   Diagnosis Code    Serotonin syndrome G25.79    Intentional drug overdose (HCC) T50.902A    Bipolar disorder with severe depression (HCC) F31.4    Anxiety disorder F41.9    MDD (major depressive disorder), recurrent episode, moderate (HCC) F33.1    Bipolar depression (HCC) F31.9    Elevated BP

## 2024-06-14 NOTE — ED PROVIDER NOTES
CEPHALEXIN (KEFLEX) 500 MG CAPSULE    Take 1 capsule by mouth 4 times daily    PRAZOSIN (MINIPRESS) 1 MG CAPSULE           ALLERGIES     Banana, Coconut fatty acids, Macadamia nut oil, Nutritional supplements, Pineapple extract, Bee venom, and Other    FAMILYHISTORY       Family History   Problem Relation Age of Onset    Mental Illness Mother     Depression Mother     High Blood Pressure Mother     Miscarriages / Stillbirths Mother     Substance Abuse Mother     No Known Problems Sister     Alcohol Abuse Maternal Grandfather     Cancer Maternal Grandfather     Depression Maternal Grandmother     Alcohol Abuse Paternal Grandmother     Breast Cancer Paternal Aunt         SOCIAL HISTORY       Social History     Tobacco Use    Smoking status: Never    Smokeless tobacco: Never   Vaping Use    Vaping Use: Never used   Substance Use Topics    Alcohol use: Yes     Alcohol/week: 1.0 standard drink of alcohol     Types: 1 Glasses of wine per week     Comment: once every two weeks    Drug use: Yes     Types: Marijuana (Weed)       SCREENINGS        [place] Coma Scale  Eye Opening: Spontaneous  Best Verbal Response: Oriented  Best Motor Response: Obeys commands  [place] Coma Scale Score: 15                CIWA Assessment  BP: 117/85  Pulse: 86           PHYSICAL EXAM  1 or more Elements     ED Triage Vitals [06/14/24 1237]   BP Temp Temp Source Pulse Respirations SpO2 Height Weight - Scale   117/85 97.6 °F (36.4 °C) Oral 86 18 96 % 1.676 m (5' 6\") (!) 148.8 kg (328 lb 1.6 oz)       Physical Exam  Vitals and nursing note reviewed.   Constitutional:       General: She is not in acute distress.     Appearance: She is obese. She is not ill-appearing or toxic-appearing.   HENT:      Head: Normocephalic and atraumatic.      Right Ear: External ear normal.      Left Ear: External ear normal.      Nose: Nose normal.      Mouth/Throat:      Mouth: Mucous membranes are moist.      Pharynx: Oropharynx is clear.   Eyes:

## 2024-10-05 NOTE — TELEPHONE ENCOUNTER
Kenzie, call to let  us know that patient has been admitted at [facility], please be advise courtesy call.   
Alert-The patient is alert, awake and responds to voice. The patient is oriented to time, place, and person. The triage nurse is able to obtain subjective information.

## 2024-12-11 ENCOUNTER — HOSPITAL ENCOUNTER (EMERGENCY)
Age: 24
Discharge: HOME | End: 2024-12-12
Attending: STUDENT IN AN ORGANIZED HEALTH CARE EDUCATION/TRAINING PROGRAM
Payer: COMMERCIAL

## 2024-12-11 ENCOUNTER — APPOINTMENT (OUTPATIENT)
Dept: CT IMAGING | Age: 24
End: 2024-12-11
Payer: COMMERCIAL

## 2024-12-11 DIAGNOSIS — K92.0 HEMATEMESIS WITH NAUSEA: Primary | ICD-10-CM

## 2024-12-11 LAB
ALBUMIN SERPL-MCNC: 4 G/DL (ref 3.4–5)
ALBUMIN/GLOB SERPL: 1 {RATIO} (ref 1.1–2.2)
ALP SERPL-CCNC: 69 U/L (ref 40–129)
ALT SERPL-CCNC: 17 U/L (ref 10–40)
ANION GAP SERPL CALCULATED.3IONS-SCNC: 11 MMOL/L (ref 3–16)
AST SERPL-CCNC: 23 U/L (ref 15–37)
BACTERIA URNS QL MICRO: ABNORMAL /HPF
BASOPHILS # BLD: 0.1 K/UL (ref 0–0.2)
BASOPHILS NFR BLD: 0.9 %
BILIRUB SERPL-MCNC: 0.3 MG/DL (ref 0–1)
BILIRUB UR QL STRIP.AUTO: NEGATIVE
BUN SERPL-MCNC: 16 MG/DL (ref 7–20)
CALCIUM SERPL-MCNC: 9 MG/DL (ref 8.3–10.6)
CHLORIDE SERPL-SCNC: 103 MMOL/L (ref 99–110)
CLARITY UR: ABNORMAL
CO2 SERPL-SCNC: 26 MMOL/L (ref 21–32)
COLOR UR: YELLOW
CREAT SERPL-MCNC: 0.9 MG/DL (ref 0.6–1.1)
DEPRECATED RDW RBC AUTO: 15.3 % (ref 12.4–15.4)
EOSINOPHIL # BLD: 0.1 K/UL (ref 0–0.6)
EOSINOPHIL NFR BLD: 0.7 %
EPI CELLS #/AREA URNS AUTO: 10 /HPF (ref 0–5)
GFR SERPLBLD CREATININE-BSD FMLA CKD-EPI: >90 ML/MIN/{1.73_M2}
GLUCOSE SERPL-MCNC: 86 MG/DL (ref 70–99)
GLUCOSE UR STRIP.AUTO-MCNC: NEGATIVE MG/DL
HCG SERPL QL: NEGATIVE
HCT VFR BLD AUTO: 34.6 % (ref 36–48)
HGB BLD-MCNC: 11.7 G/DL (ref 12–16)
HGB UR QL STRIP.AUTO: NEGATIVE
HYALINE CASTS #/AREA URNS AUTO: 1 /LPF (ref 0–8)
KETONES UR STRIP.AUTO-MCNC: ABNORMAL MG/DL
LACTATE BLDV-SCNC: 1.1 MMOL/L (ref 0.4–1.9)
LEUKOCYTE ESTERASE UR QL STRIP.AUTO: NEGATIVE
LIPASE SERPL-CCNC: 21 U/L (ref 13–60)
LYMPHOCYTES # BLD: 2.8 K/UL (ref 1–5.1)
LYMPHOCYTES NFR BLD: 20.4 %
MCH RBC QN AUTO: 28.2 PG (ref 26–34)
MCHC RBC AUTO-ENTMCNC: 33.9 G/DL (ref 31–36)
MCV RBC AUTO: 83.4 FL (ref 80–100)
MONOCYTES # BLD: 0.6 K/UL (ref 0–1.3)
MONOCYTES NFR BLD: 4.6 %
NEUTROPHILS # BLD: 10 K/UL (ref 1.7–7.7)
NEUTROPHILS NFR BLD: 73.4 %
NITRITE UR QL STRIP.AUTO: NEGATIVE
PH UR STRIP.AUTO: 5.5 [PH] (ref 5–8)
PLATELET # BLD AUTO: 440 K/UL (ref 135–450)
PMV BLD AUTO: 7.9 FL (ref 5–10.5)
POTASSIUM SERPL-SCNC: 4 MMOL/L (ref 3.5–5.1)
PROT SERPL-MCNC: 7.9 G/DL (ref 6.4–8.2)
PROT UR STRIP.AUTO-MCNC: ABNORMAL MG/DL
RBC # BLD AUTO: 4.14 M/UL (ref 4–5.2)
RBC CLUMPS #/AREA URNS AUTO: 1 /HPF (ref 0–4)
REASON FOR REJECTION: NORMAL
REJECTED TEST: NORMAL
SODIUM SERPL-SCNC: 140 MMOL/L (ref 136–145)
SP GR UR STRIP.AUTO: >=1.03 (ref 1–1.03)
UA COMPLETE W REFLEX CULTURE PNL UR: ABNORMAL
UA DIPSTICK W REFLEX MICRO PNL UR: YES
URN SPEC COLLECT METH UR: ABNORMAL
UROBILINOGEN UR STRIP-ACNC: 1 E.U./DL
WBC # BLD AUTO: 13.7 K/UL (ref 4–11)
WBC #/AREA URNS AUTO: 5 /HPF (ref 0–5)

## 2024-12-11 PROCEDURE — 86901 BLOOD TYPING SEROLOGIC RH(D): CPT

## 2024-12-11 PROCEDURE — 86900 BLOOD TYPING SEROLOGIC ABO: CPT

## 2024-12-11 PROCEDURE — 6360000002 HC RX W HCPCS: Performed by: NURSE PRACTITIONER

## 2024-12-11 PROCEDURE — 96374 THER/PROPH/DIAG INJ IV PUSH: CPT

## 2024-12-11 PROCEDURE — 84703 CHORIONIC GONADOTROPIN ASSAY: CPT

## 2024-12-11 PROCEDURE — 85025 COMPLETE CBC W/AUTO DIFF WBC: CPT

## 2024-12-11 PROCEDURE — 83605 ASSAY OF LACTIC ACID: CPT

## 2024-12-11 PROCEDURE — 81001 URINALYSIS AUTO W/SCOPE: CPT

## 2024-12-11 PROCEDURE — 99285 EMERGENCY DEPT VISIT HI MDM: CPT

## 2024-12-11 PROCEDURE — 80053 COMPREHEN METABOLIC PANEL: CPT

## 2024-12-11 PROCEDURE — 96375 TX/PRO/DX INJ NEW DRUG ADDON: CPT

## 2024-12-11 PROCEDURE — 83690 ASSAY OF LIPASE: CPT

## 2024-12-11 PROCEDURE — 36415 COLL VENOUS BLD VENIPUNCTURE: CPT

## 2024-12-11 PROCEDURE — 86850 RBC ANTIBODY SCREEN: CPT

## 2024-12-11 RX ORDER — PANTOPRAZOLE SODIUM 40 MG/10ML
40 INJECTION, POWDER, LYOPHILIZED, FOR SOLUTION INTRAVENOUS ONCE
Status: COMPLETED | OUTPATIENT
Start: 2024-12-11 | End: 2024-12-11

## 2024-12-11 RX ORDER — ONDANSETRON 2 MG/ML
4 INJECTION INTRAMUSCULAR; INTRAVENOUS EVERY 30 MIN PRN
Status: DISCONTINUED | OUTPATIENT
Start: 2024-12-11 | End: 2024-12-12 | Stop reason: HOSPADM

## 2024-12-11 RX ORDER — IOPAMIDOL 755 MG/ML
75 INJECTION, SOLUTION INTRAVASCULAR
Status: COMPLETED | OUTPATIENT
Start: 2024-12-11 | End: 2024-12-12

## 2024-12-11 RX ADMIN — PANTOPRAZOLE SODIUM 40 MG: 40 INJECTION, POWDER, FOR SOLUTION INTRAVENOUS at 22:44

## 2024-12-11 RX ADMIN — ONDANSETRON 4 MG: 2 INJECTION, SOLUTION INTRAMUSCULAR; INTRAVENOUS at 22:44

## 2024-12-11 ASSESSMENT — LIFESTYLE VARIABLES
HOW MANY STANDARD DRINKS CONTAINING ALCOHOL DO YOU HAVE ON A TYPICAL DAY: 1 OR 2
HOW OFTEN DO YOU HAVE A DRINK CONTAINING ALCOHOL: MONTHLY OR LESS

## 2024-12-12 ENCOUNTER — APPOINTMENT (OUTPATIENT)
Dept: CT IMAGING | Age: 24
End: 2024-12-12
Payer: COMMERCIAL

## 2024-12-12 VITALS
BODY MASS INDEX: 47.09 KG/M2 | WEIGHT: 293 LBS | RESPIRATION RATE: 18 BRPM | TEMPERATURE: 98.2 F | HEART RATE: 98 BPM | DIASTOLIC BLOOD PRESSURE: 83 MMHG | OXYGEN SATURATION: 97 % | HEIGHT: 66 IN | SYSTOLIC BLOOD PRESSURE: 125 MMHG

## 2024-12-12 LAB
ABO + RH BLD: NORMAL
BLD GP AB SCN SERPL QL: NORMAL

## 2024-12-12 PROCEDURE — 6360000004 HC RX CONTRAST MEDICATION: Performed by: NURSE PRACTITIONER

## 2024-12-12 PROCEDURE — 74174 CTA ABD&PLVS W/CONTRAST: CPT

## 2024-12-12 RX ORDER — PANTOPRAZOLE SODIUM 20 MG/1
20 TABLET, DELAYED RELEASE ORAL
Qty: 14 TABLET | Refills: 0 | Status: SHIPPED | OUTPATIENT
Start: 2024-12-12 | End: 2024-12-26

## 2024-12-12 RX ADMIN — IOPAMIDOL 75 ML: 755 INJECTION, SOLUTION INTRAVENOUS at 00:04

## 2024-12-12 ASSESSMENT — ENCOUNTER SYMPTOMS
CHEST TIGHTNESS: 0
NAUSEA: 1
SHORTNESS OF BREATH: 0
VOMITING: 1
DIARRHEA: 0
ABDOMINAL PAIN: 1

## 2024-12-12 NOTE — DISCHARGE INSTRUCTIONS
It is mandatory that you follow up with your primary care provider and gastroenterology to ensure resolution/improvement of your symptoms.    Please see your discharge paperwork for information to contact Dr. Castro with gastroenterology.  Alternatively, please schedule an appointment with a specialists of this field with whom you have an existing relationship.    MANDATORY return to the emergency department within 12-24 hours unless you are better.  If you feel worse or have any other concerns, return sooner.    If you experience any of the red flag signs/symptoms that we discussed, please return to the emergency department or call 911 immediately.    Please take medications as we discussed.

## 2024-12-12 NOTE — ED PROVIDER NOTES
[id number]  [phone]    Schedule an appointment as soon as possible for a visit       Markell Castro MD  [address] [facility] [address]  [phone]          DISCHARGE MEDICATIONS  Discharge Medication List as of 12/12/2024 12:58 AM        START taking these medications    Details   pantoprazole (PROTONIX) 20 MG tablet Take 1 tablet by mouth every morning (before breakfast) for 14 days, Disp-14 tablet, R-0Normal           DISCONTINUED MEDICATIONS  Discharge Medication List as of 12/12/2024 12:58 AM            Note electronically signed by:   Fred Ortiz DO  Attending emergency physician    This chart was generated using the Dragon dictation system.  I created this record, but it may contain dictation errors given the limitations of this technology.       Fred Ortiz DO  12/12/24 0124    
mis-transcribed.)    NIKUNJ Sun CNP (electronically signed)           Meagan Spann APRN - CNP  12/12/24 0114       Meagan Spann APRN - CNP  12/12/24 0419

## 2025-01-25 ENCOUNTER — HOSPITAL ENCOUNTER (EMERGENCY)
Age: 25
Discharge: HOME | End: 2025-01-25
Attending: EMERGENCY MEDICINE
Payer: COMMERCIAL

## 2025-01-25 VITALS
WEIGHT: 293 LBS | TEMPERATURE: 97.1 F | RESPIRATION RATE: 19 BRPM | OXYGEN SATURATION: 93 % | DIASTOLIC BLOOD PRESSURE: 72 MMHG | HEIGHT: 66 IN | BODY MASS INDEX: 47.09 KG/M2 | SYSTOLIC BLOOD PRESSURE: 108 MMHG | HEART RATE: 82 BPM

## 2025-01-25 DIAGNOSIS — T39.391A NSAID OVERDOSE, ACCIDENTAL OR UNINTENTIONAL, INITIAL ENCOUNTER: Primary | ICD-10-CM

## 2025-01-25 LAB
ALBUMIN SERPL-MCNC: 3.8 G/DL (ref 3.4–5)
ALBUMIN/GLOB SERPL: 0.9 {RATIO} (ref 1.1–2.2)
ALP SERPL-CCNC: 74 U/L (ref 40–129)
ALT SERPL-CCNC: 10 U/L (ref 10–40)
AMPHETAMINES UR QL SCN>1000 NG/ML: POSITIVE
ANION GAP SERPL CALCULATED.3IONS-SCNC: 12 MMOL/L (ref 3–16)
APAP SERPL-MCNC: <5 UG/ML (ref 10–30)
AST SERPL-CCNC: 15 U/L (ref 15–37)
BACTERIA URNS QL MICRO: ABNORMAL /HPF
BARBITURATES UR QL SCN>200 NG/ML: ABNORMAL
BASE EXCESS BLDV CALC-SCNC: -0.1 MMOL/L (ref -3–3)
BASOPHILS # BLD: 0.1 K/UL (ref 0–0.2)
BASOPHILS NFR BLD: 0.5 %
BENZODIAZ UR QL SCN>200 NG/ML: ABNORMAL
BILIRUB SERPL-MCNC: 0.3 MG/DL (ref 0–1)
BILIRUB UR QL STRIP.AUTO: NEGATIVE
BUN SERPL-MCNC: 14 MG/DL (ref 7–20)
CALCIUM SERPL-MCNC: 8.6 MG/DL (ref 8.3–10.6)
CANNABINOIDS UR QL SCN>50 NG/ML: POSITIVE
CHLORIDE SERPL-SCNC: 105 MMOL/L (ref 99–110)
CLARITY UR: CLEAR
CO2 BLDV-SCNC: 64 MMOL/L
CO2 SERPL-SCNC: 24 MMOL/L (ref 21–32)
COCAINE UR QL SCN: ABNORMAL
COHGB MFR BLDV: 2.5 % (ref 0–1.5)
COLOR UR: ABNORMAL
CREAT SERPL-MCNC: 0.8 MG/DL (ref 0.6–1.1)
DEPRECATED RDW RBC AUTO: 16 % (ref 12.4–15.4)
DO-HGB MFR BLDV: 29 %
DRUG SCREEN COMMENT UR-IMP: ABNORMAL
EKG ATRIAL RATE: 87 BPM
EKG DIAGNOSIS: NORMAL
EKG P AXIS: 28 DEGREES
EKG P-R INTERVAL: 162 MS
EKG Q-T INTERVAL: 400 MS
EKG QRS DURATION: 106 MS
EKG QTC CALCULATION (BAZETT): 481 MS
EKG R AXIS: -2 DEGREES
EKG T AXIS: 8 DEGREES
EKG VENTRICULAR RATE: 87 BPM
EOSINOPHIL # BLD: 0.1 K/UL (ref 0–0.6)
EOSINOPHIL NFR BLD: 1.1 %
EPI CELLS #/AREA URNS AUTO: 9 /HPF (ref 0–5)
ETHANOLAMINE SERPL-MCNC: NORMAL MG/DL (ref 0–0.08)
FENTANYL SCREEN, URINE: ABNORMAL
GFR SERPLBLD CREATININE-BSD FMLA CKD-EPI: >90 ML/MIN/{1.73_M2}
GLUCOSE SERPL-MCNC: 132 MG/DL (ref 70–99)
GLUCOSE UR STRIP.AUTO-MCNC: NEGATIVE MG/DL
HCG UR QL: NEGATIVE
HCO3 BLDV-SCNC: 27 MMOL/L (ref 23–29)
HCT VFR BLD AUTO: 36.1 % (ref 36–48)
HGB BLD-MCNC: 11.5 G/DL (ref 12–16)
HGB UR QL STRIP.AUTO: ABNORMAL
HYALINE CASTS #/AREA URNS AUTO: 3 /LPF (ref 0–8)
KETONES UR STRIP.AUTO-MCNC: ABNORMAL MG/DL
LACTATE BLDV-SCNC: 0.7 MMOL/L (ref 0.4–2)
LACTATE BLDV-SCNC: 3.1 MMOL/L (ref 0.4–2)
LEUKOCYTE ESTERASE UR QL STRIP.AUTO: ABNORMAL
LYMPHOCYTES # BLD: 2.6 K/UL (ref 1–5.1)
LYMPHOCYTES NFR BLD: 21.2 %
MCH RBC QN AUTO: 26.2 PG (ref 26–34)
MCHC RBC AUTO-ENTMCNC: 31.8 G/DL (ref 31–36)
MCV RBC AUTO: 82.4 FL (ref 80–100)
METHADONE UR QL SCN>300 NG/ML: ABNORMAL
METHGB MFR BLDV: 0.8 %
MONOCYTES # BLD: 0.6 K/UL (ref 0–1.3)
MONOCYTES NFR BLD: 5.3 %
NEUTROPHILS # BLD: 8.8 K/UL (ref 1.7–7.7)
NEUTROPHILS NFR BLD: 71.9 %
NITRITE UR QL STRIP.AUTO: NEGATIVE
O2 CT VFR BLDV CALC: 11 VOL %
O2 THERAPY: ABNORMAL
OPIATES UR QL SCN>300 NG/ML: ABNORMAL
OXYCODONE UR QL SCN: ABNORMAL
PCO2 BLDV: 54.4 MMHG (ref 40–50)
PCP UR QL SCN>25 NG/ML: ABNORMAL
PH BLDV: 7.3 [PH] (ref 7.35–7.45)
PH UR STRIP.AUTO: 5.5 [PH] (ref 5–8)
PH UR STRIP: 5.5 [PH]
PLATELET # BLD AUTO: 381 K/UL (ref 135–450)
PMV BLD AUTO: 7.7 FL (ref 5–10.5)
PO2 BLDV: 42.1 MMHG (ref 25–40)
POTASSIUM SERPL-SCNC: 3.6 MMOL/L (ref 3.5–5.1)
PROT SERPL-MCNC: 8.1 G/DL (ref 6.4–8.2)
PROT UR STRIP.AUTO-MCNC: ABNORMAL MG/DL
RBC # BLD AUTO: 4.38 M/UL (ref 4–5.2)
RBC CLUMPS #/AREA URNS AUTO: 16 /HPF (ref 0–4)
SALICYLATES SERPL-MCNC: <0.5 MG/DL (ref 15–30)
SAO2 % BLDV: 70 %
SODIUM SERPL-SCNC: 141 MMOL/L (ref 136–145)
SP GR UR STRIP.AUTO: >=1.03 (ref 1–1.03)
UA COMPLETE W REFLEX CULTURE PNL UR: YES
UA DIPSTICK W REFLEX MICRO PNL UR: YES
URN SPEC COLLECT METH UR: ABNORMAL
UROBILINOGEN UR STRIP-ACNC: 1 E.U./DL
WBC # BLD AUTO: 12.2 K/UL (ref 4–11)
WBC #/AREA URNS AUTO: 23 /HPF (ref 0–5)

## 2025-01-25 PROCEDURE — 96374 THER/PROPH/DIAG INJ IV PUSH: CPT

## 2025-01-25 PROCEDURE — 96375 TX/PRO/DX INJ NEW DRUG ADDON: CPT

## 2025-01-25 PROCEDURE — 80053 COMPREHEN METABOLIC PANEL: CPT

## 2025-01-25 PROCEDURE — 84703 CHORIONIC GONADOTROPIN ASSAY: CPT

## 2025-01-25 PROCEDURE — 81001 URINALYSIS AUTO W/SCOPE: CPT

## 2025-01-25 PROCEDURE — 2580000003 HC RX 258: Performed by: EMERGENCY MEDICINE

## 2025-01-25 PROCEDURE — 85025 COMPLETE CBC W/AUTO DIFF WBC: CPT

## 2025-01-25 PROCEDURE — 82803 BLOOD GASES ANY COMBINATION: CPT

## 2025-01-25 PROCEDURE — 99284 EMERGENCY DEPT VISIT MOD MDM: CPT

## 2025-01-25 PROCEDURE — 87086 URINE CULTURE/COLONY COUNT: CPT

## 2025-01-25 PROCEDURE — 80179 DRUG ASSAY SALICYLATE: CPT

## 2025-01-25 PROCEDURE — 80307 DRUG TEST PRSMV CHEM ANLYZR: CPT

## 2025-01-25 PROCEDURE — 80143 DRUG ASSAY ACETAMINOPHEN: CPT

## 2025-01-25 PROCEDURE — 6360000002 HC RX W HCPCS: Performed by: EMERGENCY MEDICINE

## 2025-01-25 PROCEDURE — 83605 ASSAY OF LACTIC ACID: CPT

## 2025-01-25 PROCEDURE — 82077 ASSAY SPEC XCP UR&BREATH IA: CPT

## 2025-01-25 PROCEDURE — 36415 COLL VENOUS BLD VENIPUNCTURE: CPT

## 2025-01-25 PROCEDURE — 93010 ELECTROCARDIOGRAM REPORT: CPT | Performed by: INTERNAL MEDICINE

## 2025-01-25 PROCEDURE — 2500000003 HC RX 250 WO HCPCS: Performed by: EMERGENCY MEDICINE

## 2025-01-25 PROCEDURE — 93005 ELECTROCARDIOGRAM TRACING: CPT | Performed by: EMERGENCY MEDICINE

## 2025-01-25 RX ORDER — METOCLOPRAMIDE HYDROCHLORIDE 5 MG/ML
10 INJECTION INTRAMUSCULAR; INTRAVENOUS ONCE
Status: COMPLETED | OUTPATIENT
Start: 2025-01-25 | End: 2025-01-25

## 2025-01-25 RX ORDER — 0.9 % SODIUM CHLORIDE 0.9 %
1000 INTRAVENOUS SOLUTION INTRAVENOUS ONCE
Status: COMPLETED | OUTPATIENT
Start: 2025-01-25 | End: 2025-01-25

## 2025-01-25 RX ORDER — ONDANSETRON 4 MG/1
4 TABLET, ORALLY DISINTEGRATING ORAL 3 TIMES DAILY PRN
Qty: 21 TABLET | Refills: 0 | Status: SHIPPED | OUTPATIENT
Start: 2025-01-25

## 2025-01-25 RX ORDER — KETOROLAC TROMETHAMINE 15 MG/ML
15 INJECTION, SOLUTION INTRAMUSCULAR; INTRAVENOUS ONCE
Status: COMPLETED | OUTPATIENT
Start: 2025-01-25 | End: 2025-01-25

## 2025-01-25 RX ORDER — FAMOTIDINE 20 MG/1
20 TABLET, FILM COATED ORAL 2 TIMES DAILY
Qty: 60 TABLET | Refills: 3 | Status: SHIPPED | OUTPATIENT
Start: 2025-01-25

## 2025-01-25 RX ADMIN — SODIUM CHLORIDE 1000 ML: 9 INJECTION, SOLUTION INTRAVENOUS at 05:14

## 2025-01-25 RX ADMIN — SODIUM CHLORIDE 1000 ML: 9 INJECTION, SOLUTION INTRAVENOUS at 04:40

## 2025-01-25 RX ADMIN — KETOROLAC TROMETHAMINE 15 MG: 15 INJECTION, SOLUTION INTRAMUSCULAR; INTRAVENOUS at 04:41

## 2025-01-25 RX ADMIN — METOCLOPRAMIDE 10 MG: 5 INJECTION, SOLUTION INTRAMUSCULAR; INTRAVENOUS at 04:42

## 2025-01-25 RX ADMIN — SODIUM CHLORIDE, PRESERVATIVE FREE 20 MG: 5 INJECTION INTRAVENOUS at 04:41

## 2025-01-25 ASSESSMENT — LIFESTYLE VARIABLES
HOW OFTEN DO YOU HAVE A DRINK CONTAINING ALCOHOL: NEVER
HOW MANY STANDARD DRINKS CONTAINING ALCOHOL DO YOU HAVE ON A TYPICAL DAY: PATIENT DOES NOT DRINK

## 2025-01-25 ASSESSMENT — PAIN DESCRIPTION - LOCATION
LOCATION: ABDOMEN
LOCATION: ABDOMEN

## 2025-01-25 ASSESSMENT — PAIN - FUNCTIONAL ASSESSMENT: PAIN_FUNCTIONAL_ASSESSMENT: 0-10

## 2025-01-25 ASSESSMENT — PAIN DESCRIPTION - PAIN TYPE: TYPE: ACUTE PAIN

## 2025-01-25 ASSESSMENT — PAIN SCALES - GENERAL
PAINLEVEL_OUTOF10: 0
PAINLEVEL_OUTOF10: 8
PAINLEVEL_OUTOF10: 8

## 2025-01-25 NOTE — ED NOTES
Discharge instructions and prescriptions reviewed with pt. Pt allowed opportunity to ask questions and verbalized understanding.   Patient called her Mother who will be picking her up from the ED.

## 2025-01-25 NOTE — ED PROVIDER NOTES
EMERGENCY MEDICINE ATTENDING NOTE  Abel Colbert Jr., DO, FACEP, FAAEM        CHIEF COMPLAINT  Chief Complaint   Patient presents with    Abdominal Pain     Patient is prescribed diclofenac for foot pain and took 8 pills between noon and 8 pm.         HISTORY OF PRESENT ILLNESS  Mojgan Curiel is a 24 y.o. female who presents to the ED for accidental overdose of her Voltaren.  Patient states she takes it for her pain.  States that between noon and 8 PM she took 8 pills.  Was not on purpose.  She states that her chronic neuropathy was just causing her more severe pain.  Was awoken during the night with severe abdominal discomfort.  Denies any nausea or vomiting but does have some lightheadedness and feels a little more sleepy.  Took a total of 8 75 mg pills.    Nursing/triage notes reviewed.  No other complaints, modifying factors or associated symptoms.     REVIEW OF SYSTEMS:  All systems are reviewed and are negative unless noted in the HPI.    PAST MEDICAL HISTORY  Past Medical History:   Diagnosis Date    Depression     Obesity        SURGICAL HISTORY  No past surgical history on file.    FAMILY HISTORY  Family History   Problem Relation Age of Onset    Mental Illness Mother     Depression Mother     High Blood Pressure Mother     Miscarriages / Stillbirths Mother     Substance Abuse Mother     No Known Problems Sister     Alcohol Abuse Maternal Grandfather     Cancer Maternal Grandfather     Depression Maternal Grandmother     Alcohol Abuse Paternal Grandmother     Breast Cancer Paternal Aunt        SOCIAL HISTORY  Social History     Socioeconomic History    Marital status: Single     Spouse name: Not on file    Number of children: 0    Years of education: 12    Highest education level: Not on file   Occupational History    Occupation: udf   Tobacco Use    Smoking status: Never    Smokeless tobacco: Never   Vaping Use    Vaping status: Never Used   Substance and Sexual Activity    Alcohol use:

## 2025-01-26 LAB — BACTERIA UR CULT: NORMAL

## 2025-04-20 ENCOUNTER — HOSPITAL ENCOUNTER (EMERGENCY)
Age: 25
Discharge: HOME | End: 2025-04-20
Attending: EMERGENCY MEDICINE
Payer: COMMERCIAL

## 2025-04-20 VITALS
RESPIRATION RATE: 18 BRPM | WEIGHT: 293 LBS | SYSTOLIC BLOOD PRESSURE: 132 MMHG | OXYGEN SATURATION: 98 % | TEMPERATURE: 97.8 F | HEART RATE: 90 BPM | BODY MASS INDEX: 47.09 KG/M2 | HEIGHT: 66 IN | DIASTOLIC BLOOD PRESSURE: 94 MMHG

## 2025-04-20 DIAGNOSIS — F32.A DEPRESSION, UNSPECIFIED DEPRESSION TYPE: Primary | ICD-10-CM

## 2025-04-20 DIAGNOSIS — Z76.0 ENCOUNTER FOR MEDICATION REFILL: ICD-10-CM

## 2025-04-20 DIAGNOSIS — R45.851 PASSIVE SUICIDAL IDEATIONS: ICD-10-CM

## 2025-04-20 LAB
ALBUMIN SERPL-MCNC: 3.8 G/DL (ref 3.4–5)
ALBUMIN/GLOB SERPL: 1 {RATIO} (ref 1.1–2.2)
ALP SERPL-CCNC: 60 U/L (ref 40–129)
ALT SERPL-CCNC: 15 U/L (ref 10–40)
AMPHETAMINES UR QL SCN>1000 NG/ML: ABNORMAL
ANION GAP SERPL CALCULATED.3IONS-SCNC: 10 MMOL/L (ref 3–16)
APAP SERPL-MCNC: <5 UG/ML (ref 10–30)
AST SERPL-CCNC: 17 U/L (ref 15–37)
BACTERIA URNS QL MICRO: ABNORMAL /HPF
BARBITURATES UR QL SCN>200 NG/ML: ABNORMAL
BASOPHILS # BLD: 0.1 K/UL (ref 0–0.2)
BASOPHILS NFR BLD: 0.6 %
BENZODIAZ UR QL SCN>200 NG/ML: ABNORMAL
BILIRUB SERPL-MCNC: 0.3 MG/DL (ref 0–1)
BILIRUB UR QL STRIP.AUTO: NEGATIVE
BUN SERPL-MCNC: 10 MG/DL (ref 7–20)
CALCIUM SERPL-MCNC: 8.7 MG/DL (ref 8.3–10.6)
CANNABINOIDS UR QL SCN>50 NG/ML: POSITIVE
CHLORIDE SERPL-SCNC: 104 MMOL/L (ref 99–110)
CLARITY UR: CLEAR
CO2 SERPL-SCNC: 25 MMOL/L (ref 21–32)
COCAINE UR QL SCN: ABNORMAL
COLOR UR: YELLOW
CREAT SERPL-MCNC: 0.8 MG/DL (ref 0.6–1.1)
DEPRECATED RDW RBC AUTO: 15.7 % (ref 12.4–15.4)
DRUG SCREEN COMMENT UR-IMP: ABNORMAL
EOSINOPHIL # BLD: 0.2 K/UL (ref 0–0.6)
EOSINOPHIL NFR BLD: 1.6 %
EPI CELLS #/AREA URNS AUTO: 6 /HPF (ref 0–5)
ETHANOLAMINE SERPL-MCNC: NORMAL MG/DL (ref 0–0.08)
FENTANYL SCREEN, URINE: ABNORMAL
GFR SERPLBLD CREATININE-BSD FMLA CKD-EPI: >90 ML/MIN/{1.73_M2}
GLUCOSE SERPL-MCNC: 101 MG/DL (ref 70–99)
GLUCOSE UR STRIP.AUTO-MCNC: NEGATIVE MG/DL
HCT VFR BLD AUTO: 34.9 % (ref 36–48)
HGB BLD-MCNC: 11.3 G/DL (ref 12–16)
HGB UR QL STRIP.AUTO: NEGATIVE
HYALINE CASTS #/AREA URNS AUTO: 0 /LPF (ref 0–8)
KETONES UR STRIP.AUTO-MCNC: ABNORMAL MG/DL
LEUKOCYTE ESTERASE UR QL STRIP.AUTO: ABNORMAL
LYMPHOCYTES # BLD: 2.3 K/UL (ref 1–5.1)
LYMPHOCYTES NFR BLD: 17.7 %
MAGNESIUM SERPL-MCNC: 2.29 MG/DL (ref 1.8–2.4)
MCH RBC QN AUTO: 26.3 PG (ref 26–34)
MCHC RBC AUTO-ENTMCNC: 32.4 G/DL (ref 31–36)
MCV RBC AUTO: 81.1 FL (ref 80–100)
METHADONE UR QL SCN>300 NG/ML: ABNORMAL
MONOCYTES # BLD: 0.8 K/UL (ref 0–1.3)
MONOCYTES NFR BLD: 5.9 %
NEUTROPHILS # BLD: 9.5 K/UL (ref 1.7–7.7)
NEUTROPHILS NFR BLD: 74.2 %
NITRITE UR QL STRIP.AUTO: NEGATIVE
OPIATES UR QL SCN>300 NG/ML: ABNORMAL
OXYCODONE UR QL SCN: ABNORMAL
PCP UR QL SCN>25 NG/ML: ABNORMAL
PH UR STRIP.AUTO: 6 [PH] (ref 5–8)
PH UR STRIP: 6 [PH]
PLATELET # BLD AUTO: 429 K/UL (ref 135–450)
PMV BLD AUTO: 7.7 FL (ref 5–10.5)
POTASSIUM SERPL-SCNC: 4 MMOL/L (ref 3.5–5.1)
PROT SERPL-MCNC: 7.5 G/DL (ref 6.4–8.2)
PROT UR STRIP.AUTO-MCNC: NEGATIVE MG/DL
RBC # BLD AUTO: 4.3 M/UL (ref 4–5.2)
RBC CLUMPS #/AREA URNS AUTO: 0 /HPF (ref 0–4)
SALICYLATES SERPL-MCNC: <0.5 MG/DL (ref 15–30)
SARS-COV-2 RDRP RESP QL NAA+PROBE: NOT DETECTED
SODIUM SERPL-SCNC: 139 MMOL/L (ref 136–145)
SP GR UR STRIP.AUTO: 1.02 (ref 1–1.03)
UA COMPLETE W REFLEX CULTURE PNL UR: YES
UA DIPSTICK W REFLEX MICRO PNL UR: YES
URN SPEC COLLECT METH UR: ABNORMAL
UROBILINOGEN UR STRIP-ACNC: 1 E.U./DL
WBC # BLD AUTO: 12.7 K/UL (ref 4–11)
WBC #/AREA URNS AUTO: 12 /HPF (ref 0–5)

## 2025-04-20 PROCEDURE — 80179 DRUG ASSAY SALICYLATE: CPT

## 2025-04-20 PROCEDURE — 90792 PSYCH DIAG EVAL W/MED SRVCS: CPT | Performed by: NURSE PRACTITIONER

## 2025-04-20 PROCEDURE — 87086 URINE CULTURE/COLONY COUNT: CPT

## 2025-04-20 PROCEDURE — 82077 ASSAY SPEC XCP UR&BREATH IA: CPT

## 2025-04-20 PROCEDURE — 83735 ASSAY OF MAGNESIUM: CPT

## 2025-04-20 PROCEDURE — 99285 EMERGENCY DEPT VISIT HI MDM: CPT

## 2025-04-20 PROCEDURE — 80053 COMPREHEN METABOLIC PANEL: CPT

## 2025-04-20 PROCEDURE — 80307 DRUG TEST PRSMV CHEM ANLYZR: CPT

## 2025-04-20 PROCEDURE — 80143 DRUG ASSAY ACETAMINOPHEN: CPT

## 2025-04-20 PROCEDURE — 85025 COMPLETE CBC W/AUTO DIFF WBC: CPT

## 2025-04-20 PROCEDURE — 6370000000 HC RX 637 (ALT 250 FOR IP)

## 2025-04-20 PROCEDURE — 81001 URINALYSIS AUTO W/SCOPE: CPT

## 2025-04-20 PROCEDURE — 87635 SARS-COV-2 COVID-19 AMP PRB: CPT

## 2025-04-20 RX ORDER — PRAZOSIN HYDROCHLORIDE 1 MG/1
1 CAPSULE ORAL NIGHTLY
Qty: 30 CAPSULE | Refills: 0 | Status: SHIPPED | OUTPATIENT
Start: 2025-04-20

## 2025-04-20 RX ORDER — BUSPIRONE HYDROCHLORIDE 7.5 MG/1
7.5 TABLET ORAL 2 TIMES DAILY
Qty: 60 TABLET | Refills: 0 | Status: SHIPPED | OUTPATIENT
Start: 2025-04-20

## 2025-04-20 RX ORDER — BUPROPION HYDROCHLORIDE 150 MG/1
150 TABLET ORAL DAILY
Status: DISCONTINUED | OUTPATIENT
Start: 2025-04-20 | End: 2025-04-20 | Stop reason: HOSPADM

## 2025-04-20 RX ORDER — BUSPIRONE HYDROCHLORIDE 5 MG/1
10 TABLET ORAL ONCE
Status: COMPLETED | OUTPATIENT
Start: 2025-04-20 | End: 2025-04-20

## 2025-04-20 RX ORDER — CEPHALEXIN 500 MG/1
500 CAPSULE ORAL 2 TIMES DAILY
Qty: 14 CAPSULE | Refills: 0 | Status: SHIPPED | OUTPATIENT
Start: 2025-04-20 | End: 2025-04-27

## 2025-04-20 RX ORDER — BUPROPION HYDROCHLORIDE 150 MG/1
150 TABLET ORAL EVERY MORNING
Qty: 30 TABLET | Refills: 0 | Status: SHIPPED | OUTPATIENT
Start: 2025-04-20

## 2025-04-20 RX ORDER — ARIPIPRAZOLE 5 MG/1
2.5 TABLET ORAL NIGHTLY
Qty: 28 TABLET | Refills: 0 | Status: SHIPPED | OUTPATIENT
Start: 2025-04-20

## 2025-04-20 RX ADMIN — BUSPIRONE HYDROCHLORIDE 10 MG: 5 TABLET ORAL at 08:40

## 2025-04-20 ASSESSMENT — LIFESTYLE VARIABLES
HOW MANY STANDARD DRINKS CONTAINING ALCOHOL DO YOU HAVE ON A TYPICAL DAY: 5 OR 6
HOW OFTEN DO YOU HAVE A DRINK CONTAINING ALCOHOL: MONTHLY OR LESS

## 2025-04-20 NOTE — ED NOTES
Patient in safety gown in room, resting quietly. Constant observer at bedside. Patient belongings bagged awaiting security to secure. Unused room equipment removed from room.

## 2025-04-20 NOTE — ED PROVIDER NOTES
In addition to the advanced practice provider, I personally saw Mojgan Curiel and performed a substantive portion of the visit including all aspects of the medical decision making.    Medical Decision Making  Patient seen and evaluated at bedside.  Briefly, patient is presenting for psychiatric evaluation.  Lab workup showed a reactive leukocytosis of 12.3.  Anemia 11.3 at patient's baseline.  No clinically significant electrolyte abnormalities. No evidence of acute kidney injury.  Transaminases and total bilirubin within normal levels. Urine drug screen only positive for cannabinoids.    Patient was cleared from our medical standpoint was evaluated via telepsychiatry in the ED. Case discussed between SHAMAR and telepsychiatry after evaluation. Per recommendations, patient can be safely discharged as she has a plan in place outpatient and support system including patient's mother. In addition, will restart patient on psychiatric medication.  Patient understands and agrees to plan. I agree that the patient is low to no suicidal risk at this time given strong outpatient plan and support system. Patient is stable for discharge.      EKG  N/A    SEP-1  Is this patient to be included in the SEP-1 Core Measure due to severe sepsis or septic shock?   No     Exclusion criteria - the patient is NOT to be included for SEP-1 Core Measure due to:  2+ SIRS criteria are not met    Screenings     [place] Coma Scale  Eye Opening: Spontaneous  Best Verbal Response: Oriented  Best Motor Response: Obeys commands  [place] Coma Scale Score: 15             Patient Referrals:  [facility]  [phone]  Call in 1 day      Rey Medina MD  [address] [facility] [address] [phone]    Schedule an appointment as soon as possible for a visit   As needed    [facility] Emergency Department  [address] [facility] [address] [phone]  Go to   If symptoms worsen      Discharge

## 2025-04-20 NOTE — DISCHARGE INSTRUCTIONS
You are contracted for safety in the emergency department.  Plan is to restart your medications which were sent to your pharmacy.  You were given first doses in the emergency department.    The majority of your blood work and urine studies were reassuring.  There is a potential for an early urinary tract infection.  You were started on antibiotics.  Urine culture will result in the next 2 to 3 days.    Please call [facility] tomorrow to schedule an appointment with your psychiatrist as well as your psychologist.    If you have any acutely worsening or concerning symptoms including more frequent suicidal ideations or you fear that you may act upon suicidal ideations, have worsening depression or anxiety, please return to the emergency department for reevaluation.

## 2025-04-20 NOTE — VIRTUAL HEALTH
Mojgan MONZON Moisés, was evaluated through a synchronous (real-time) audio-video encounter. The patient (and/or guardian if applicable) is aware that this is a billable service, which includes applicable co-pays. This virtual visit was conducted with patient's (and/or legal guardian's) consent. Patient identification was verified, and a caregiver was present when appropriate.  The patient was located at Facility (Appt Department): [facility] EMERGENCY DEPARTMENT  [address] [facility] [address]  Loc: [phone]  The provider was located at Home (City/State): [facility]  Confirm you are appropriately licensed, registered, or certified to deliver care in the state where the patient is located as indicated above. If you are not or unsure, please re-schedule the visit: Yes, I confirm.   [facility] Consult to Tele-Psych  Consult performed by: Olimpia Horn APRN - CNP  Consult ordered by: Georgia Leal APRN - CNP  Reason for consult: suicidal/risk to self  Assessment/Recommendations: 5. Medication Recommendations:  Abilify 2.5 mg po at bedtime for 4 nights, followed by an increase in Abilify 5 mg po at bedtime. Recommend prazosin 1 mg po at bedtime, Wellbutrin  mg po daily. Buspar 5 mg po tid  Patient to follow up with outpatient [facility]  for psychiatric care and meds.           Total time spent on this encounter: Not billed by time    --NIKUNJ Delgado CNP on 4/20/2025 at 10:15 AM    An electronic signature was used to authenticate this note.  Mojgan Solizalvino  [id number]  2000     EMERGENCY DEPARTMENT TELEPSYCHIATRY EVALUATION    04/20/25    Chief Complaint:  “suicidal/risk to self”  HPI: Patient is a 24 y.o.  female who presents for thoughts of self harm over the last two weeks and has been out of medications.. Patient presented to the ED on 04/20/25 from home Patient  arrives via mom while they were door dashing as patient has

## 2025-04-20 NOTE — ED NOTES
Security secured patient belongings, including shoes/crocs, shirt, jeans, underwear, and battery. Patient remains quietly resting in bed, room remains secure with removed belongings and equipment still absent from room, constant observer remains at bedside. Patient expresses no needs at this time.

## 2025-04-20 NOTE — ED NOTES
Patient remains resting quietly in bed at this time. Removed equipment and belongings remain absent from the room. Constant observer remains at bedside. Patient expresses no needs at this time.

## 2025-04-20 NOTE — ED PROVIDER NOTES
[facility] EMERGENCY DEPARTMENT  EMERGENCY DEPARTMENT ENCOUNTER        Pt Name: Mojgan Curiel  MRN: [id number]  Birthdate 2000  Date of evaluation: 4/20/2025  Provider: NIKUNJ Pelletier - LESA  PCP: Rey Medina MD  Note Started: 8:24 AM EDT 4/20/25       I have seen and evaluated this patient with my supervising physician Lesa Caicedo DO.      CHIEF COMPLAINT       Chief Complaint   Patient presents with    Psychiatric Evaluation     Pt in from home, patient states she has been having a hard time, been out of meds, been going on for 2 weeks        HISTORY OF PRESENT ILLNESS: 1 or more Elements     History From: Patient, EMR review    Chief Complaint: Insomnia, depression    Mojgan Curiel is a 24 y.o. female with a past medical history notable for depression, anxiety, bipolar disorder, JAVID, cannabis use disorder, hyperlipidemia, mild intermittent asthma, remote history of intentional ingestion as a means of suicide attempt (approximately 1 year ago) who presents to the emergency department as a self-referral in private vehicle for evaluation of depression, anxiety, insomnia.  The patient is connected with psychiatry through [facility] however has not been seen in the past 2-3 months.  Because she has not been seen in the past several months she ran out of her BuSpar and Wellbutrin about 1 month ago.  Over the past 2 weeks has been having worsening depression, anxiety, insomnia.  States that she has not been eating or sleeping well.  Noted that her home hygiene is becoming infrequent.  She states \"I feel like I am about to break and I just need some time\".  Denies SI and HI upon arrival but states that she has had intermittent thoughts of suicidal ideations.  The methodology that she offers includes \"I wanted to drive my car off of a bridge\".  Denies visual or auditory hallucinations.  Denies access to firearms but reports that where she lives \"there is a lot of

## 2025-04-21 LAB — BACTERIA UR CULT: NORMAL

## 2025-05-01 ENCOUNTER — E-VISIT (OUTPATIENT)
Dept: PRIMARY CARE CLINIC | Age: 25
End: 2025-05-01

## 2025-05-01 DIAGNOSIS — J06.9 UPPER RESPIRATORY TRACT INFECTION, UNSPECIFIED TYPE: Primary | ICD-10-CM

## 2025-05-01 PROCEDURE — 99422 OL DIG E/M SVC 11-20 MIN: CPT | Performed by: NURSE PRACTITIONER

## 2025-05-01 ASSESSMENT — LIFESTYLE VARIABLES: SMOKING_STATUS: NO, I'VE NEVER SMOKED

## 2025-05-01 NOTE — PROGRESS NOTES
Mojgan Curiel (2000) initiated an asynchronous digital communication through [employer].    HPI: per patient questionnaire     Exam: not applicable    Diagnoses and all orders for this visit:  Diagnoses and all orders for this visit:    Upper respiratory tract infection, unspecified type      Patient was seen in urgency room yesterday with foot pain and leg swelling.  She was started on Keflex yesterday.  She is complaining that it is not helping.  I recommend that she continue to stay on the antibiotic since has been less than 24 hours.  Recommend supportive care measures.  Encourage follow-up with PCP    18 minutes were spent on the digital evaluation and management of this patient.    Gabrielle Colbert, APRN - CNP